# Patient Record
Sex: FEMALE | Race: WHITE | NOT HISPANIC OR LATINO | Employment: UNEMPLOYED | URBAN - NONMETROPOLITAN AREA
[De-identification: names, ages, dates, MRNs, and addresses within clinical notes are randomized per-mention and may not be internally consistent; named-entity substitution may affect disease eponyms.]

---

## 2017-01-06 ENCOUNTER — HOSPITAL ENCOUNTER (OUTPATIENT)
Dept: GENERAL RADIOLOGY | Facility: HOSPITAL | Age: 47
Discharge: HOME | End: 2017-01-06
Attending: INTERNAL MEDICINE

## 2017-04-06 ENCOUNTER — OFFICE VISIT (OUTPATIENT)
Dept: CARDIOLOGY | Facility: CLINIC | Age: 47
End: 2017-04-06

## 2017-04-06 VITALS
HEIGHT: 70 IN | HEART RATE: 77 BPM | WEIGHT: 131 LBS | BODY MASS INDEX: 18.75 KG/M2 | DIASTOLIC BLOOD PRESSURE: 68 MMHG | RESPIRATION RATE: 16 BRPM | SYSTOLIC BLOOD PRESSURE: 101 MMHG

## 2017-04-06 DIAGNOSIS — R53.82 CHRONIC FATIGUE: ICD-10-CM

## 2017-04-06 DIAGNOSIS — A41.9 SEVERE SEPSIS WITH ACUTE ORGAN DYSFUNCTION (HCC): ICD-10-CM

## 2017-04-06 DIAGNOSIS — R07.2 PRECORDIAL PAIN: ICD-10-CM

## 2017-04-06 DIAGNOSIS — R65.20 SEVERE SEPSIS WITH ACUTE ORGAN DYSFUNCTION (HCC): ICD-10-CM

## 2017-04-06 DIAGNOSIS — Z72.0 TOBACCO ABUSE: ICD-10-CM

## 2017-04-06 DIAGNOSIS — R06.02 SOB (SHORTNESS OF BREATH): Primary | ICD-10-CM

## 2017-04-06 PROBLEM — R06.09 DYSPNEA ON EXERTION: Status: ACTIVE | Noted: 2017-04-06

## 2017-04-06 PROCEDURE — 99204 OFFICE O/P NEW MOD 45 MIN: CPT | Performed by: INTERNAL MEDICINE

## 2017-04-06 PROCEDURE — 93000 ELECTROCARDIOGRAM COMPLETE: CPT | Performed by: INTERNAL MEDICINE

## 2017-04-06 RX ORDER — GABAPENTIN 800 MG/1
300 TABLET ORAL 2 TIMES DAILY
Status: ON HOLD | COMMUNITY
End: 2017-06-20 | Stop reason: DRUGHIGH

## 2017-04-06 NOTE — PROGRESS NOTES
Sigifredo Jacques MD  Ernesto Stone  2017    Patient Active Problem List   Diagnosis   • Recent severe sepsis with acute organ dysfunction   • Dyspnea on exertion   • Tobacco abuse, smoke one pack a day since she was 15 years old.    • Precordial pain   • Chronic fatigue       Dear Sigifredo Jacques MD:    Subjective     Ernesto Stone is a 47 y.o. female with the problems as listed above, presents today for a cardiac work up.     History of Present Illness: Ms. Stone is a 47-year-old  female with history of known coronary artery disease,  recent history of sepsis due to pneumonia for which she was admitted with [facility] and apparently left AGAINST MEDICAL ADVICE.  She now presents with complaints of weakness and shortness of breath.  She has intermittent chest pains or 3 times a week which are mostly sharp pains.  She has dyspnea with mild to moderate exertion with no PND or orthopnea.     Cardiac risk factors:smoking    Allergies   Allergen Reactions   • Benadryl [Diphenhydramine]    :      Current Outpatient Prescriptions:   •  albuterol (PROVENTIL HFA;VENTOLIN HFA) 108 (90 BASE) MCG/ACT inhaler, Inhale 2 puffs every 4 (four) hours as needed for wheezing., Disp: , Rfl:   •  albuterol (PROVENTIL) (2.5 MG/3ML) 0.083% nebulizer solution, Take 2.5 mg by nebulization Daily., Disp: , Rfl:   •  gabapentin (NEURONTIN) 800 MG tablet, Take 800 mg by mouth 2 (Two) Times a Day., Disp: , Rfl:   •  levothyroxine (SYNTHROID, LEVOTHROID) 100 MCG tablet, Take 100 mcg by mouth daily., Disp: , Rfl:   •  aspirin 81 MG tablet, Take 1 tablet by mouth Daily., Disp: 30 tablet, Rfl: 3    Past Medical History:   Diagnosis Date   • Alcoholic    • Asthma    • Disease of thyroid gland    • Ulcer of abdomen wall      Past Surgical History:   Procedure Laterality Date   •  SECTION     • FRACTURE SURGERY      x2   • KNEE CARTILAGE SURGERY Left      Family History   Problem Relation Age of Onset  "  • Cancer Mother    • Arthritis Mother    • Bradycardia Mother    • Other Mother      Tachycardia      Social History   Substance Use Topics   • Smoking status: Current Every Day Smoker     Packs/day: 1.00     Types: Cigarettes   • Smokeless tobacco: None   • Alcohol use Yes       Review of Systems   Constitution: Positive for decreased appetite, malaise/fatigue and weight loss.   HENT: Positive for congestion, headaches and sore throat. Negative for nosebleeds and tinnitus.    Eyes: Positive for double vision (on & off) and visual disturbance.   Cardiovascular: Positive for chest pain (with pressure) and dyspnea on exertion.   Respiratory: Positive for cough (Occ. pt states its from smoking ), shortness of breath and wheezing.    Endocrine: Positive for heat intolerance. Negative for cold intolerance.   Hematologic/Lymphatic: Does not bruise/bleed easily.   Skin: Negative for rash.   Musculoskeletal: Positive for arthritis (RA per pt), joint pain and muscle weakness.   Gastrointestinal: Positive for abdominal pain, change in bowel habit, constipation, diarrhea, nausea and vomiting. Negative for heartburn.   Genitourinary: Positive for bladder incontinence and hesitancy.   Neurological: Positive for excessive daytime sleepiness, dizziness, light-headedness, numbness (bilat hands) and tremors.   Psychiatric/Behavioral: Positive for depression, memory loss and substance abuse (states she is a recovering alcoholic). The patient has insomnia and is nervous/anxious.        Objective   Blood pressure 101/68, pulse 77, resp. rate 16, height 70\" (177.8 cm), weight 131 lb (59.4 kg).  Body mass index is 18.8 kg/(m^2).        Physical Exam   Constitutional: She is oriented to person, place, and time. She appears well-developed and well-nourished.   HENT:   Mouth/Throat: Oropharynx is clear and moist.   Eyes: EOM are normal. Pupils are equal, round, and reactive to light.   Neck: Neck supple. No JVD present. No tracheal " deviation present. No thyromegaly present.   Cardiovascular: Normal rate, regular rhythm, S1 normal and S2 normal.  Exam reveals no gallop and no friction rub.    No murmur heard.  Pulmonary/Chest: Effort normal and breath sounds normal.   Abdominal: Soft. Bowel sounds are normal. She exhibits no mass. There is no tenderness.   Musculoskeletal: Normal range of motion. She exhibits no edema.   Lymphadenopathy:     She has no cervical adenopathy.   Neurological: She is alert and oriented to person, place, and time.   Skin: Skin is warm and dry. No rash noted.       Lab Results   Component Value Date     09/29/2016    K 4.0 09/29/2016     09/29/2016    CO2 24.2 (L) 09/29/2016    BUN 22 (H) 09/29/2016    CREATININE 0.80 09/29/2016    GLUCOSE 197 (H) 09/29/2016    CALCIUM 8.4 09/29/2016    AST 28 09/29/2016    ALT 28 09/29/2016    ALKPHOS 41 (L) 09/29/2016    LABIL2 1.1 (L) 09/29/2016     Lab Results   Component Value Date    CKTOTAL 67 09/29/2016     Lab Results   Component Value Date    WBC 10.37 09/29/2016    HGB 11.9 (L) 09/29/2016    HCT 35.9 (L) 09/29/2016     09/29/2016     Lab Results   Component Value Date    INR 1.17 (H) 09/28/2016     Lab Results   Component Value Date    MG 1.6 (L) 09/28/2016     Lab Results   Component Value Date    TSH 0.466 (L) 09/27/2016      No results found for: BNP      ECG 12 Lead  Date/Time: 4/6/2017 1:57 PM  Performed by: JED OAKLEY  Authorized by: JED OAKLEY               Assessment/Plan :  Ernesto was seen today for establish care and chest pain.  Diagnoses and all orders for this visit:    1.  Precordial pain  2.  SOB (shortness of breath)  3.  Tobacco abuse, smoke one pack a day since she was 15 years old.   4.  Chronic fatigue  5.  Recent severe sepsis due to pneumonia.  6.  Medical noncompliance.       Recommendations:    1. Advised pt to stop smoking.   2. Evaluate further with Lexiscan stress test, Echo doppler study, and holter monitor.    3. Advised pt to keep herself hydrated with water and fluids such as Gatorade.  4. Add ASA 81 mg QD.   5. Follow up in 3 weeks.      Return in about 3 weeks (around 4/27/2017) for Recheck, or sooner if needed.    As always, I appreciate very much the opportunity to participate in the cardiovascular care of your patients.      With Best Regards,    Don Clifford MD, FACC

## 2017-04-28 ENCOUNTER — HOSPITAL ENCOUNTER (OUTPATIENT)
Dept: NUCLEAR MEDICINE | Facility: HOSPITAL | Age: 47
Discharge: HOME | End: 2017-04-28
Attending: INTERNAL MEDICINE

## 2017-04-28 ENCOUNTER — HOSPITAL ENCOUNTER (OUTPATIENT)
Dept: RESPIRATORY THERAPY | Facility: HOSPITAL | Age: 47
Discharge: HOME | End: 2017-04-28
Attending: INTERNAL MEDICINE

## 2017-04-28 ENCOUNTER — HOSPITAL ENCOUNTER (OUTPATIENT)
Dept: CARDIOLOGY | Facility: HOSPITAL | Age: 47
Discharge: HOME | End: 2017-04-28
Attending: INTERNAL MEDICINE

## 2017-04-28 DIAGNOSIS — R07.2 PRECORDIAL PAIN: ICD-10-CM

## 2017-04-28 DIAGNOSIS — R53.82 CHRONIC FATIGUE: ICD-10-CM

## 2017-04-28 PROCEDURE — A9500 TC99M SESTAMIBI: HCPCS | Performed by: INTERNAL MEDICINE

## 2017-04-28 PROCEDURE — 78451 HT MUSCLE IMAGE SPECT SING: CPT

## 2017-04-28 PROCEDURE — 78452 HT MUSCLE IMAGE SPECT MULT: CPT | Performed by: INTERNAL MEDICINE

## 2017-04-28 PROCEDURE — 93306 TTE W/DOPPLER COMPLETE: CPT | Performed by: INTERNAL MEDICINE

## 2017-04-28 PROCEDURE — 93306 TTE W/DOPPLER COMPLETE: CPT

## 2017-04-28 PROCEDURE — 93017 CV STRESS TEST TRACING ONLY: CPT

## 2017-04-28 PROCEDURE — 0 TECHNETIUM SESTAMIBI: Performed by: INTERNAL MEDICINE

## 2017-04-28 PROCEDURE — 93018 CV STRESS TEST I&R ONLY: CPT | Performed by: INTERNAL MEDICINE

## 2017-04-28 PROCEDURE — 25010000002 REGADENOSON 0.4 MG/5ML SOLUTION: Performed by: INTERNAL MEDICINE

## 2017-04-28 RX ADMIN — Medication 1 DOSE: at 08:05

## 2017-04-28 RX ADMIN — Medication 1 DOSE: at 10:52

## 2017-04-28 RX ADMIN — REGADENOSON 0.4 MG: 0.08 INJECTION, SOLUTION INTRAVENOUS at 10:55

## 2017-05-01 ENCOUNTER — OFFICE VISIT (OUTPATIENT)
Dept: CARDIOLOGY | Facility: CLINIC | Age: 47
End: 2017-05-01

## 2017-05-01 VITALS
HEART RATE: 57 BPM | BODY MASS INDEX: 18.64 KG/M2 | DIASTOLIC BLOOD PRESSURE: 68 MMHG | HEIGHT: 70 IN | SYSTOLIC BLOOD PRESSURE: 120 MMHG | WEIGHT: 130.2 LBS

## 2017-05-01 DIAGNOSIS — R53.82 CHRONIC FATIGUE: Primary | ICD-10-CM

## 2017-05-01 PROCEDURE — 99213 OFFICE O/P EST LOW 20 MIN: CPT | Performed by: INTERNAL MEDICINE

## 2017-05-02 ENCOUNTER — LAB (OUTPATIENT)
Dept: LAB | Facility: HOSPITAL | Age: 47
End: 2017-05-02
Attending: INTERNAL MEDICINE

## 2017-05-02 ENCOUNTER — HOSPITAL ENCOUNTER (OUTPATIENT)
Dept: RESPIRATORY THERAPY | Facility: HOSPITAL | Age: 47
Discharge: HOME | End: 2017-05-02
Attending: INTERNAL MEDICINE | Admitting: INTERNAL MEDICINE

## 2017-05-02 ENCOUNTER — TRANSCRIBE ORDERS (OUTPATIENT)
Dept: ADMINISTRATIVE | Facility: HOSPITAL | Age: 47
End: 2017-05-02

## 2017-05-02 DIAGNOSIS — R53.82 CHRONIC FATIGUE: ICD-10-CM

## 2017-05-02 DIAGNOSIS — R53.82 CHRONIC FATIGUE: Primary | ICD-10-CM

## 2017-05-02 LAB
ANION GAP SERPL CALCULATED.3IONS-SCNC: 2.4 MMOL/L (ref 3.6–11.2)
BH CV NUCLEAR PRIOR STUDY: 3
BH CV STRESS BP STAGE 1: NORMAL
BH CV STRESS BP STAGE 2: NORMAL
BH CV STRESS COMMENTS STAGE 1: NORMAL
BH CV STRESS COMMENTS STAGE 2: NORMAL
BH CV STRESS DOSE REGADENOSON STAGE 1: 0.4
BH CV STRESS DURATION MIN STAGE 1: 0
BH CV STRESS DURATION MIN STAGE 2: 4
BH CV STRESS DURATION SEC STAGE 1: 15
BH CV STRESS DURATION SEC STAGE 2: 0
BH CV STRESS HR STAGE 1: 48
BH CV STRESS HR STAGE 2: 53
BH CV STRESS PROTOCOL 1: NORMAL
BH CV STRESS RECOVERY BP: NORMAL MMHG
BH CV STRESS RECOVERY HR: 49 BPM
BH CV STRESS STAGE 1: 1
BH CV STRESS STAGE 2: 2
BUN BLD-MCNC: 8 MG/DL (ref 7–21)
BUN/CREAT SERPL: 8.9 (ref 7–25)
CALCIUM SPEC-SCNC: 9.5 MG/DL (ref 7.7–10)
CHLORIDE SERPL-SCNC: 109 MMOL/L (ref 99–112)
CO2 SERPL-SCNC: 32.6 MMOL/L (ref 24.3–31.9)
CREAT BLD-MCNC: 0.9 MG/DL (ref 0.43–1.29)
GFR SERPL CREATININE-BSD FRML MDRD: 67 ML/MIN/1.73
GLUCOSE BLD-MCNC: 100 MG/DL (ref 70–110)
LV EF NUC BP: 68 %
MAGNESIUM SERPL-MCNC: 2.1 MG/DL (ref 1.7–2.6)
MAXIMAL PREDICTED HEART RATE: 174 BPM
OSMOLALITY SERPL CALC.SUM OF ELEC: 285.3 MOSM/KG (ref 273–305)
PERCENT MAX PREDICTED HR: 44.25 %
POTASSIUM BLD-SCNC: 4.4 MMOL/L (ref 3.5–5.3)
SODIUM BLD-SCNC: 144 MMOL/L (ref 135–153)
STRESS BASELINE BP: NORMAL MMHG
STRESS BASELINE HR: 39 BPM
STRESS PERCENT HR: 52 %
STRESS POST PEAK BP: NORMAL MMHG
STRESS POST PEAK HR: 77 BPM
STRESS TARGET HR: 148 BPM
TSH SERPL DL<=0.05 MIU/L-ACNC: 2.11 MIU/ML (ref 0.55–4.78)

## 2017-05-02 PROCEDURE — 83735 ASSAY OF MAGNESIUM: CPT | Performed by: INTERNAL MEDICINE

## 2017-05-02 PROCEDURE — 36415 COLL VENOUS BLD VENIPUNCTURE: CPT

## 2017-05-02 PROCEDURE — 84443 ASSAY THYROID STIM HORMONE: CPT | Performed by: INTERNAL MEDICINE

## 2017-05-02 PROCEDURE — 93225 XTRNL ECG REC<48 HRS REC: CPT

## 2017-05-02 PROCEDURE — 80048 BASIC METABOLIC PNL TOTAL CA: CPT | Performed by: INTERNAL MEDICINE

## 2017-05-02 PROCEDURE — 93226 XTRNL ECG REC<48 HR SCAN A/R: CPT

## 2017-05-04 LAB
BH CV ECHO MEAS - % IVS THICK: 75 %
BH CV ECHO MEAS - % LVPW THICK: 29.7 %
BH CV ECHO MEAS - ACS: 2.2 CM
BH CV ECHO MEAS - AO ROOT AREA (BSA CORRECTED): 1.8
BH CV ECHO MEAS - AO ROOT AREA: 7.4 CM^2
BH CV ECHO MEAS - AO ROOT DIAM: 3.1 CM
BH CV ECHO MEAS - BSA(HAYCOCK): 1.7 M^2
BH CV ECHO MEAS - BSA: 1.7 M^2
BH CV ECHO MEAS - BZI_BMI: 18.8 KILOGRAMS/M^2
BH CV ECHO MEAS - BZI_METRIC_HEIGHT: 177.8 CM
BH CV ECHO MEAS - BZI_METRIC_WEIGHT: 59.4 KG
BH CV ECHO MEAS - CONTRAST EF 4CH: 64.9 ML/M^2
BH CV ECHO MEAS - EDV(CUBED): 119.7 ML
BH CV ECHO MEAS - EDV(MOD-SP4): 97 ML
BH CV ECHO MEAS - EDV(TEICH): 114.4 ML
BH CV ECHO MEAS - EF(CUBED): 71.1 %
BH CV ECHO MEAS - EF(MOD-SP4): 64.9 %
BH CV ECHO MEAS - EF(TEICH): 62.6 %
BH CV ECHO MEAS - ESV(CUBED): 34.6 ML
BH CV ECHO MEAS - ESV(MOD-SP4): 34 ML
BH CV ECHO MEAS - ESV(TEICH): 42.8 ML
BH CV ECHO MEAS - FS: 33.9 %
BH CV ECHO MEAS - IVS/LVPW: 0.83
BH CV ECHO MEAS - IVSD: 0.8 CM
BH CV ECHO MEAS - IVSS: 1.4 CM
BH CV ECHO MEAS - LA DIMENSION: 3.1 CM
BH CV ECHO MEAS - LA/AO: 1
BH CV ECHO MEAS - LV DIASTOLIC VOL/BSA (35-75): 55.6 ML/M^2
BH CV ECHO MEAS - LV MASS(C)D: 149.6 GRAMS
BH CV ECHO MEAS - LV MASS(C)DI: 85.8 GRAMS/M^2
BH CV ECHO MEAS - LV MASS(C)S: 142.6 GRAMS
BH CV ECHO MEAS - LV MASS(C)SI: 81.8 GRAMS/M^2
BH CV ECHO MEAS - LV SYSTOLIC VOL/BSA (12-30): 19.5 ML/M^2
BH CV ECHO MEAS - LVIDD: 4.9 CM
BH CV ECHO MEAS - LVIDS: 3.3 CM
BH CV ECHO MEAS - LVLD AP4: 8.8 CM
BH CV ECHO MEAS - LVLS AP4: 7.5 CM
BH CV ECHO MEAS - LVOT AREA (M): 2.3 CM^2
BH CV ECHO MEAS - LVOT AREA: 2.4 CM^2
BH CV ECHO MEAS - LVOT DIAM: 1.7 CM
BH CV ECHO MEAS - LVPWD: 0.96 CM
BH CV ECHO MEAS - LVPWS: 1.2 CM
BH CV ECHO MEAS - MV A MAX VEL: 37.5 CM/SEC
BH CV ECHO MEAS - MV E MAX VEL: 103.2 CM/SEC
BH CV ECHO MEAS - MV E/A: 2.8
BH CV ECHO MEAS - PA ACC SLOPE: 659.4 CM/SEC^2
BH CV ECHO MEAS - PA ACC TIME: 0.13 SEC
BH CV ECHO MEAS - PA PR(ACCEL): 18.8 MMHG
BH CV ECHO MEAS - RAP SYSTOLE: 10 MMHG
BH CV ECHO MEAS - RVDD: 2.6 CM
BH CV ECHO MEAS - RVSP: 36.5 MMHG
BH CV ECHO MEAS - SI(CUBED): 48.8 ML/M^2
BH CV ECHO MEAS - SI(MOD-SP4): 36.1 ML/M^2
BH CV ECHO MEAS - SI(TEICH): 41 ML/M^2
BH CV ECHO MEAS - SV(CUBED): 85.1 ML
BH CV ECHO MEAS - SV(MOD-SP4): 63 ML
BH CV ECHO MEAS - SV(TEICH): 71.5 ML
BH CV ECHO MEAS - TR MAX VEL: 257.4 CM/SEC

## 2017-05-10 PROCEDURE — 93227 XTRNL ECG REC<48 HR R&I: CPT | Performed by: INTERNAL MEDICINE

## 2017-05-22 ENCOUNTER — OFFICE VISIT (OUTPATIENT)
Dept: CARDIOLOGY | Facility: CLINIC | Age: 47
End: 2017-05-22

## 2017-05-22 VITALS
RESPIRATION RATE: 16 BRPM | BODY MASS INDEX: 18.66 KG/M2 | HEIGHT: 69 IN | DIASTOLIC BLOOD PRESSURE: 66 MMHG | WEIGHT: 126 LBS | HEART RATE: 76 BPM | SYSTOLIC BLOOD PRESSURE: 100 MMHG

## 2017-05-22 DIAGNOSIS — R07.2 PRECORDIAL PAIN: ICD-10-CM

## 2017-05-22 DIAGNOSIS — R53.83 OTHER FATIGUE: Primary | ICD-10-CM

## 2017-05-22 DIAGNOSIS — Z72.0 TOBACCO ABUSE: ICD-10-CM

## 2017-05-22 DIAGNOSIS — Z82.49 FAMILY HISTORY OF PREMATURE CORONARY ARTERY DISEASE: ICD-10-CM

## 2017-05-22 DIAGNOSIS — I95.9 HYPOTENSION, UNSPECIFIED HYPOTENSION TYPE: ICD-10-CM

## 2017-05-22 PROCEDURE — 99214 OFFICE O/P EST MOD 30 MIN: CPT | Performed by: PHYSICIAN ASSISTANT

## 2017-05-23 ENCOUNTER — LAB (OUTPATIENT)
Dept: LAB | Facility: HOSPITAL | Age: 47
End: 2017-05-23
Attending: INTERNAL MEDICINE

## 2017-05-23 DIAGNOSIS — R53.83 OTHER FATIGUE: ICD-10-CM

## 2017-05-23 DIAGNOSIS — R07.2 PRECORDIAL PAIN: ICD-10-CM

## 2017-05-23 DIAGNOSIS — I95.9 HYPOTENSION, UNSPECIFIED HYPOTENSION TYPE: ICD-10-CM

## 2017-05-23 LAB
ALBUMIN SERPL-MCNC: 4 G/DL (ref 3.5–5)
ALP SERPL-CCNC: 87 U/L (ref 35–104)
ALT SERPL W P-5'-P-CCNC: 30 U/L (ref 10–36)
AST SERPL-CCNC: 29 U/L (ref 10–30)
BILIRUB CONJ SERPL-MCNC: 0.1 MG/DL (ref 0–0.2)
BILIRUB INDIRECT SERPL-MCNC: 0.2 MG/DL
BILIRUB SERPL-MCNC: 0.3 MG/DL (ref 0.2–1.8)
CHOLEST SERPL-MCNC: 176 MG/DL (ref 0–200)
CORTIS AM PEAK SERPL-MCNC: 11.3 MCG/DL (ref 4.3–22.4)
HDLC SERPL-MCNC: 75 MG/DL (ref 60–100)
LDLC SERPL CALC-MCNC: 87 MG/DL (ref 0–100)
LDLC/HDLC SERPL: 1.16 {RATIO}
PROT SERPL-MCNC: 7.6 G/DL (ref 6–8)
TRIGL SERPL-MCNC: 71 MG/DL (ref 0–150)
VLDLC SERPL-MCNC: 14.2 MG/DL

## 2017-05-23 PROCEDURE — 82533 TOTAL CORTISOL: CPT | Performed by: PHYSICIAN ASSISTANT

## 2017-05-23 PROCEDURE — 36415 COLL VENOUS BLD VENIPUNCTURE: CPT

## 2017-05-23 PROCEDURE — 80076 HEPATIC FUNCTION PANEL: CPT | Performed by: PHYSICIAN ASSISTANT

## 2017-05-23 PROCEDURE — 80061 LIPID PANEL: CPT | Performed by: PHYSICIAN ASSISTANT

## 2017-06-01 DIAGNOSIS — R42 DIZZINESS: Primary | ICD-10-CM

## 2017-06-01 DIAGNOSIS — R94.39 ABNORMAL STRESS TEST: ICD-10-CM

## 2017-06-01 DIAGNOSIS — R07.2 PRECORDIAL PAIN: Primary | ICD-10-CM

## 2017-06-01 DIAGNOSIS — R00.1 BRADYCARDIA: ICD-10-CM

## 2017-06-09 ENCOUNTER — HOSPITAL ENCOUNTER (OUTPATIENT)
Dept: CT IMAGING | Facility: HOSPITAL | Age: 47
Discharge: HOME | End: 2017-06-09

## 2017-06-09 ENCOUNTER — DOCUMENTATION (OUTPATIENT)
Dept: CARDIOLOGY | Facility: CLINIC | Age: 47
End: 2017-06-09

## 2017-06-09 VITALS
OXYGEN SATURATION: 96 % | DIASTOLIC BLOOD PRESSURE: 71 MMHG | SYSTOLIC BLOOD PRESSURE: 117 MMHG | HEIGHT: 70 IN | BODY MASS INDEX: 18.04 KG/M2 | WEIGHT: 126 LBS | HEART RATE: 43 BPM

## 2017-06-09 DIAGNOSIS — R07.2 PRECORDIAL PAIN: ICD-10-CM

## 2017-06-09 RX ORDER — METOPROLOL TARTRATE 100 MG/1
100 TABLET ORAL ONCE AS NEEDED
Status: DISCONTINUED | OUTPATIENT
Start: 2017-06-09 | End: 2017-06-10 | Stop reason: HOSPADM

## 2017-06-09 RX ORDER — METOPROLOL TARTRATE 50 MG/1
50 TABLET, FILM COATED ORAL ONCE AS NEEDED
Status: DISCONTINUED | OUTPATIENT
Start: 2017-06-09 | End: 2017-06-10 | Stop reason: HOSPADM

## 2017-06-09 RX ORDER — NITROGLYCERIN 0.4 MG/1
0.4 TABLET SUBLINGUAL
Status: DISCONTINUED | OUTPATIENT
Start: 2017-06-09 | End: 2017-06-10 | Stop reason: HOSPADM

## 2017-06-09 RX ORDER — SODIUM CHLORIDE 0.9 % (FLUSH) 0.9 %
1-10 SYRINGE (ML) INJECTION AS NEEDED
Status: DISCONTINUED | OUTPATIENT
Start: 2017-06-09 | End: 2017-06-10 | Stop reason: HOSPADM

## 2017-06-09 RX ORDER — LIDOCAINE HYDROCHLORIDE 10 MG/ML
5 INJECTION, SOLUTION INFILTRATION; PERINEURAL AS NEEDED
Status: DISCONTINUED | OUTPATIENT
Start: 2017-06-09 | End: 2017-06-10 | Stop reason: HOSPADM

## 2017-06-09 NOTE — NURSING NOTE
Procedure was cancelled per radiologist due to heart rate too low and pt was stuck multiple times with no success. Pt did obtain and IV with Christina from infusion. IV site did not make it through patency testing with injector. Pt is to return to ordering office.

## 2017-06-12 NOTE — PROGRESS NOTES
Spoke with Ernesto and advised her what the hospital had told me about her CT that was going to be done this Friday morning. She stated that she had just spoke with someone from the hospital and stated that they told her that we hadn't put the order in correctly. I advised her that I had just got off the phone not long before she called and got it took care of and they was suppose to be calling her to ask a few more questions and was going to set it up for Friday. She expressed understanding.

## 2017-06-13 ENCOUNTER — OFFICE VISIT (OUTPATIENT)
Dept: CARDIOLOGY | Facility: CLINIC | Age: 47
End: 2017-06-13

## 2017-06-13 VITALS
BODY MASS INDEX: 18.64 KG/M2 | HEART RATE: 54 BPM | RESPIRATION RATE: 16 BRPM | SYSTOLIC BLOOD PRESSURE: 108 MMHG | WEIGHT: 130.2 LBS | HEIGHT: 70 IN | DIASTOLIC BLOOD PRESSURE: 62 MMHG

## 2017-06-13 DIAGNOSIS — R00.1 BRADYCARDIA: Primary | ICD-10-CM

## 2017-06-13 PROBLEM — B19.20 HCV (HEPATITIS C VIRUS): Status: ACTIVE | Noted: 2017-06-13

## 2017-06-13 PROCEDURE — 99212 OFFICE O/P EST SF 10 MIN: CPT | Performed by: INTERNAL MEDICINE

## 2017-06-13 NOTE — PROGRESS NOTES
"The patient was seen in clinic today and she appeared to be very agitated.  She stated \"she was told she could die and needed to see a cardiologist right now\".  I reviewed her record which shows she had a normal echocardiogram done as well as a normal stress test done.  She also had an event monitor placed which showed mostly sinus rhythm from the 60s to the 80s with occasional episodes of sinus bradycardia into the 50s.  He was noted to have no significant arrhythmias.  The patient was adamant that she needed to have a cardiac catheterization done as well and has a pacemaker placed.  She kept insisting she had gone to have a CT angiogram done and was told at that time that she had a heart rate into the 30s.  I could find no record of this.  She did have a previous Holter which showed bradycardia into the 40s and average heart rate into 70s and a maximum heart rate into the 130s. I tried to explain to her that she had a stress test done which showed adequate chronotropic response and at this point there was no indication for either a cardiac catheterization or a pacemaker placement.  At that point the patient became very agitated and decided to walk out of the clinic stating she was going to [place].  She stated she wanted all her records given and refuses to allow me to examine her.  She kept complaining that \"we did not understand how sick she was \".  At this point she is to call the clinic if she decides further follow-up.  "

## 2017-06-13 NOTE — PROGRESS NOTES
"Sigifredo Jacques MD  Ernesto MENDEZ French  1970 06/13/2017    Patient Active Problem List   Diagnosis   • Recent severe sepsis with acute organ dysfunction   • Dyspnea on exertion   • Tobacco abuse, smoke one pack a day since she was 15 years old.    • Precordial pain   • Chronic fatigue       Dear Sigifredo Jacques MD:    Subjective     Ernesto Stone is a 47 y.o. female with the above medical problems who {Specialty - why patient here?:[id number]}       Current Outpatient Prescriptions:   •  albuterol (PROVENTIL) (2.5 MG/3ML) 0.083% nebulizer solution, Take 2.5 mg by nebulization As Needed., Disp: , Rfl:   •  aspirin 81 MG tablet, Take 1 tablet by mouth Daily., Disp: 30 tablet, Rfl: 3  •  gabapentin (NEURONTIN) 800 MG tablet, Take 800 mg by mouth 2 (Two) Times a Day., Disp: , Rfl:   •  levothyroxine (SYNTHROID, LEVOTHROID) 100 MCG tablet, Take 100 mcg by mouth daily., Disp: , Rfl:   •  albuterol (PROVENTIL HFA;VENTOLIN HFA) 108 (90 BASE) MCG/ACT inhaler, Inhale 2 puffs every 4 (four) hours as needed for wheezing., Disp: , Rfl:     {Common H&P Review Areas:10099}    Review of Systems   Constitution: Negative for chills and fever.   HENT: Negative for headaches, nosebleeds and sore throat.    Cardiovascular: Positive for chest pain, dyspnea on exertion and palpitations.   Respiratory: Negative for cough, hemoptysis and wheezing.    Gastrointestinal: Negative for abdominal pain, hematemesis, hematochezia, melena, nausea and vomiting.   Genitourinary: Negative for dysuria and hematuria.   Neurological: Positive for dizziness.       Objective   Blood pressure 108/62, pulse 54, resp. rate 16, height 70\" (177.8 cm), weight 130 lb 3.2 oz (59.1 kg).    Physical Exam    Procedures    Assessment/Plan     No diagnosis found.         No Follow-up on file.    I appreciate the opportunity to participate in this patient's cardiovascular care.    Best Regards    Jarocho Graves      "

## 2017-06-15 ENCOUNTER — OFFICE VISIT (OUTPATIENT)
Dept: CARDIOLOGY | Facility: CLINIC | Age: 47
End: 2017-06-15

## 2017-06-15 VITALS
SYSTOLIC BLOOD PRESSURE: 119 MMHG | WEIGHT: 131.6 LBS | BODY MASS INDEX: 18.84 KG/M2 | HEIGHT: 70 IN | HEART RATE: 60 BPM | DIASTOLIC BLOOD PRESSURE: 75 MMHG

## 2017-06-15 DIAGNOSIS — R07.2 PRECORDIAL PAIN: Primary | ICD-10-CM

## 2017-06-15 DIAGNOSIS — R53.82 CHRONIC FATIGUE: ICD-10-CM

## 2017-06-15 DIAGNOSIS — Z72.0 TOBACCO ABUSE: ICD-10-CM

## 2017-06-15 DIAGNOSIS — R06.09 DYSPNEA ON EXERTION: ICD-10-CM

## 2017-06-15 PROCEDURE — 99214 OFFICE O/P EST MOD 30 MIN: CPT | Performed by: INTERNAL MEDICINE

## 2017-06-15 RX ORDER — ISOSORBIDE MONONITRATE 30 MG/1
30 TABLET, EXTENDED RELEASE ORAL DAILY
Qty: 30 TABLET | Refills: 2 | Status: ON HOLD | OUTPATIENT
Start: 2017-06-15 | End: 2017-06-20

## 2017-06-15 RX ORDER — DIAZEPAM 5 MG/1
5 TABLET ORAL ONCE
Status: CANCELLED | OUTPATIENT
Start: 2017-06-20

## 2017-06-15 NOTE — PROGRESS NOTES
Sigifredo Jacques MD  Ernesto Stone  1970  06/15/2017    Patient Active Problem List   Diagnosis   • Recent severe sepsis with acute organ dysfunction   • Dyspnea on exertion   • Tobacco abuse, smoke one pack a day since she was 15 years old.    • Precordial pain   • Chronic fatigue   • Bradycardia   • HCV (hepatitis C virus)   • Precordial pain, suspicious for CCS class III angina.       Dear Sigifredo Jacques MD:    Subjective     Ernesto Stone is a 47 y.o. female with the problems as listed above, presents for follow-up of chest pains and shortness of breath.      History of Present Illness:Ms. Stone is a 47-year-old  female with complaints of frequent chest pains and feeling tired all the time.  She also gets short of breath with mild exertion.  She has history of smoking since she was 15 years old.  One of her aunts had heart problems with bypass surgery in her 40s.Patient is very concerned that she may have significant coronary artery disease and wants to be evaluated further to rule out any significant coronary artery disease.      Allergies   Allergen Reactions   • Benadryl [Diphenhydramine]    :      Current Outpatient Prescriptions:   •  albuterol (PROVENTIL HFA;VENTOLIN HFA) 108 (90 BASE) MCG/ACT inhaler, Inhale 2 puffs every 4 (four) hours as needed for wheezing., Disp: , Rfl:   •  albuterol (PROVENTIL) (2.5 MG/3ML) 0.083% nebulizer solution, Take 2.5 mg by nebulization As Needed., Disp: , Rfl:   •  aspirin 81 MG tablet, Take 1 tablet by mouth Daily., Disp: 30 tablet, Rfl: 3  •  gabapentin (NEURONTIN) 800 MG tablet, Take 800 mg by mouth 2 (Two) Times a Day. Taking 1 tab nightly, Disp: , Rfl:   •  levothyroxine (SYNTHROID, LEVOTHROID) 100 MCG tablet, Take 100 mcg by mouth daily., Disp: , Rfl:   •  isosorbide mononitrate (IMDUR) 30 MG 24 hr tablet, Take 1 tablet by mouth Daily., Disp: 30 tablet, Rfl: 2      The following portions of the patient's history were reviewed and  "updated as appropriate: allergies, current medications, past family history, past medical history, past social history, past surgical history and problem list.    Social History   Substance Use Topics   • Smoking status: Current Every Day Smoker     Packs/day: 1.00     Types: Cigarettes   • Smokeless tobacco: Never Used   • Alcohol use Yes       Review of Systems   Constitution: Positive for weakness.   Cardiovascular: Positive for palpitations. Negative for syncope. Chest pain: pressure w/SOA.   Respiratory: Positive for shortness of breath.    Neurological: Positive for dizziness, light-headedness and numbness (both arms at night time).       Objective   Vitals:    06/15/17 1309   BP: 119/75   BP Location: Left arm   Pulse: 60   Weight: 131 lb 9.6 oz (59.7 kg)   Height: 70\" (177.8 cm)     Body mass index is 18.88 kg/(m^2).        Physical Exam   Constitutional: She is oriented to person, place, and time. She appears well-developed and well-nourished.   HENT:   Head: Normocephalic.   Eyes: Conjunctivae and EOM are normal.   Neck: Normal range of motion. Neck supple. No JVD present. No tracheal deviation present. No thyromegaly present.   Cardiovascular: Normal rate and regular rhythm.  Exam reveals no gallop and no friction rub.    No murmur heard.  Pulmonary/Chest: Breath sounds normal. No respiratory distress. She has no wheezes. She has no rales.   Abdominal: Soft. Bowel sounds are normal. She exhibits no mass. There is no tenderness.   Musculoskeletal: She exhibits no edema.   Neurological: She is alert and oriented to person, place, and time. No cranial nerve deficit.   Skin: Skin is warm and dry.   Psychiatric: She has a normal mood and affect.       Lab Results   Component Value Date     05/02/2017    K 4.4 05/02/2017     05/02/2017    CO2 32.6 (H) 05/02/2017    BUN 8 05/02/2017    CREATININE 0.90 05/02/2017    GLUCOSE 100 05/02/2017    CALCIUM 9.5 05/02/2017    AST 29 05/23/2017    ALT 30 " 05/23/2017    ALKPHOS 87 05/23/2017    LABIL2 1.1 (L) 09/29/2016     Lab Results   Component Value Date    CKTOTAL 67 09/29/2016     Lab Results   Component Value Date    WBC 10.37 09/29/2016    HGB 11.9 (L) 09/29/2016    HCT 35.9 (L) 09/29/2016     09/29/2016     Lab Results   Component Value Date    INR 1.17 (H) 09/28/2016     Lab Results   Component Value Date    MG 2.1 05/02/2017     Lab Results   Component Value Date    TSH 2.109 05/02/2017    TRIG 71 05/23/2017    HDL 75 05/23/2017      Procedures    Assessment/Plan    Diagnosis Plan   1. Precordial pain, suspicious for CCS class III angina.     2. Chronic fatigue     3. Tobacco abuse, smoke one pack a day since she was 15 years old.      4. Dyspnea on exertion(NYHA class III)            Recommendations:    1. Patient was initially set up for CT coronary angiography but this could not be done as patient apparently was bradycardic, the record of which I could not see on the Epic.  2. Since she has recurrent class III anginal symptoms and risk factors for coronary artery disease, I have discussed with her about the option of further cardiac evaluation with cardiac catheterization.  I have discussed the procedure, potential risks which include but are not limited to, stroke, heart attack, damage to the circulation, kidney damage, dye allergy and death etc.  Patient expressed understanding of the same and is wanting to proceed.  We'll try to schedule this for next Tuesday.  3. I have reviewed the event monitor recordings that are available to me and they have revealed sinus rhythm with heart rate in the 50s and 60s and 70s with occasional heart rate in the 40s in the early morning hours.  There was no evidence of any significant AV block or long pauses.  I have discussed these findings with the patient.  I told her that there is no absolute indication for a permanent pacemaker at this time.  4. Will continue with low-dose aspirin for now.  5. Imdur 30 mg  daily.  6. We'll order a VQ scan to rule out any evidence of pulmonary embolism and Pulmonary function tests to rule out any chronic lung disease causing her dyspnea.       Return in about 3 months (around 9/15/2017).    As always, I appreciate very much the opportunity to participate in the cardiovascular care of your patients.      With Best Regards,    Don Clifford MD, FACC

## 2017-06-19 ENCOUNTER — LAB (OUTPATIENT)
Dept: LAB | Facility: HOSPITAL | Age: 47
End: 2017-06-19
Attending: INTERNAL MEDICINE

## 2017-06-19 ENCOUNTER — HOSPITAL ENCOUNTER (OUTPATIENT)
Dept: RESPIRATORY THERAPY | Facility: HOSPITAL | Age: 47
Discharge: HOME | End: 2017-06-19
Attending: INTERNAL MEDICINE | Admitting: INTERNAL MEDICINE

## 2017-06-19 DIAGNOSIS — R07.2 PRECORDIAL PAIN: ICD-10-CM

## 2017-06-19 LAB
ALBUMIN SERPL-MCNC: 3.7 G/DL (ref 3.5–5)
ALBUMIN/GLOB SERPL: 1.1 G/DL (ref 1.5–2.5)
ALP SERPL-CCNC: 82 U/L (ref 35–104)
ALT SERPL W P-5'-P-CCNC: 34 U/L (ref 10–36)
ANION GAP SERPL CALCULATED.3IONS-SCNC: 0.2 MMOL/L (ref 3.6–11.2)
AST SERPL-CCNC: 31 U/L (ref 10–30)
BASOPHILS # BLD AUTO: 0.03 10*3/MM3 (ref 0–0.3)
BASOPHILS NFR BLD AUTO: 0.5 % (ref 0–2)
BILIRUB SERPL-MCNC: 0.5 MG/DL (ref 0.2–1.8)
BUN BLD-MCNC: 8 MG/DL (ref 7–21)
BUN/CREAT SERPL: 10.5 (ref 7–25)
CALCIUM SPEC-SCNC: 9 MG/DL (ref 7.7–10)
CHLORIDE SERPL-SCNC: 107 MMOL/L (ref 99–112)
CO2 SERPL-SCNC: 32.8 MMOL/L (ref 24.3–31.9)
CREAT BLD-MCNC: 0.76 MG/DL (ref 0.43–1.29)
DEPRECATED RDW RBC AUTO: 48.3 FL (ref 37–54)
EOSINOPHIL # BLD AUTO: 0.29 10*3/MM3 (ref 0–0.7)
EOSINOPHIL NFR BLD AUTO: 4.6 % (ref 0–5)
ERYTHROCYTE [DISTWIDTH] IN BLOOD BY AUTOMATED COUNT: 14.6 % (ref 11.5–14.5)
GFR SERPL CREATININE-BSD FRML MDRD: 82 ML/MIN/1.73
GLOBULIN UR ELPH-MCNC: 3.3 GM/DL
GLUCOSE BLD-MCNC: 111 MG/DL (ref 70–110)
HCT VFR BLD AUTO: 39.2 % (ref 37–47)
HGB BLD-MCNC: 12.5 G/DL (ref 12–16)
IMM GRANULOCYTES # BLD: 0.01 10*3/MM3 (ref 0–0.03)
IMM GRANULOCYTES NFR BLD: 0.2 % (ref 0–0.5)
LYMPHOCYTES # BLD AUTO: 1.51 10*3/MM3 (ref 1–3)
LYMPHOCYTES NFR BLD AUTO: 24.1 % (ref 21–51)
MCH RBC QN AUTO: 30.3 PG (ref 27–33)
MCHC RBC AUTO-ENTMCNC: 31.9 G/DL (ref 33–37)
MCV RBC AUTO: 94.9 FL (ref 80–94)
MONOCYTES # BLD AUTO: 0.57 10*3/MM3 (ref 0.1–0.9)
MONOCYTES NFR BLD AUTO: 9.1 % (ref 0–10)
NEUTROPHILS # BLD AUTO: 3.85 10*3/MM3 (ref 1.4–6.5)
NEUTROPHILS NFR BLD AUTO: 61.5 % (ref 30–70)
OSMOLALITY SERPL CALC.SUM OF ELEC: 278.4 MOSM/KG (ref 273–305)
PLATELET # BLD AUTO: 208 10*3/MM3 (ref 130–400)
PMV BLD AUTO: 9.7 FL (ref 6–10)
POTASSIUM BLD-SCNC: 4 MMOL/L (ref 3.5–5.3)
PROT SERPL-MCNC: 7 G/DL (ref 6–8)
RBC # BLD AUTO: 4.13 10*6/MM3 (ref 4.2–5.4)
SODIUM BLD-SCNC: 140 MMOL/L (ref 135–153)
WBC NRBC COR # BLD: 6.26 10*3/MM3 (ref 4.5–12.5)

## 2017-06-19 PROCEDURE — 80053 COMPREHEN METABOLIC PANEL: CPT | Performed by: INTERNAL MEDICINE

## 2017-06-19 PROCEDURE — 93005 ELECTROCARDIOGRAM TRACING: CPT | Performed by: INTERNAL MEDICINE

## 2017-06-19 PROCEDURE — 85025 COMPLETE CBC W/AUTO DIFF WBC: CPT | Performed by: INTERNAL MEDICINE

## 2017-06-19 PROCEDURE — 36415 COLL VENOUS BLD VENIPUNCTURE: CPT

## 2017-06-19 PROCEDURE — 93010 ELECTROCARDIOGRAM REPORT: CPT | Performed by: INTERNAL MEDICINE

## 2017-06-20 ENCOUNTER — HOSPITAL ENCOUNTER (OUTPATIENT)
Facility: HOSPITAL | Age: 47
Setting detail: HOSPITAL OUTPATIENT SURGERY
Discharge: HOME | End: 2017-06-20
Attending: INTERNAL MEDICINE | Admitting: INTERNAL MEDICINE

## 2017-06-20 VITALS
HEIGHT: 69 IN | TEMPERATURE: 98.6 F | WEIGHT: 131 LBS | RESPIRATION RATE: 16 BRPM | HEART RATE: 50 BPM | SYSTOLIC BLOOD PRESSURE: 110 MMHG | DIASTOLIC BLOOD PRESSURE: 77 MMHG | BODY MASS INDEX: 19.4 KG/M2 | OXYGEN SATURATION: 94 %

## 2017-06-20 DIAGNOSIS — R07.2 PRECORDIAL PAIN: ICD-10-CM

## 2017-06-20 LAB — B-HCG UR QL: NEGATIVE

## 2017-06-20 PROCEDURE — C1894 INTRO/SHEATH, NON-LASER: HCPCS | Performed by: INTERNAL MEDICINE

## 2017-06-20 PROCEDURE — 0 IOPAMIDOL PER 1 ML: Performed by: INTERNAL MEDICINE

## 2017-06-20 PROCEDURE — 93458 L HRT ARTERY/VENTRICLE ANGIO: CPT | Performed by: INTERNAL MEDICINE

## 2017-06-20 PROCEDURE — 25010000002 HEPARIN (PORCINE) PER 1000 UNITS: Performed by: INTERNAL MEDICINE

## 2017-06-20 PROCEDURE — C1769 GUIDE WIRE: HCPCS | Performed by: INTERNAL MEDICINE

## 2017-06-20 PROCEDURE — 81025 URINE PREGNANCY TEST: CPT | Performed by: INTERNAL MEDICINE

## 2017-06-20 RX ORDER — LIDOCAINE HYDROCHLORIDE 20 MG/ML
INJECTION, SOLUTION INFILTRATION; PERINEURAL AS NEEDED
Status: DISCONTINUED | OUTPATIENT
Start: 2017-06-20 | End: 2017-06-20 | Stop reason: HOSPADM

## 2017-06-20 RX ORDER — ACETAMINOPHEN 325 MG/1
500 TABLET ORAL ONCE
Status: COMPLETED | OUTPATIENT
Start: 2017-06-20 | End: 2017-06-20

## 2017-06-20 RX ORDER — SODIUM CHLORIDE 9 MG/ML
INJECTION, SOLUTION INTRAVENOUS CONTINUOUS PRN
Status: DISCONTINUED | OUTPATIENT
Start: 2017-06-20 | End: 2017-06-20 | Stop reason: HOSPADM

## 2017-06-20 RX ORDER — DIAZEPAM 5 MG/1
5 TABLET ORAL ONCE
Status: DISCONTINUED | OUTPATIENT
Start: 2017-06-20 | End: 2017-06-20 | Stop reason: HOSPADM

## 2017-06-20 RX ORDER — HEPARIN SODIUM 1000 [USP'U]/ML
INJECTION, SOLUTION INTRAVENOUS; SUBCUTANEOUS AS NEEDED
Status: DISCONTINUED | OUTPATIENT
Start: 2017-06-20 | End: 2017-06-20 | Stop reason: HOSPADM

## 2017-06-20 RX ORDER — CODEINE PHOSPHATE AND GUAIFENESIN 10; 100 MG/5ML; MG/5ML
SOLUTION ORAL AS NEEDED
Status: DISCONTINUED | OUTPATIENT
Start: 2017-06-20 | End: 2017-06-20 | Stop reason: HOSPADM

## 2017-06-20 RX ORDER — GABAPENTIN 300 MG/1
600 CAPSULE ORAL 3 TIMES DAILY
COMMUNITY
End: 2021-07-30

## 2017-06-20 RX ADMIN — ACETAMINOPHEN 487.5 MG: 325 TABLET ORAL at 13:55

## 2017-06-20 NOTE — DISCHARGE INSTR - ACTIVITY
No lifting, bending or stairs for 3 days. No bath, hot tub, or pool x 3 days. Today just rest and relax. Keep an eye on site for bleeding, if bleeding should occur, hold pressure and call 911 or drive to nearest hospital ER. Change dressing tomorrow to a band aid.

## 2017-06-21 ENCOUNTER — HOSPITAL ENCOUNTER (OUTPATIENT)
Dept: NUCLEAR MEDICINE | Facility: HOSPITAL | Age: 47
Discharge: HOME | End: 2017-06-21
Attending: INTERNAL MEDICINE

## 2017-06-21 DIAGNOSIS — R06.09 DYSPNEA ON EXERTION: ICD-10-CM

## 2017-06-21 PROCEDURE — 0 TECHNETIUM TC 99M PENTETATE KIT: Performed by: INTERNAL MEDICINE

## 2017-06-21 PROCEDURE — 0 TECHNETIUM ALBUMIN AGGREGATED: Performed by: INTERNAL MEDICINE

## 2017-06-21 PROCEDURE — A9540 TC99M MAA: HCPCS | Performed by: INTERNAL MEDICINE

## 2017-06-21 PROCEDURE — 78582 LUNG VENTILAT&PERFUS IMAGING: CPT

## 2017-06-21 PROCEDURE — A9539 TC99M PENTETATE: HCPCS | Performed by: INTERNAL MEDICINE

## 2017-06-21 PROCEDURE — 78582 LUNG VENTILAT&PERFUS IMAGING: CPT | Performed by: RADIOLOGY

## 2017-06-21 RX ADMIN — Medication 1 DOSE: at 08:39

## 2017-06-21 RX ADMIN — KIT FOR THE PREPARATION OF TECHNETIUM TC 99M PENTETATE 1 DOSE: 20 INJECTION, POWDER, LYOPHILIZED, FOR SOLUTION INTRAVENOUS; RESPIRATORY (INHALATION) at 08:29

## 2017-06-22 ENCOUNTER — TELEPHONE (OUTPATIENT)
Dept: CARDIOLOGY | Facility: CLINIC | Age: 47
End: 2017-06-22

## 2017-06-22 NOTE — TELEPHONE ENCOUNTER
Patient called stated that she is having left side pain and stomach swelling. I advised her to go to the ER.

## 2017-06-27 ENCOUNTER — APPOINTMENT (OUTPATIENT)
Dept: NUCLEAR MEDICINE | Facility: HOSPITAL | Age: 47
End: 2017-06-27
Attending: INTERNAL MEDICINE

## 2017-07-10 DIAGNOSIS — R55 SYNCOPE, UNSPECIFIED SYNCOPE TYPE: Primary | ICD-10-CM

## 2017-09-08 ENCOUNTER — CONSULT (OUTPATIENT)
Dept: CARDIOLOGY | Facility: CLINIC | Age: 47
End: 2017-09-08

## 2017-09-08 VITALS
HEART RATE: 75 BPM | DIASTOLIC BLOOD PRESSURE: 84 MMHG | SYSTOLIC BLOOD PRESSURE: 134 MMHG | BODY MASS INDEX: 19.85 KG/M2 | HEIGHT: 69 IN | WEIGHT: 134 LBS

## 2017-09-08 DIAGNOSIS — R06.83 SNORING: ICD-10-CM

## 2017-09-08 DIAGNOSIS — R00.1 BRADYCARDIA: Primary | ICD-10-CM

## 2017-09-08 DIAGNOSIS — I49.3 PVC'S (PREMATURE VENTRICULAR CONTRACTIONS): ICD-10-CM

## 2017-09-08 PROCEDURE — 99244 OFF/OP CNSLTJ NEW/EST MOD 40: CPT | Performed by: INTERNAL MEDICINE

## 2017-09-08 PROCEDURE — 93000 ELECTROCARDIOGRAM COMPLETE: CPT | Performed by: INTERNAL MEDICINE

## 2017-09-08 RX ORDER — FLECAINIDE ACETATE 50 MG/1
50 TABLET ORAL 2 TIMES DAILY
Qty: 180 TABLET | Refills: 3 | Status: SHIPPED | OUTPATIENT
Start: 2017-09-08 | End: 2020-01-15

## 2017-09-08 NOTE — PROGRESS NOTES
Ernesto Stone  1970  [phone]      09/08/2017    [facility] CARDIOLOGY    Sigifredo Jacques MD  [address]  [facility] [address]    REFERRING DOCTOR : Dr. Clifford         Patient ID: Ernesto Stone is a 47 y.o. female.    Chief Complaint:   Chief Complaint   Patient presents with   • Slow Heart Rate       Allergies   Allergen Reactions   • Antihistamines, Diphenhydramine-Type    • Benadryl [Diphenhydramine]        Current Outpatient Prescriptions:   •  albuterol (PROVENTIL HFA;VENTOLIN HFA) 108 (90 BASE) MCG/ACT inhaler, Inhale 2 puffs every 4 (four) hours as needed for wheezing., Disp: , Rfl:   •  albuterol (PROVENTIL) (2.5 MG/3ML) 0.083% nebulizer solution, Take 2.5 mg by nebulization As Needed., Disp: , Rfl:   •  aspirin 81 MG tablet, Take 1 tablet by mouth Daily., Disp: 30 tablet, Rfl: 3  •  gabapentin (NEURONTIN) 300 MG capsule, Take 300 mg by mouth 3 (Three) Times a Day., Disp: , Rfl:   •  levothyroxine (SYNTHROID, LEVOTHROID) 100 MCG tablet, Take 100 mcg by mouth daily., Disp: , Rfl:     History of Present Illness  Patient presents today for consultation referred by Dr. Clifford regarding bradycardia. She tells me today that she is constantly weak and soa. She said she was told that she had a heart rate in the 30s at times and that she would need a pacemaker. She was hospitalized last year for sepsis and said she has not been able to recover from this. Wore a 30 day event monitor that showed HRs occasionally in the 40s mainly while sleeping. She had a stress test that showed appropriate chronotropic response. Has also had LHC that showed no significant CAD. Normal LVEF. She also reports feeling occasional skipped beats that are worse when she lays on her left side. She continues to smoke. No issues with CP, pnd, edema, orthopnea, fevers, chills.  states she had double pneumonia and sepsis in 2016 is never felt right since then.  She also states  she never gets a good night sleep even though she goes to bed early.  Her  states she snores and wakes up gasping for air at times as well.  Overall her primary doctors check labs with no major abnormalities found and possibly even mentions chronic fatigue syndrome as a possibility.  He states she can feel skipped beats and one in the skipped beats are worse this when she actually feels bad as well.    The following portions of the patient's history were reviewed and updated as appropriate: allergies, current medications, past family history, past medical history, past social history, past surgical history and problem list.    Past Medical History:   Diagnosis Date   • Alcoholic    • Asthma    • Bradycardia    • CHF (congestive heart failure)    • Disease of thyroid gland    • Dyspnea    • Hx of sepsis 10/2016   • Hypertension    • Ulcer of abdomen wall          Past Surgical History:   Procedure Laterality Date   • CARDIAC CATHETERIZATION N/A 2017    Procedure: Left Heart Cath;  Surgeon: Don Clifford MD;  Location: [facility] INVASIVE LOCATION;  Service:    •  SECTION     • FRACTURE SURGERY Left     knee    • FRACTURE SURGERY Left    • KNEE CARTILAGE SURGERY Left        Social History     Social History   • Marital status: Legally      Spouse name: N/A   • Number of children: N/A   • Years of education: N/A     Occupational History   • Not on file.     Social History Main Topics   • Smoking status: Current Every Day Smoker     Packs/day: 1.00     Years: 30.00     Types: Cigarettes   • Smokeless tobacco: Never Used   • Alcohol use Yes   • Drug use: No   • Sexual activity: No     Other Topics Concern   • Not on file     Social History Narrative       Family History   Problem Relation Age of Onset   • Cancer Mother    • Arthritis Mother    • Bradycardia Mother    • Other Mother      Tachycardia          REVIEW OF SYSTEMS:   CONSTITUTIONAL: + weakness, + fatigue No weight loss, fever,  "chills   HEENT: Eyes: No visual loss, blurred vision, double vision or yellow sclerae. Ears, Nose, Throat: No hearing loss, sneezing, congestion, runny nose or sore throat.   SKIN: No rash or itching.     RESPIRATORY: + SOA, No hemoptysis, cough or sputum.   GASTROINTESTINAL: No anorexia, nausea, vomiting or diarrhea. No abdominal pain, bright red blood per rectum or melena.  GENITOURINARY: No burning on urination, hematuria or increased frequency.  NEUROLOGICAL: No headache, dizziness, syncope, paralysis, ataxia, numbness or tingling in the extremities. No change in bowel or bladder control.   MUSCULOSKELETAL: No muscle, back pain, joint pain or stiffness.   HEMATOLOGIC: No anemia, bleeding or bruising.   LYMPHATICS: No enlarged nodes. No history of splenectomy.   PSYCHIATRIC: No history of depression, anxiety, hallucinations.   ENDOCRINOLOGIC: No reports of sweating, cold or heat intolerance. No polyuria or polydipsia.   Ext: No edema or bruising      The patient's old records including ambulatory rhythm recordings (ECGs, Holter/event monitor) were reviewed and discussed.           Objective:       Vitals:    09/08/17 1134   BP: 134/84   BP Location: Left arm   Patient Position: Sitting   Pulse: 75   Weight: 134 lb (60.8 kg)   Height: 69\" (175.3 cm)       Constitutional: oriented to person, place, and time.  well-developed and well-nourished. No distress.   HENT: Normocephalic.   Eyes: Conjunctivae are normal. No scleral icterus.   Neck: Normal carotid pulses, no hepatojugular reflux and no JVD present. Carotid bruit is not present.   Cardiovascular: Normal rate, regular rhythm, S1 normal, S2 normal, normal heart sounds and intact distal pulses. Exam reveals no gallop, no distant heart sounds and no friction rub.    No murmur heard.  Pulses:       Radial pulses are 2+ on the right side, and 2+ on the left side.       Dorsalis pedis pulses are 2+ on the right side, and 2+ on the left side.   Pulmonary/Chest: Effort " normal and breath sounds normal. No respiratory distress. She has no decreased breath sounds.  no wheezes,  Rhonchi or rales.  Abdominal: Soft. Bowel sounds are normal. She exhibits no distension and no mass. There is no tenderness.   Musculoskeletal:  exhibits no edema, tenderness or deformity.   Neurological: is alert and oriented to person, place, and time.   Skin: Skin is warm and dry. No rash noted. No diaphoretic. No cyanosis or erythema. No pallor. Nails show no clubbing.   Psychiatric: Normal mood and affect.Speech is normal and behavior is normal.    Lab Review:     Holter monitor showed average heart rate of 70 bpm.  Low heart rates are between when she is sleeping.  Other than PACs and PVCs no other major issues.        ECG 12 Lead  Date/Time: 9/8/2017 12:24 PM  Performed by: KIM ASHBY  Authorized by: KIM ASHBY   Rhythm: sinus rhythm  Ectopy: PVCs  Comments: HR 70 bpm                Diagnosis:   1. Bradycardia  2. PVC's (premature ventricular contractions)  3. Snoring  4. Chronic Fatigue      Assessment & Plan:     1. Bradycardia w/ reports of HR in 30s. In that the patient heart rates are well mostly while she is sleeping.  Average heart rate of 70 bpm on the monitor.  Today in the office heart rate was 70 bpm as well.  Wore 30 day event that showed majority of rates in 60s-70s with occasionally drop to 40s. Patient has had stress test that showed appropriate response, normal echo, and LHC. She is constantly fatigued, weak, and SOA and I do not feel bradycardia is cause of symptoms and would therefore not benefit from PPM placement and it is not warranted at this time.     2. PVCs w/ complaints of skipped beats and even states that when she has these skipped beats and when there were she feels worse.  Therefore I think trying to suppress these extra heartbeats may be beneficial.  I think this is only one issue that is causing some of her fatigue.  Would start on flecainide 50 g twice a day  and get an EKG on Monday.    3.  Snoring and gasping for air in the middle the night per her .  Even though the patient's body habitus is not the most typical for sleep apnea at think she would benefit from a sleep study to make sure this is why she is not actually sleeping well.  Sleep study.    4.  Chronic fatigue.  Primary physician looking for other causes.  I am concerned that this may be a component chronic fatigue syndrome if no other issues are found as noted above.  I do not think that the PVCs are the only cause of her issues therefore using flecainide to try to suppress them is important but only one component of her symptoms therefore looking for other reasons are important.    5.  Follow-up in one month     Scribed for Gian Pierre DO by Kaelyn Calix PA-C. 9/8/2017  12:10 PM'      SUZANNE Hernandez  09/08/17  12:08 PM     I, Gian Pierre DO, personally performed the services described in this documentation as scribed by the above named individual in my presence, and it is both accurate and complete.  9/8/2017  12:23 PM    Gian Pierre DO  12:23 PM  09/08/17

## 2017-09-25 ENCOUNTER — TELEPHONE (OUTPATIENT)
Dept: CARDIOLOGY | Facility: CLINIC | Age: 47
End: 2017-09-25

## 2019-02-04 NOTE — PROGRESS NOTES
[facility] at [facility]  Consultation History and Physical  Ernesto Stone  1970  [unfilled]  [unfilled]  VISIT DATE:  02/07/19    PCP:   Sigifredo Jacques MD  [address]  [facility] [address]      CC:  Eval palpitations    Problem List:    Prior Cardiac testing:    Stress MPS 4/2017  · Myocardial perfusion imaging indicates a normal myocardial perfusion study with no evidence of ischemia.  · TID 0.97  · Left ventricular ejection fraction is normal (Calculated EF = 68%).  · Impressions are consistent with a low risk study.  · There is no prior study available for comparison.    ECHO 4/2017  Normal left ventricular cavity size and wall thickness noted. All left ventricular wall segments contract normally.  · The aortic valve is structurally normal. No aortic valve regurgitation is present. No aortic valve stenosis is present.  · The mitral valve is abnormal in structure. There are myxomatous changes of the mitral valve apparatus present. Mild mitral valve regurgitation is present. No significant mitral valve stenosis is present.  · The tricuspid valve is normal. No tricuspid valve stenosis is present. No tricuspid valve regurgitation is present. Estimated right ventricular systolic pressure from tricuspid regurgitation is mildly elevated (35-45 mmHg).  There is no evidence of pericardial effusion.    Cardiac Cath 6/2017-normal coronary arteries    Holter 5/2017  Relatively benign study with occasional PACs and PVCs    Most recent lipids total cholesterol 227, triglycerides 104, HDL 65,   Most recent thyroid labs show thyroid function within normal limits    History of Present Illness:  Ernesto Stone  Is a 49 y.o. female with pertinent cardiac history detailed above.  She is referred for evaluation of heart palpitations.  She has a history of hypothyroidism.  She's had some prior cardiac testing which is detailed above.  Most recent outside EKG was from  January 11, 2019 and showed sinus bradycardia with nonspecific ST changes in the lateral leads EKG similar today and appears unchanged 2017.  She is complaining of skipped beats and occasional palpitations worse at night.  She is seen  for this previously and was placed on flecainide 50 mg twice a day for PBC prevention however she was taken off of this about a month ago by her PCP.  She states actively that her palpitation symptoms seemed better over the past week without any other specific do change or intervention.  Denies chest pain or shortness of breath.  She does endorse poor sleep and snoring during her sleep and a prior plan sleep study had not been obtained.  Otherwise no complaints  Patient Active Problem List    Diagnosis Date Noted   • PVC's (premature ventricular contractions) 09/08/2017   • Precordial pain, suspicious for CCS class III angina. 06/15/2017   • Bradycardia 06/13/2017   • HCV (hepatitis C virus) 06/13/2017   • Dyspnea on exertion 04/06/2017   • Tobacco abuse, smoke one pack a day since she was 15 years old.  04/06/2017   • Precordial pain 04/06/2017   • Chronic fatigue 04/06/2017   • Recent severe sepsis with acute organ dysfunction 09/28/2016       Allergies   Allergen Reactions   • Antihistamines, Diphenhydramine-Type    • Benadryl [Diphenhydramine]        Social History     Socioeconomic History   • Marital status: Legally      Spouse name: Not on file   • Number of children: Not on file   • Years of education: Not on file   • Highest education level: Not on file   Social Needs   • Financial resource strain: Not on file   • Food insecurity - worry: Not on file   • Food insecurity - inability: Not on file   • Transportation needs - medical: Not on file   • Transportation needs - non-medical: Not on file   Occupational History   • Not on file   Tobacco Use   • Smoking status: Current Every Day Smoker     Packs/day: 1.00     Years: 30.00     Pack years: 30.00     Types:  "Cigarettes   • Smokeless tobacco: Never Used   Substance and Sexual Activity   • Alcohol use: Yes   • Drug use: No   • Sexual activity: No   Other Topics Concern   • Not on file   Social History Narrative   • Not on file       Family History   Problem Relation Age of Onset   • Cancer Mother    • Arthritis Mother    • Bradycardia Mother    • Other Mother         Tachycardia    • Arrhythmia Mother    • Cirrhosis Father        Current Medications:    Current Outpatient Medications:   •  albuterol (PROVENTIL HFA;VENTOLIN HFA) 108 (90 BASE) MCG/ACT inhaler, Inhale 2 puffs every 4 (four) hours as needed for wheezing., Disp: , Rfl:   •  aspirin 81 MG tablet, Take 1 tablet by mouth Daily., Disp: 30 tablet, Rfl: 3  •  ibuprofen (ADVIL,MOTRIN) 800 MG tablet, Take 800 mg by mouth Every 8 (Eight) Hours As Needed for Mild Pain ., Disp: , Rfl:   •  levothyroxine (SYNTHROID, LEVOTHROID) 100 MCG tablet, Take 100 mcg by mouth daily., Disp: , Rfl:   •  promethazine (PHENERGAN) 25 MG tablet, Take 25 mg by mouth Every 6 (Six) Hours As Needed for Nausea or Vomiting., Disp: , Rfl:   •  albuterol (PROVENTIL) (2.5 MG/3ML) 0.083% nebulizer solution, Take 2.5 mg by nebulization As Needed., Disp: , Rfl:   •  flecainide (TAMBOCOR) 50 MG tablet, Take 1 tablet by mouth 2 (Two) Times a Day., Disp: 180 tablet, Rfl: 3  •  gabapentin (NEURONTIN) 300 MG capsule, Take 300 mg by mouth 3 (Three) Times a Day., Disp: , Rfl:      Review of Systems   Constitution: Positive for malaise/fatigue.   Cardiovascular: Positive for irregular heartbeat and palpitations. Negative for chest pain, dyspnea on exertion and syncope.   All other systems reviewed and are negative.      Vitals:    02/07/19 1334 02/07/19 1335   SpO2:  97%   Weight: 74.9 kg (165 lb 3.2 oz)    Height: 176.5 cm (69.5\")        Physical Exam   Constitutional: She is oriented to person, place, and time. She appears well-developed and well-nourished.   HENT:   Head: Normocephalic and atraumatic. "   Eyes: EOM are normal.   Neck: Neck supple.   Cardiovascular: Normal rate, regular rhythm, normal heart sounds and intact distal pulses. Exam reveals no gallop and no friction rub.   No murmur heard.  Pulmonary/Chest: Effort normal and breath sounds normal.   Abdominal: Soft. Bowel sounds are normal.   Musculoskeletal: Normal range of motion. She exhibits no edema.   Neurological: She is alert and oriented to person, place, and time.   Skin: Skin is warm and dry.   Psychiatric: She has a normal mood and affect.       Diagnostic Data:    ECG 12 Lead  Date/Time: 2/7/2019 2:02 PM  Performed by: Javier Abebe MD  Authorized by: Javier Abebe MD   Rhythm: sinus rhythm  Rate: normal  BPM: 62  T depression: V3 and V4  QRS axis: normal  Clinical impression: abnormal ECG  Clinical impression comment: EKG does not appear significantly changed since 2017            Lab Results   Component Value Date    TRIG 71 05/23/2017    HDL 75 05/23/2017     Lab Results   Component Value Date    GLUCOSE 111 (H) 06/19/2017    BUN 8 06/19/2017    CREATININE 0.76 06/19/2017     06/19/2017    K 4.0 06/19/2017     06/19/2017    CO2 32.8 (H) 06/19/2017     No results found for: HGBA1C  Lab Results   Component Value Date    WBC 6.26 06/19/2017    HGB 12.5 06/19/2017    HCT 39.2 06/19/2017     06/19/2017       Assessment:   Diagnosis Plan   1. PENNY (obstructive sleep apnea)  Ambulatory Referral to Sleep Medicine   2. Palpitations  ECG 12 Lead    Holter Monitor - 72 Hour Up To 21 Days       Plan:      -ASCVD risk low 0.9%  -No recurrent chest pain and had prior normal coronary angiogram.  -T wave inversions in the precordial leads of been present since 2017 at least  -We'll refer to sleep medicine to obtain the previously planned sleep study  -Plan to remain off of flecainide for now and repeat 2 week Holter monitor to see if she still having PVCs or any other arrhythmias  -Has had prior echo showing she has a  structurally normal heart and normal EF  -Follow-up 1 month          Javier Abebe MD

## 2019-02-06 ENCOUNTER — TELEPHONE (OUTPATIENT)
Dept: CARDIOLOGY | Facility: CLINIC | Age: 49
End: 2019-02-06

## 2019-02-06 NOTE — TELEPHONE ENCOUNTER
Called patient to remind them of their appointment on 02/07/2019 @ 1300. Told patient to arrive 30 minutes prior to appointment and bring detailed med list or medication bottles with them.    Left voicemail

## 2019-02-07 ENCOUNTER — CONSULT (OUTPATIENT)
Dept: CARDIOLOGY | Facility: CLINIC | Age: 49
End: 2019-02-07

## 2019-02-07 VITALS — WEIGHT: 165.2 LBS | HEIGHT: 70 IN | OXYGEN SATURATION: 97 % | BODY MASS INDEX: 23.65 KG/M2

## 2019-02-07 DIAGNOSIS — R00.2 PALPITATIONS: ICD-10-CM

## 2019-02-07 DIAGNOSIS — G47.33 OSA (OBSTRUCTIVE SLEEP APNEA): Primary | ICD-10-CM

## 2019-02-07 PROCEDURE — 93000 ELECTROCARDIOGRAM COMPLETE: CPT | Performed by: INTERNAL MEDICINE

## 2019-02-07 PROCEDURE — 99244 OFF/OP CNSLTJ NEW/EST MOD 40: CPT | Performed by: INTERNAL MEDICINE

## 2019-02-07 RX ORDER — PROMETHAZINE HYDROCHLORIDE 25 MG/1
25 TABLET ORAL EVERY 6 HOURS PRN
COMMUNITY
End: 2020-08-14

## 2019-02-07 RX ORDER — IBUPROFEN 800 MG/1
800 TABLET ORAL EVERY 8 HOURS PRN
COMMUNITY
End: 2020-08-14

## 2019-03-06 ENCOUNTER — TELEPHONE (OUTPATIENT)
Dept: CARDIOLOGY | Facility: CLINIC | Age: 49
End: 2019-03-06

## 2019-03-06 NOTE — TELEPHONE ENCOUNTER
Called patient to let her know her monitor resutls per NSK:    Mrs. Stone had 2 short episodes of SVT similar to what she is been diagnosed within the past.  Overall the Holter monitor looked good    Patient verbalized understanding.

## 2019-03-06 NOTE — TELEPHONE ENCOUNTER
----- Message from Javier Abebe MD sent at 2/28/2019  1:09 PM EST -----  Mrs. Stone had 2 short episodes of SVT similar to what she is been diagnosed within the past.  Overall the Holter monitor looked good

## 2019-03-11 ENCOUNTER — TELEPHONE (OUTPATIENT)
Dept: CARDIOLOGY | Facility: CLINIC | Age: 49
End: 2019-03-11

## 2019-03-11 NOTE — TELEPHONE ENCOUNTER
Called patient to remind them of their appointment on 03/12/2019 @ 0930. Told patient to arrive 30 minutes prior to appointment and bring detailed med list or medication bottles with them.    Patient confirmed

## 2019-04-23 ENCOUNTER — TRANSCRIBE ORDERS (OUTPATIENT)
Dept: ADMINISTRATIVE | Facility: HOSPITAL | Age: 49
End: 2019-04-23

## 2019-04-23 DIAGNOSIS — R10.11 ABDOMINAL PAIN, RIGHT UPPER QUADRANT: Primary | ICD-10-CM

## 2019-05-01 ENCOUNTER — HOSPITAL ENCOUNTER (OUTPATIENT)
Dept: ULTRASOUND IMAGING | Facility: HOSPITAL | Age: 49
Discharge: HOME | End: 2019-05-01
Admitting: SURGERY

## 2019-05-01 DIAGNOSIS — R10.11 ABDOMINAL PAIN, RIGHT UPPER QUADRANT: ICD-10-CM

## 2019-05-01 PROCEDURE — 76705 ECHO EXAM OF ABDOMEN: CPT

## 2019-05-01 PROCEDURE — 76705 ECHO EXAM OF ABDOMEN: CPT | Performed by: RADIOLOGY

## 2019-05-06 ENCOUNTER — TRANSCRIBE ORDERS (OUTPATIENT)
Dept: ADMINISTRATIVE | Facility: HOSPITAL | Age: 49
End: 2019-05-06

## 2019-05-06 DIAGNOSIS — R10.11 ABDOMINAL PAIN, RIGHT UPPER QUADRANT: Primary | ICD-10-CM

## 2020-01-15 ENCOUNTER — OFFICE VISIT (OUTPATIENT)
Dept: NEUROSURGERY | Facility: CLINIC | Age: 50
End: 2020-01-15

## 2020-01-15 VITALS — WEIGHT: 146 LBS | TEMPERATURE: 97.6 F | BODY MASS INDEX: 20.9 KG/M2 | HEIGHT: 70 IN

## 2020-01-15 DIAGNOSIS — M54.9 MECHANICAL BACK PAIN: Primary | ICD-10-CM

## 2020-01-15 DIAGNOSIS — M51.36 DEGENERATIVE DISC DISEASE, LUMBAR: ICD-10-CM

## 2020-01-15 DIAGNOSIS — M47.816 FACET ARTHRITIS OF LUMBAR REGION: ICD-10-CM

## 2020-01-15 PROCEDURE — 99243 OFF/OP CNSLTJ NEW/EST LOW 30: CPT | Performed by: NEUROLOGICAL SURGERY

## 2020-01-15 RX ORDER — OXYCODONE HYDROCHLORIDE 15 MG/1
15 TABLET, FILM COATED, EXTENDED RELEASE ORAL EVERY 6 HOURS
Status: ON HOLD | COMMUNITY
End: 2021-08-02

## 2020-01-15 NOTE — PROGRESS NOTES
Patient: Ernesto Stone  : 1970    Primary Care Provider: Sigifredo Jacques MD    Requesting Provider: As above        History    Chief Complaint: Chronic low back pain.    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  This is worse at night when she is lying down.  She has a pressure-like feeling in her low back and tailbone region.  She has had pain running down her legs, left greater than right.  She has had some injections and the pain in her legs is not as prominent.  The pain extends into the left thigh and crosses over at the knee into the shin.  She has no bowel or bladder dysfunction.  She is worse with any sustained sitting, walking, stair climbing.  She continues to smoke heavily.  In the past she has done therapy and been treated with a TENS unit.    Review of Systems   Constitutional: Negative for activity change, appetite change, chills, diaphoresis, fatigue, fever and unexpected weight change.   HENT: Positive for dental problem, ear pain and facial swelling. Negative for congestion, drooling, ear discharge, hearing loss, mouth sores, nosebleeds, postnasal drip, rhinorrhea, sinus pressure, sneezing, sore throat, tinnitus, trouble swallowing and voice change.    Eyes: Negative for photophobia, pain, discharge, redness, itching and visual disturbance.   Respiratory: Negative for apnea, cough, choking, chest tightness, shortness of breath, wheezing and stridor.    Cardiovascular: Negative for chest pain, palpitations and leg swelling.   Gastrointestinal: Negative for abdominal distention, abdominal pain, anal bleeding, blood in stool, constipation, diarrhea, nausea, rectal pain and vomiting.   Endocrine: Negative for cold intolerance, heat intolerance, polydipsia, polyphagia and polyuria.   Genitourinary: Negative for decreased urine volume, difficulty urinating, dysuria, enuresis, flank  "pain, frequency, genital sores, hematuria and urgency.   Musculoskeletal: Negative for arthralgias, back pain, gait problem, joint swelling, myalgias, neck pain and neck stiffness.   Skin: Negative for color change, pallor, rash and wound.   Allergic/Immunologic: Negative for environmental allergies, food allergies and immunocompromised state.   Neurological: Negative for dizziness, tremors, seizures, syncope, facial asymmetry, speech difficulty, weakness, light-headedness, numbness and headaches.   Hematological: Negative for adenopathy. Does not bruise/bleed easily.   Psychiatric/Behavioral: Negative for agitation, behavioral problems, confusion, decreased concentration, dysphoric mood, hallucinations, self-injury, sleep disturbance and suicidal ideas. The patient is not nervous/anxious and is not hyperactive.        The patient's past medical history, past surgical history, family history, and social history have been reviewed at length in the electronic medical record.    Physical Exam:   Temp 97.6 °F (36.4 °C) (Temporal)   Ht 176.5 cm (69.5\")   Wt 66.2 kg (146 lb)   BMI 21.25 kg/m²   CONSTITUTIONAL: Patient is well-nourished, pleasant and appears stated age.  CV: Heart regular rate and rhythm without murmur, rub, or gallop.  PULMONARY: Lungs are clear to ascultation.  MUSCULOSKELETAL:  Straight leg raising is negative.  Malcolm's Sign is negative.  ROM in back normal.  Tenderness in the back to palpation is not observed.  NEUROLOGICAL:  Orientation, memory, attention span, language function, and cognition have been examined and are intact.  Strength is intact in the lower extremities to direct testing.  Muscle tone is normal throughout.  Station and gait are normal.  Sensation is intact to light touch testing throughout.  Deep tendon reflexes are 1+ and symmetrical.  Coordination is intact.      Medical Decision Making    Data Review:   MRI of the lumbar spine dated 2/1/2019 demonstrates diffuse degenerative " disc disease.  There is some chronic Modic endplate changes at L5-S1.  There is prominent central disc bulging at L4-5 that in conjunction with facet and ligamentous hypertrophy fashion is generous spinal stenosis.    Diagnosis:   1.  Mechanical low back pain.  2.  Lumbar stenosis without neurogenic claudication.  3.  Tobacco abuse.    Treatment Options:   Even though the patient has significant stenosis she really does not describe claudication symptoms.  I would probably consider facet blocks and if necessary rhizotomies.  If that is not helpful then a trial of a spinal cord stimulator would probably be a reasonable option.  I discussed the merits of smoking cessation as it relates to her back difficulties.       Diagnosis Plan   1. Mechanical back pain     2. Degenerative disc disease, lumbar     3. Facet arthritis of lumbar region         Scribed for John Bullock MD by Lisa Barker CMA on 01/15/2020 at 11:01 AM      I, Dr. Bullock, personally performed the services described in the documentation, as scribed in my presence, and it is both accurate and complete.

## 2020-06-19 ENCOUNTER — TELEPHONE (OUTPATIENT)
Dept: NEUROSURGERY | Facility: CLINIC | Age: 50
End: 2020-06-19

## 2020-06-19 NOTE — TELEPHONE ENCOUNTER
Provider: DR. SHEETS  Caller: AVERY CRUZ  Phone Number: [phone]  Reason for Call: HER PAIN MANAGEMENT DR WANTS HER TO SCHEDULE APPT.  When was the patient last seen: 1/15/20  Treatment Options:   Even though the patient has significant stenosis she really does not describe claudication symptoms.  I would probably consider facet blocks and if necessary rhizotomies.  If that is not helpful then a trial of a spinal cord stimulator would probably be a reasonable option.  I discussed the merits of smoking cessation as it relates to her back difficulties.    PT DID NOT DO THE FACET BLOCKS OR RHIZOTOMIES.    Where is it located: LOW BACK/ LEGS  Characteristics of symptom/severity: NUMBNESS/RADIATING PAIN  Timing- Is it constant or intermittent: CONSISTENT  What makes it worse: STANDING/WALKING/STEPS/LIFTING  What makes it better: LYING DOWN HELPS LEGS BUT MAKES BACK PAIN WORSE  What therapies/medications have you tried: PHYSICAL THERAPY AND THEY ARE LIMITED ON WHAT THEY CAN DO/OXYCODONE HELPS SOME/GABAPENTIN SEEMS TO HELP SOME

## 2020-08-14 ENCOUNTER — OFFICE VISIT (OUTPATIENT)
Dept: NEUROSURGERY | Facility: CLINIC | Age: 50
End: 2020-08-14

## 2020-08-14 VITALS
SYSTOLIC BLOOD PRESSURE: 106 MMHG | BODY MASS INDEX: 21.98 KG/M2 | TEMPERATURE: 98.2 F | WEIGHT: 148.4 LBS | HEART RATE: 48 BPM | HEIGHT: 69 IN | OXYGEN SATURATION: 96 % | DIASTOLIC BLOOD PRESSURE: 70 MMHG | RESPIRATION RATE: 16 BRPM

## 2020-08-14 DIAGNOSIS — M51.36 DEGENERATIVE DISC DISEASE, LUMBAR: ICD-10-CM

## 2020-08-14 DIAGNOSIS — M54.9 MECHANICAL BACK PAIN: Primary | ICD-10-CM

## 2020-08-14 DIAGNOSIS — M54.16 LUMBAR RADICULOPATHY: ICD-10-CM

## 2020-08-14 PROCEDURE — 99214 OFFICE O/P EST MOD 30 MIN: CPT | Performed by: PHYSICIAN ASSISTANT

## 2020-08-14 NOTE — PROGRESS NOTES
Patient: Ernesto Stone  : 1970  GENDER: female    Primary Care Provider: Sigifredo Jacques MD    Requesting Provider: As above      History    Chief Complaint: low back and left leg pain with walking/standing intolerances     History of Present Illness: Ms. Stone is a 50-year-old disabled female who presented to our service with low back and left lower extremity pain without true neurogenic claudication symptoms.  She was referred to us for consideration of facet blocks/rhizotomies as necessary.  Since last seen in the office, Dr. Fournier administered 3 epidural injections without lasting benefit.  She continues describes low back pain extending posteriorly into her glute, down her posterior thigh/calf-terminating in her heel.  Symptoms are constant and are aggravated by walking >30 minutes or going up/down stairs.  Symptoms do improve slightly in her leg with laying flat, however this does not help her back or hip discomfort.  More recently, her left leg feels tired with sustained activity, however she denies overt weakness.  She has episodic urinary urgency without alton incontinence.  She denies bowel dysfunction.  She continues her oxycodone 15 mg every 6 hours.  She has no other complaints at this time.    Review of Systems   Constitutional: Positive for activity change and fatigue.   HENT: Negative.    Eyes: Negative.    Respiratory: Negative.    Cardiovascular: Negative.    Gastrointestinal: Negative.    Endocrine: Positive for cold intolerance and heat intolerance.   Genitourinary: Negative.    Musculoskeletal: Positive for arthralgias, back pain, gait problem, joint swelling, myalgias, neck pain and neck stiffness.   Skin: Negative.    Allergic/Immunologic: Negative.    Neurological: Positive for dizziness, weakness and numbness.   Hematological: Positive for adenopathy.   Psychiatric/Behavioral: Negative.        The patient's review of systems, past medical history, past surgical  "history, family history, and social history have been reviewed at length in the electronic medical record.    Physical Exam:   /70   Pulse (!) 48   Temp 98.2 °F (36.8 °C) (Temporal)   Resp 16   Ht 176.5 cm (69.49\")   Wt 67.3 kg (148 lb 6.4 oz)   SpO2 96%   BMI 21.61 kg/m²   CONSTITUTIONAL:   - Patient is well-nourished  - Pleasant and appears stated age.  CV:   - Regular rate and rhythm on palpable radial pulse   - No murmur appreciated   PULMONARY:   - Speaking in full sentences  - No use of accessory muscles   - Breathing is non-labored   - No wheezing   MUSCULOSKELETAL:  - Low back tenderness to palpation is not observed.   - ROM in back is normal.  - Straight leg raise is negative   - Malcolm's sign is negative   NEUROLOGICAL:  - A&Ox3  - Attention span, language function, and cognition are intact.  - Strength is intact in the upper and lower extremities to direct testing.  - Muscle tone is normal throughout.  - Station and gait are normal.  - Sensation is intact to light touch testing throughout.  - Deep tendon reflexes are 2+ and symmetrical.    - Galvan's Sign is negative bilaterally.  - No clonus is elicited.  - Coordination is intact.    Body mass index is 21.61 kg/m².   Patient's Body mass index is 21.61 kg/m². BMI is within normal parameters. No follow-up required..         Medical Decision Making    Data Review:   1. MRI Lumbar Spine (2/01/2019): Demonstrated diffuse degenerative disc disease with chronic Modic endplate changes at L5-S1.  Additionally there is a central disc bulge at L4-5 with associated ligamentous hypertrophy without overt stenosis.    Diagnosis/Treatment Options:  1. Mechanical back pain  2. Degenerative disc disease, lumbar  3. Lumbar radiculopathy    - MRI Lumbar Spine Without Contrast       Follow up:  Ms. Stone is seen today in follow-up with progressive low back and left lower extremity pain with new walking/standing intolerances.  I referred her for a MRI of the " lumbar spine without gadolinium for additional review.  Patient will follow-up in the office thereafter for reassessment.  Treatment recommendations to follow pending findings.  Discussion at the beginning of the year included a spinal cord stimulator trial.    Hayley Robison PA-C   8/14/2020   10:45

## 2020-08-28 ENCOUNTER — OFFICE VISIT (OUTPATIENT)
Dept: NEUROSURGERY | Facility: CLINIC | Age: 50
End: 2020-08-28

## 2020-08-28 VITALS — WEIGHT: 142.6 LBS | BODY MASS INDEX: 21.12 KG/M2 | TEMPERATURE: 98.9 F | HEIGHT: 69 IN

## 2020-08-28 DIAGNOSIS — M51.36 DEGENERATIVE DISC DISEASE, LUMBAR: ICD-10-CM

## 2020-08-28 DIAGNOSIS — M54.16 LUMBAR RADICULOPATHY: ICD-10-CM

## 2020-08-28 DIAGNOSIS — M48.062 SPINAL STENOSIS, LUMBAR REGION, WITH NEUROGENIC CLAUDICATION: Primary | ICD-10-CM

## 2020-08-28 DIAGNOSIS — M47.816 FACET ARTHRITIS OF LUMBAR REGION: ICD-10-CM

## 2020-08-28 PROCEDURE — 99214 OFFICE O/P EST MOD 30 MIN: CPT | Performed by: PHYSICIAN ASSISTANT

## 2020-08-28 RX ORDER — FAMOTIDINE 10 MG
20 TABLET ORAL
Status: CANCELLED | OUTPATIENT
Start: 2020-08-28

## 2020-08-28 RX ORDER — SODIUM CHLORIDE 9 MG/ML
100 INJECTION, SOLUTION INTRAVENOUS CONTINUOUS
Status: CANCELLED | OUTPATIENT
Start: 2020-08-28

## 2020-08-28 NOTE — PROGRESS NOTES
"Patient: Ernesto Stone  : 1970  GENDER: female    Primary Care Provider: Sigifredo Jacques MD    Requesting Provider: As above      History    Chief Complaint: Low back and left leg pain with walking/standing intolerances    History of Present Illness: Ms. Stone is a 50-year-old disabled female who previously presented to our service with low back and left lower extremity pain without true neurogenic claudication symptoms.  She was referred to pain management for consideration of facet blocks/rhizotomies as necessary.  She underwent 3 epidural injections without lasting benefit with Dr. Fournier.  Since last seen in the office her low back complaints have progressed and now she complains mainly of left leg pain.  Her symptoms begin in her left glutes, continuing posteriorly down her thigh, wrapping anteriorly down her shin- terminating in the ankle.  Symptoms are constant and are aggravated by walking >30 minutes, or when transitioning up/down stairs.  Symptoms in her leg do improve with laying flat, however this does not help her back or hip discomfort.  She note a \"tiredness\" to her legs with sustained activity-however she denies overt weakness.  She admits to episodic urinary urgency without alton incontinence.  She denies bowel dysfunction.  She continues her oxycodone 15 mg every 6 hours.  She has no other complaints at this time.    Review of Systems   Constitutional: Positive for activity change and fatigue. Negative for appetite change, chills, diaphoresis, fever and unexpected weight change.   HENT: Negative.  Negative for congestion, dental problem, drooling, ear discharge, ear pain, facial swelling, hearing loss, mouth sores, nosebleeds, postnasal drip, rhinorrhea, sinus pressure, sneezing, sore throat, tinnitus, trouble swallowing and voice change.    Eyes: Negative.  Negative for photophobia, pain, discharge, redness, itching and visual disturbance.   Respiratory: Negative.  Negative " "for apnea, cough, choking, chest tightness, shortness of breath, wheezing and stridor.    Cardiovascular: Negative.  Negative for chest pain, palpitations and leg swelling.   Gastrointestinal: Negative.  Negative for abdominal distention, abdominal pain, anal bleeding, blood in stool, constipation, diarrhea, nausea, rectal pain and vomiting.   Endocrine: Positive for cold intolerance and heat intolerance. Negative for polydipsia, polyphagia and polyuria.   Genitourinary: Negative.  Negative for decreased urine volume, difficulty urinating, dysuria, enuresis, flank pain, frequency, genital sores, hematuria and urgency.   Musculoskeletal: Positive for arthralgias, back pain, gait problem, joint swelling, myalgias, neck pain and neck stiffness.   Skin: Negative.  Negative for color change, pallor, rash and wound.   Allergic/Immunologic: Negative.  Negative for environmental allergies, food allergies and immunocompromised state.   Neurological: Positive for dizziness, weakness and numbness. Negative for tremors, seizures, syncope, facial asymmetry, speech difficulty, light-headedness and headaches.   Hematological: Positive for adenopathy. Does not bruise/bleed easily.   Psychiatric/Behavioral: Negative.  Negative for agitation, behavioral problems, confusion, decreased concentration, dysphoric mood, hallucinations, self-injury, sleep disturbance and suicidal ideas. The patient is not nervous/anxious and is not hyperactive.        The patient's review of systems, past medical history, past surgical history, family history, and social history have been reviewed at length in the electronic medical record.    Physical Exam:   Temp 98.9 °F (37.2 °C) (Infrared)   Ht 176.3 cm (69.4\")   Wt 64.7 kg (142 lb 9.6 oz)   BMI 20.82 kg/m²   CONSTITUTIONAL:   - Patient is well-nourished  - Pleasant and appears stated age.  CV:   - Regular rate and rhythm on palpable radial pulse   - No murmur appreciated   PULMONARY:   - Speaking in " full sentences  - No use of accessory muscles   - Breathing is non-labored   - No wheezing   MUSCULOSKELETAL:  - Low back tenderness to palpation is not observed.   - ROM in back is normal.  - Straight leg raise is negative   - Malcolm's sign is negative   NEUROLOGICAL:  - A&Ox3  - Attention span, language function, and cognition are intact.  - Strength is intact in the upper and lower extremities to direct testing.  - Muscle tone is normal throughout.  - Station and gait are normal.  - Sensation is intact to light touch testing throughout.  - Deep tendon reflexes are 2+ and symmetrical.    - Galvan's Sign is negative bilaterally.  - No clonus is elicited.  - Coordination is intact.    Body mass index is 20.82 kg/m².   Patient's Body mass index is 20.82 kg/m². BMI is within normal parameters. No follow-up required..         Medical Decision Making    Data Review:   1. MRI Lumbar Spine (8/20/2020): Demonstrates diffuse degenerative disc disease with a prominent central disc protrusion at L4-5 with generous spinal stenosis.    Diagnosis/Treatment Options:  1. Spinal stenosis, lumbar region, with neurogenic claudication  2. Degenerative disc disease, lumbar  3. Lumbar radiculopathy  4. Facet arthritis of lumbar region    - Case Request; Standing  - famotidine (PEPCID) tablet 20 mg  - CBC and Differential; Future  - Basic metabolic panel; Future  - MRSA Screen Culture (Outpatient) - Swab, Nares; Future  - sodium chloride 0.9 % infusion  - ceFAZolin (ANCEF) 2 g in sodium chloride 0.9 % 100 mL IVPB  - Case Request         Follow up:  Ms. Stone is seen today in follow-up with a new MRI of the lumbar spine that demonstrates generous spinal stenosis at L4-5 that correlates with clinical symptoms.  After discussing with Dr. Bullock we have recommended proceeding with an L4-5 laminectomy with possible discectomy.  I explained the procedure in detail including the risks, complications, and limitations.  Patient has verbalized  understanding and has agreed to proceed with surgery.  Although surgery may greatly improve her left leg complaints -it may not eradicate all.      Additionally, Ms. Stone currently smokes 1/2 ppd -which is an improvement from 1-1.5 ppd.  Again, I counseled on the importance of smoking cessation for optimal surgical outcome.  Patient will work on quitting prior to surgery.    Hayley Robison PA-C   8/28/2020   15:01

## 2020-08-28 NOTE — H&P
"Patient: Ernesto Stone  : 1970  GENDER: female    Primary Care Provider: Sigifredo Jacques MD    Requesting Provider: As above      History    Chief Complaint: Low back and left leg pain with walking/standing intolerances    History of Present Illness: Ms. Stone is a 50-year-old disabled female who previously presented to our service with low back and left lower extremity pain without true neurogenic claudication symptoms.  She was referred to pain management for consideration of facet blocks/rhizotomies as necessary.  She underwent 3 epidural injections without lasting benefit with Dr. Fournier.  Since last seen in the office her low back complaints have progressed and now she complains mainly of left leg pain.  Her symptoms begin in her left glutes, continuing posteriorly down her thigh, wrapping anteriorly down her shin- terminating in the ankle.  Symptoms are constant and are aggravated by walking >30 minutes, or when transitioning up/down stairs.  Symptoms in her leg do improve with laying flat, however this does not help her back or hip discomfort.  She note a \"tiredness\" to her legs with sustained activity-however she denies overt weakness.  She admits to episodic urinary urgency without alton incontinence.  She denies bowel dysfunction.  She continues her oxycodone 15 mg every 6 hours.  She has no other complaints at this time.    Review of Systems   Constitutional: Positive for activity change and fatigue. Negative for appetite change, chills, diaphoresis, fever and unexpected weight change.   HENT: Negative.  Negative for congestion, dental problem, drooling, ear discharge, ear pain, facial swelling, hearing loss, mouth sores, nosebleeds, postnasal drip, rhinorrhea, sinus pressure, sneezing, sore throat, tinnitus, trouble swallowing and voice change.    Eyes: Negative.  Negative for photophobia, pain, discharge, redness, itching and visual disturbance.   Respiratory: Negative.  Negative " for apnea, cough, choking, chest tightness, shortness of breath, wheezing and stridor.    Cardiovascular: Negative.  Negative for chest pain, palpitations and leg swelling.   Gastrointestinal: Negative.  Negative for abdominal distention, abdominal pain, anal bleeding, blood in stool, constipation, diarrhea, nausea, rectal pain and vomiting.   Endocrine: Positive for cold intolerance and heat intolerance. Negative for polydipsia, polyphagia and polyuria.   Genitourinary: Negative.  Negative for decreased urine volume, difficulty urinating, dysuria, enuresis, flank pain, frequency, genital sores, hematuria and urgency.   Musculoskeletal: Positive for arthralgias, back pain, gait problem, joint swelling, myalgias, neck pain and neck stiffness.   Skin: Negative.  Negative for color change, pallor, rash and wound.   Allergic/Immunologic: Negative.  Negative for environmental allergies, food allergies and immunocompromised state.   Neurological: Positive for dizziness, weakness and numbness. Negative for tremors, seizures, syncope, facial asymmetry, speech difficulty, light-headedness and headaches.   Hematological: Positive for adenopathy. Does not bruise/bleed easily.   Psychiatric/Behavioral: Negative.  Negative for agitation, behavioral problems, confusion, decreased concentration, dysphoric mood, hallucinations, self-injury, sleep disturbance and suicidal ideas. The patient is not nervous/anxious and is not hyperactive.      Past Medical History:   Diagnosis Date   • Alcoholic (CMS/HCC)    • Asthma    • Bradycardia    • Disease of thyroid gland    • Dyspnea    • Hx of sepsis 10/2016   • Ulcer of abdomen wall (CMS/HCC)      Past Surgical History:   Procedure Laterality Date   • CARDIAC CATHETERIZATION N/A 2017    Procedure: Left Heart Cath;  Surgeon: Don Clifford MD;  Location: [facility] INVASIVE LOCATION;  Service:    •  SECTION     • CHOLECYSTECTOMY     • FRACTURE SURGERY Left     knee    •  "FRACTURE SURGERY Left    • KNEE CARTILAGE SURGERY Left    • WRIST SURGERY         Current Outpatient Medications:   •  albuterol (PROVENTIL HFA;VENTOLIN HFA) 108 (90 BASE) MCG/ACT inhaler, Inhale 2 puffs every 4 (four) hours as needed for wheezing., Disp: , Rfl:   •  albuterol (PROVENTIL) (2.5 MG/3ML) 0.083% nebulizer solution, Take 2.5 mg by nebulization As Needed., Disp: , Rfl:   •  gabapentin (NEURONTIN) 300 MG capsule, Take 600 mg by mouth 3 (Three) Times a Day., Disp: , Rfl:   •  levothyroxine (SYNTHROID, LEVOTHROID) 100 MCG tablet, Take 100 mcg by mouth daily., Disp: , Rfl:   •  oxyCODONE ER (oxyCONTIN) 15 MG tablet extended-release 12 hour, Take 15 mg by mouth Every 12 (Twelve) Hours., Disp: , Rfl:     Allergies   Allergen Reactions   • Antihistamines, Diphenhydramine-Type    • Benadryl [Diphenhydramine]        The patient's review of systems, past medical history, past surgical history, family history, and social history have been reviewed at length in the electronic medical record.    Physical Exam:   Temp 98.9 °F (37.2 °C) (Infrared)   Ht 176.3 cm (69.4\")   Wt 64.7 kg (142 lb 9.6 oz)   BMI 20.82 kg/m²   CONSTITUTIONAL:   - Patient is well-nourished  - Pleasant and appears stated age.  CV:   - Regular rate and rhythm on palpable radial pulse   - No murmur appreciated   PULMONARY:   - Speaking in full sentences  - No use of accessory muscles   - Breathing is non-labored   - No wheezing   MUSCULOSKELETAL:  - Low back tenderness to palpation is not observed.   - ROM in back is normal.  - Straight leg raise is negative   - Malcolm's sign is negative   NEUROLOGICAL:  - A&Ox3  - Attention span, language function, and cognition are intact.  - Strength is intact in the upper and lower extremities to direct testing.  - Muscle tone is normal throughout.  - Station and gait are normal.  - Sensation is intact to light touch testing throughout.  - Deep tendon reflexes are 2+ and symmetrical.    - Galvan's Sign is " negative bilaterally.  - No clonus is elicited.  - Coordination is intact.    Body mass index is 20.82 kg/m².   Patient's Body mass index is 20.82 kg/m². BMI is within normal parameters. No follow-up required..         Medical Decision Making    Data Review:   1. MRI Lumbar Spine (8/20/2020): Demonstrates diffuse degenerative disc disease with a prominent central disc protrusion at L4-5 with generous spinal stenosis.    Diagnosis/Treatment Options:  1. Spinal stenosis, lumbar region, with neurogenic claudication  2. Degenerative disc disease, lumbar  3. Lumbar radiculopathy  4. Facet arthritis of lumbar region    - Case Request; Standing  - famotidine (PEPCID) tablet 20 mg  - CBC and Differential; Future  - Basic metabolic panel; Future  - MRSA Screen Culture (Outpatient) - Swab, Nares; Future  - sodium chloride 0.9 % infusion  - ceFAZolin (ANCEF) 2 g in sodium chloride 0.9 % 100 mL IVPB  - Case Request         Follow up:  Ms. Stone is seen today in follow-up with a new MRI of the lumbar spine that demonstrates generous spinal stenosis at L4-5 that correlates with clinical symptoms.  After discussing with Dr. Bullock we have recommended proceeding with an L4-5 laminectomy with possible discectomy.  I explained the procedure in detail including the risks, complications, and limitations.  Patient has verbalized understanding and has agreed to proceed with surgery.  Although surgery may greatly improve her left leg complaints -it may not eradicate all.      Additionally, Ms. Stone currently smokes 1/2 ppd -which is an improvement from 1-1.5 ppd.  Again, I counseled on the importance of smoking cessation for optimal surgical outcome.  Patient will work on quitting prior to surgery.    Hayley Robison PA-C   8/28/2020   15:01

## 2020-09-15 ENCOUNTER — APPOINTMENT (OUTPATIENT)
Dept: PREADMISSION TESTING | Facility: HOSPITAL | Age: 50
End: 2020-09-15

## 2020-11-11 ENCOUNTER — TELEPHONE (OUTPATIENT)
Dept: NEUROSURGERY | Facility: CLINIC | Age: 50
End: 2020-11-11

## 2020-11-11 NOTE — TELEPHONE ENCOUNTER
PT IS CALLING ABOUT NEEDING TO SCHEDULE SURGERY.  SHE HOWEVER DID FALL OFF A LADDER AND DR TREVINO SAID THAT SHE HAD A HAIRLINE FRACTURE TO THE PELVIS AREA. SHE DID NOT KNOW IF YOU WOULD WANT ADDITIONAL IMAGING BEFORE HER SURGERY DUE TO HER FALLING OFF THE LADDER.  PT NUMBER [phone]  PLEASE ADVISE  THANK YOU

## 2020-11-11 NOTE — TELEPHONE ENCOUNTER
Called and spoke with pt.   - she fell off ladder 3+ weeks ago and remains asymptomatic     Therefore, there is no need to re-image prior to scheduling surgery.     Rizwana can we please schedule her for lumbar laminectomy @L4-5 with possible discectomy     Thanks!

## 2020-11-13 ENCOUNTER — PREP FOR SURGERY (OUTPATIENT)
Dept: OTHER | Facility: HOSPITAL | Age: 50
End: 2020-11-13

## 2020-11-13 DIAGNOSIS — M48.062 SPINAL STENOSIS, LUMBAR REGION, WITH NEUROGENIC CLAUDICATION: Primary | ICD-10-CM

## 2020-11-23 ENCOUNTER — TRANSCRIBE ORDERS (OUTPATIENT)
Dept: ADMINISTRATIVE | Facility: HOSPITAL | Age: 50
End: 2020-11-23

## 2020-11-23 DIAGNOSIS — Z01.818 PRE-OPERATIVE CLEARANCE: Primary | ICD-10-CM

## 2020-11-27 ENCOUNTER — LAB (OUTPATIENT)
Dept: LAB | Facility: HOSPITAL | Age: 50
End: 2020-11-27

## 2020-11-27 DIAGNOSIS — Z01.818 PRE-OPERATIVE CLEARANCE: ICD-10-CM

## 2020-11-30 ENCOUNTER — TELEPHONE (OUTPATIENT)
Dept: NEUROSURGERY | Facility: CLINIC | Age: 50
End: 2020-11-30

## 2020-11-30 NOTE — TELEPHONE ENCOUNTER
Left a voicemail for patient to call me back with the covid results once she receives them.   (I didn't talk to the patient last week about her being exposed to covid.)

## 2020-11-30 NOTE — TELEPHONE ENCOUNTER
Pt is in quarantine and had to cancel her surgery. She was at a family dinner.     Pt says she called the office last week to let Rizwana know.  Pt is going to be tested today for covid.  She will call back and let Rizwana know the results

## 2021-03-31 ENCOUNTER — OFFICE VISIT (OUTPATIENT)
Dept: NEUROSURGERY | Facility: CLINIC | Age: 51
End: 2021-03-31

## 2021-03-31 VITALS
BODY MASS INDEX: 22.33 KG/M2 | HEIGHT: 69 IN | WEIGHT: 150.8 LBS | TEMPERATURE: 97.1 F | SYSTOLIC BLOOD PRESSURE: 110 MMHG | DIASTOLIC BLOOD PRESSURE: 58 MMHG

## 2021-03-31 DIAGNOSIS — Z71.6 ENCOUNTER FOR SMOKING CESSATION COUNSELING: ICD-10-CM

## 2021-03-31 DIAGNOSIS — M51.36 DEGENERATIVE DISC DISEASE, LUMBAR: ICD-10-CM

## 2021-03-31 DIAGNOSIS — M54.16 LUMBAR RADICULOPATHY: ICD-10-CM

## 2021-03-31 DIAGNOSIS — M47.816 FACET ARTHRITIS OF LUMBAR REGION: ICD-10-CM

## 2021-03-31 DIAGNOSIS — M48.062 SPINAL STENOSIS, LUMBAR REGION, WITH NEUROGENIC CLAUDICATION: Primary | ICD-10-CM

## 2021-03-31 DIAGNOSIS — M54.9 MECHANICAL BACK PAIN: ICD-10-CM

## 2021-03-31 PROCEDURE — 99213 OFFICE O/P EST LOW 20 MIN: CPT | Performed by: PHYSICIAN ASSISTANT

## 2021-03-31 NOTE — PROGRESS NOTES
"Patient: Ernesto Stone  : 1970  Chart #: [id number]    Date of Service: 2021    CHIEF COMPLAINT: Low back and left leg pain with walking and standing intolerance    History of Present Illness Ms. Stone is a very pleasant 50-year-old disabled woman who previously presented to our service with low back and left lower extremity pain worse with standing and walking.  Pain radiates from the low back down the left leg into the shin and lateral ankle.  Symptoms are worse when walking greater than 30 minutes or when transitioning up and down stairs.  Symptoms in her leg improve when lying down-low back and hip pain persist in any position.  She complains of a \"tiredness\" in her legs with sustained activity-however she denies overt weakness.  She admits to episodic urinary urgency without alton incontinence.  She denies bowel dysfunction.  She is treated with oxycodone 15 mg every 6 hours.    Patient was last seen on 2020.  At that time a MRI of the lumbar spine demonstrated diffuse degenerative disc disease with prominent central disc protrusion at L4-5 with generous spinal stenosis.  Dr. Bullock recommended L4-5 laminectomy with possible discectomy.  Surgery was delayed after patient fell from a ladder and fractured her pelvis.  Then she was in quarantine due to a Covid exposure. Today she continues to complain of back and left leg pain- same as before. Pain is constant.  She wishes to move forward with surgery.       Past Medical History:   Diagnosis Date   • Alcoholic (CMS/HCC)    • Asthma    • Bradycardia    • Disease of thyroid gland    • Dyspnea    • Hx of sepsis 10/2016   • Ulcer of abdomen wall (CMS/[facility])          Current Outpatient Medications:   •  albuterol (PROVENTIL HFA;VENTOLIN HFA) 108 (90 BASE) MCG/ACT inhaler, Inhale 2 puffs every 4 (four) hours as needed for wheezing., Disp: , Rfl:   •  albuterol (PROVENTIL) (2.5 MG/3ML) 0.083% nebulizer solution, Take 2.5 mg by nebulization As " Needed., Disp: , Rfl:   •  gabapentin (NEURONTIN) 300 MG capsule, Take 600 mg by mouth 3 (Three) Times a Day., Disp: , Rfl:   •  levothyroxine (SYNTHROID, LEVOTHROID) 100 MCG tablet, Take 100 mcg by mouth daily., Disp: , Rfl:   •  oxyCODONE ER (oxyCONTIN) 15 MG tablet extended-release 12 hour, Take 15 mg by mouth Every 12 (Twelve) Hours., Disp: , Rfl:     Past Surgical History:   Procedure Laterality Date   • CARDIAC CATHETERIZATION N/A 2017    Procedure: Left Heart Cath;  Surgeon: Don Clifford MD;  Location: [facility] CATH INVASIVE LOCATION;  Service:    •  SECTION     • CHOLECYSTECTOMY     • FRACTURE SURGERY Left     knee    • FRACTURE SURGERY Left    • KNEE CARTILAGE SURGERY Left    • WRIST SURGERY         Social History     Socioeconomic History   • Marital status: Legally      Spouse name: Not on file   • Number of children: Not on file   • Years of education: Not on file   • Highest education level: Not on file   Tobacco Use   • Smoking status: Current Every Day Smoker     Packs/day: 0.50     Years: 30.00     Pack years: 15.00     Types: Cigarettes   • Smokeless tobacco: Never Used   Vaping Use   • Vaping Use: Every day   Substance and Sexual Activity   • Alcohol use: Not Currently   • Drug use: No   • Sexual activity: Defer         Review of Systems   Constitutional: Negative for activity change, appetite change, chills and fever.   HENT: Negative for facial swelling, hearing loss, tinnitus, trouble swallowing and voice change.    Eyes: Negative for pain and visual disturbance.   Respiratory: Negative for apnea and shortness of breath.    Cardiovascular: Negative for leg swelling.   Gastrointestinal: Negative for constipation, nausea and vomiting.   Genitourinary: Negative for difficulty urinating and dysuria.   Musculoskeletal: Positive for back pain and gait problem. Negative for joint swelling, neck pain and neck stiffness.   Skin: Negative for color change.   Neurological: Positive  "for numbness. Negative for dizziness, facial asymmetry, speech difficulty and weakness.   Psychiatric/Behavioral: Negative for behavioral problems, confusion, dysphoric mood and sleep disturbance. The patient is not nervous/anxious.        Objective   Vital Signs: Blood pressure 110/58, temperature 97.1 °F (36.2 °C), height 175.3 cm (69\"), weight 68.4 kg (150 lb 12.8 oz).  Physical Exam  Vitals and nursing note reviewed.   Constitutional:       General: She is not in acute distress.     Appearance: She is well-developed.   HENT:      Head: Normocephalic and atraumatic.   Eyes:      Pupils: Pupils are equal, round, and reactive to light.   Cardiovascular:      Heart sounds: Normal heart sounds.   Pulmonary:      Breath sounds: Normal breath sounds.   Psychiatric:         Behavior: Behavior normal.         Thought Content: Thought content normal.     Musculoskeletal:     Strength is intact in upper and lower extremities to direct testing.     Gait it antalgic      Straight leg raise is positive at 15 degrees on the left   Neurologic:     Muscle tone is normal throughout.     Coordination is intact.     Deep tendon reflexes: 2+ and symmetrical.     Sensation is intact to light touch throughout.     Patient is oriented to person, place, and time.         Independent review of radiographic imaging: patient did not bring her old MRI for review.     Assessment/Plan   Diagnosis: Disc herniation L4-5 with spinal stenosis    Medical Decision Making: Patient is seen in follow up today to discuss surgery.  We last saw her 7 months ago at which time decompressive laminectomy and possible discectomy at L4-5 was recommended based on clinical and radiographic picture.  Patient was unable to follow through with surgery due to another injury and setbacks related to Covid.  She presents today with ongoing complaints, unchanged from last visit.  I am going to go ahead and check a new MRI of the lumbar spine for surgical planning.  She " will follow-up in our clinic for review and further recommendations will be made at that time.    Diagnoses and all orders for this visit:    1. Spinal stenosis, lumbar region, with neurogenic claudication (Primary)    2. Degenerative disc disease, lumbar  -     MRI Lumbar Spine Without Contrast; Future    3. Lumbar radiculopathy  -     MRI Lumbar Spine Without Contrast; Future    4. Facet arthritis of lumbar region    5. Mechanical back pain    6. Encounter for smoking cessation counseling                        Patient's Body mass index is 22.27 kg/m². BMI is within normal parameters. No follow-up required..     I discussed >3m the need to STOP smoking, there is a direct correlation between smoking and wound healing and degenerative disc disease. Patient will take this under advisement and discuss with their primary provider.        Luna Cates PA-C  Patient Care Team:  Sigifredo Jacques MD as PCP - General (Family Medicine)

## 2021-04-19 ENCOUNTER — APPOINTMENT (OUTPATIENT)
Dept: MRI IMAGING | Facility: HOSPITAL | Age: 51
End: 2021-04-19

## 2021-05-27 ENCOUNTER — APPOINTMENT (OUTPATIENT)
Dept: MRI IMAGING | Facility: HOSPITAL | Age: 51
End: 2021-05-27

## 2021-06-09 ENCOUNTER — APPOINTMENT (OUTPATIENT)
Dept: MRI IMAGING | Facility: HOSPITAL | Age: 51
End: 2021-06-09

## 2021-06-28 ENCOUNTER — HOSPITAL ENCOUNTER (OUTPATIENT)
Dept: MRI IMAGING | Facility: HOSPITAL | Age: 51
Discharge: HOME | End: 2021-06-28
Admitting: PHYSICIAN ASSISTANT

## 2021-06-28 DIAGNOSIS — M54.16 LUMBAR RADICULOPATHY: ICD-10-CM

## 2021-06-28 DIAGNOSIS — M51.36 DEGENERATIVE DISC DISEASE, LUMBAR: ICD-10-CM

## 2021-06-28 PROCEDURE — 72148 MRI LUMBAR SPINE W/O DYE: CPT | Performed by: RADIOLOGY

## 2021-06-28 PROCEDURE — 72148 MRI LUMBAR SPINE W/O DYE: CPT

## 2021-07-02 ENCOUNTER — OFFICE VISIT (OUTPATIENT)
Dept: NEUROSURGERY | Facility: CLINIC | Age: 51
End: 2021-07-02

## 2021-07-02 VITALS
HEIGHT: 69 IN | WEIGHT: 143 LBS | DIASTOLIC BLOOD PRESSURE: 55 MMHG | SYSTOLIC BLOOD PRESSURE: 104 MMHG | TEMPERATURE: 97.8 F | BODY MASS INDEX: 21.18 KG/M2

## 2021-07-02 DIAGNOSIS — Z71.6 ENCOUNTER FOR SMOKING CESSATION COUNSELING: ICD-10-CM

## 2021-07-02 DIAGNOSIS — M51.36 DEGENERATIVE DISC DISEASE, LUMBAR: ICD-10-CM

## 2021-07-02 DIAGNOSIS — M48.062 SPINAL STENOSIS, LUMBAR REGION, WITH NEUROGENIC CLAUDICATION: Primary | ICD-10-CM

## 2021-07-02 DIAGNOSIS — M54.9 MECHANICAL BACK PAIN: ICD-10-CM

## 2021-07-02 DIAGNOSIS — M47.816 FACET ARTHRITIS OF LUMBAR REGION: ICD-10-CM

## 2021-07-02 DIAGNOSIS — M54.16 LUMBAR RADICULOPATHY: ICD-10-CM

## 2021-07-02 PROCEDURE — 99214 OFFICE O/P EST MOD 30 MIN: CPT | Performed by: PHYSICIAN ASSISTANT

## 2021-07-02 RX ORDER — NICOTINE 21 MG/24HR
1 PATCH, TRANSDERMAL 24 HOURS TRANSDERMAL EVERY 24 HOURS
Qty: 28 PATCH | Refills: 0 | Status: SHIPPED | OUTPATIENT
Start: 2021-07-02 | End: 2021-11-09

## 2021-07-02 RX ORDER — SODIUM CHLORIDE 9 MG/ML
100 INJECTION, SOLUTION INTRAVENOUS CONTINUOUS
Status: CANCELLED | OUTPATIENT
Start: 2021-07-02

## 2021-07-02 RX ORDER — FAMOTIDINE 10 MG
20 TABLET ORAL
Status: CANCELLED | OUTPATIENT
Start: 2021-07-02

## 2021-07-02 NOTE — H&P
"Patient: Ernesto Stone  : 1970  Chart #: [id number]    Date of Service: 2021    CHIEF COMPLAINT: Low back and left leg pain with walking and standing intolerance    History of Present Illness Ms. Stnoe is a pleasant 51-year-old disabled woman who previously presented to our service with low back and left lower extremity pain worse with standing and walking.  Pain radiates from the low back down the left leg into the shin and lateral ankle.  Symptoms are worse when walking greater than 30 minutes or when transitioning up and down stairs.  Symptoms in her leg improve when lying down-low back and hip pain persist in any position.  She complains of a \"tiredness\" in her legs with sustained activity-however she denies overt weakness.  She admits to episodic urinary urgency without alton incontinence.  She denies bowel dysfunction.  She is treated with oxycodone 15 mg every 6 hours.    Patient was last seen on 2020.  At that time a MRI of the lumbar spine demonstrated diffuse degenerative disc disease with prominent central disc protrusion at L4-5 with generous spinal stenosis.  Dr. Bullock recommended L4-5 laminectomy with possible discectomy.  Surgery was delayed after patient fell from a ladder and fractured her pelvis.  Then she was in quarantine due to a Covid exposure. Today she continues to complain of back and left leg pain- same as before. Pain is constant.  She wishes to move forward with surgery.       Past Medical History:   Diagnosis Date   • Alcoholic (CMS/HCC)    • Asthma    • Bradycardia    • Disease of thyroid gland    • Dyspnea    • Hx of sepsis 10/2016   • Ulcer of abdomen wall (CMS/[facility])          Current Outpatient Medications:   •  albuterol (PROVENTIL HFA;VENTOLIN HFA) 108 (90 BASE) MCG/ACT inhaler, Inhale 2 puffs every 4 (four) hours as needed for wheezing., Disp: , Rfl:   •  albuterol (PROVENTIL) (2.5 MG/3ML) 0.083% nebulizer solution, Take 2.5 mg by nebulization As Needed., " Disp: , Rfl:   •  gabapentin (NEURONTIN) 300 MG capsule, Take 600 mg by mouth 3 (Three) Times a Day., Disp: , Rfl:   •  levothyroxine (SYNTHROID, LEVOTHROID) 100 MCG tablet, Take 100 mcg by mouth daily., Disp: , Rfl:   •  oxyCODONE ER (oxyCONTIN) 15 MG tablet extended-release 12 hour, Take 15 mg by mouth Every 12 (Twelve) Hours., Disp: , Rfl:     Past Surgical History:   Procedure Laterality Date   • CARDIAC CATHETERIZATION N/A 2017    Procedure: Left Heart Cath;  Surgeon: Don Clifford MD;  Location: [facility] CATH INVASIVE LOCATION;  Service:    •  SECTION     • CHOLECYSTECTOMY     • FRACTURE SURGERY Left     knee    • FRACTURE SURGERY Left    • KNEE CARTILAGE SURGERY Left    • WRIST SURGERY         Social History     Socioeconomic History   • Marital status: Legally      Spouse name: Not on file   • Number of children: Not on file   • Years of education: Not on file   • Highest education level: Not on file   Tobacco Use   • Smoking status: Current Every Day Smoker     Packs/day: 0.50     Years: 30.00     Pack years: 15.00     Types: Cigarettes   • Smokeless tobacco: Never Used   Vaping Use   • Vaping Use: Every day   Substance and Sexual Activity   • Alcohol use: Not Currently   • Drug use: No   • Sexual activity: Defer         Review of Systems   Constitutional: Negative for activity change, appetite change, chills and fever.   HENT: Negative for facial swelling, hearing loss, tinnitus, trouble swallowing and voice change.    Eyes: Negative for pain and visual disturbance.   Respiratory: Negative for apnea and shortness of breath.    Cardiovascular: Negative for leg swelling.   Gastrointestinal: Negative for constipation, nausea and vomiting.   Genitourinary: Negative for difficulty urinating and dysuria.   Musculoskeletal: Positive for back pain and gait problem. Negative for joint swelling, neck pain and neck stiffness.   Skin: Negative for color change.   Neurological: Positive for  "numbness. Negative for dizziness, facial asymmetry, speech difficulty and weakness.   Psychiatric/Behavioral: Negative for behavioral problems, confusion, dysphoric mood and sleep disturbance. The patient is not nervous/anxious.    All other systems reviewed and are negative.      Objective   Vital Signs: Blood pressure 104/55, temperature 97.8 °F (36.6 °C), height 175.3 cm (69\"), weight 64.9 kg (143 lb).  Physical Exam  Vitals and nursing note reviewed.   Constitutional:       General: She is not in acute distress.     Appearance: She is well-developed.   HENT:      Head: Normocephalic and atraumatic.   Eyes:      Pupils: Pupils are equal, round, and reactive to light.   Cardiovascular:      Heart sounds: Normal heart sounds.   Pulmonary:      Breath sounds: Normal breath sounds.   Psychiatric:         Behavior: Behavior normal.         Thought Content: Thought content normal.     Musculoskeletal:     Strength is intact in upper and lower extremities to direct testing.     Gait it antalgic      Straight leg raise is positive at 15 degrees on the left   Neurologic:     Muscle tone is normal throughout.     Coordination is intact.     Deep tendon reflexes: 2+ and symmetrical.     Sensation is intact to light touch throughout.     Patient is oriented to person, place, and time.         Independent review of radiographic imaging: MRI of the lumbar spine demonstrates multilevel degenerative disc disease.  At L4-5 there is significant broad-based disc protrusion with generous spinal stenosis.      Assessment/Plan   Diagnosis:  1.  Spinal stenosis with neurogenic claudication, L4-5  2.  Lumbar radiculopathy  3.  Chronic mechanical back pain  4.  Tobacco abuse      Medical Decision Making: We previously tried to schedule surgery-patient had to cancel due to other medical issues.  She presents today with ongoing back and left leg complaints with neurogenic claudication.  She continues to smoke heavily. I have discussed the " merits of smoking cessation as well as the risks associated with continuing as it relates to her degenerative process and undergoing surgery.  Once again we have recommended decompressive laminectomy L4-5 with possible discectomy.  I think that this will overall help with her leg symptoms and get her walking better however there is no guarantee that this will eradicate all of her symptoms.  She is very understanding of that.  I have discussed the general nature of the procedure as well as risks, complications, and limitations as well as alternatives to the procedure.  Patient would like to proceed.        Diagnoses and all orders for this visit:    1. Spinal stenosis, lumbar region, with neurogenic claudication (Primary)    2. Facet arthritis of lumbar region    3. Degenerative disc disease, lumbar    4. Mechanical back pain    5. Lumbar radiculopathy    6. Encounter for smoking cessation counseling                        Patient's Body mass index is 21.12 kg/m². BMI is within normal parameters. No follow-up required..     I discussed >3m the need to STOP smoking, there is a direct correlation between smoking and wound healing and degenerative disc disease. Patient will take this under advisement and discuss with their primary provider.        Luna Cates PA-C  Patient Care Team:  Sigifredo Jacques MD as PCP - General (Family Medicine)

## 2021-07-02 NOTE — PROGRESS NOTES
"Patient: Ernesto Stone  : 1970  Chart #: [id number]    Date of Service: 2021    CHIEF COMPLAINT: Low back and left leg pain with walking and standing intolerance    History of Present Illness Ms. Stone is a pleasant 51-year-old disabled woman who previously presented to our service with low back and left lower extremity pain worse with standing and walking.  Pain radiates from the low back down the left leg into the shin and lateral ankle.  Symptoms are worse when walking greater than 30 minutes or when transitioning up and down stairs.  Symptoms in her leg improve when lying down-low back and hip pain persist in any position.  She complains of a \"tiredness\" in her legs with sustained activity-however she denies overt weakness.  She admits to episodic urinary urgency without alton incontinence.  She denies bowel dysfunction.  She is treated with oxycodone 15 mg every 6 hours.    Patient was last seen on 2020.  At that time a MRI of the lumbar spine demonstrated diffuse degenerative disc disease with prominent central disc protrusion at L4-5 with generous spinal stenosis.  Dr. Bullock recommended L4-5 laminectomy with possible discectomy.  Surgery was delayed after patient fell from a ladder and fractured her pelvis.  Then she was in quarantine due to a Covid exposure. Today she continues to complain of back and left leg pain- same as before. Pain is constant.  She wishes to move forward with surgery.       Past Medical History:   Diagnosis Date   • Alcoholic (CMS/HCC)    • Asthma    • Bradycardia    • Disease of thyroid gland    • Dyspnea    • Hx of sepsis 10/2016   • Ulcer of abdomen wall (CMS/[facility])          Current Outpatient Medications:   •  albuterol (PROVENTIL HFA;VENTOLIN HFA) 108 (90 BASE) MCG/ACT inhaler, Inhale 2 puffs every 4 (four) hours as needed for wheezing., Disp: , Rfl:   •  albuterol (PROVENTIL) (2.5 MG/3ML) 0.083% nebulizer solution, Take 2.5 mg by nebulization As Needed., " Disp: , Rfl:   •  gabapentin (NEURONTIN) 300 MG capsule, Take 600 mg by mouth 3 (Three) Times a Day., Disp: , Rfl:   •  levothyroxine (SYNTHROID, LEVOTHROID) 100 MCG tablet, Take 100 mcg by mouth daily., Disp: , Rfl:   •  oxyCODONE ER (oxyCONTIN) 15 MG tablet extended-release 12 hour, Take 15 mg by mouth Every 12 (Twelve) Hours., Disp: , Rfl:     Past Surgical History:   Procedure Laterality Date   • CARDIAC CATHETERIZATION N/A 2017    Procedure: Left Heart Cath;  Surgeon: Don Clifford MD;  Location: [facility] CATH INVASIVE LOCATION;  Service:    •  SECTION     • CHOLECYSTECTOMY     • FRACTURE SURGERY Left     knee    • FRACTURE SURGERY Left    • KNEE CARTILAGE SURGERY Left    • WRIST SURGERY         Social History     Socioeconomic History   • Marital status: Legally      Spouse name: Not on file   • Number of children: Not on file   • Years of education: Not on file   • Highest education level: Not on file   Tobacco Use   • Smoking status: Current Every Day Smoker     Packs/day: 0.50     Years: 30.00     Pack years: 15.00     Types: Cigarettes   • Smokeless tobacco: Never Used   Vaping Use   • Vaping Use: Every day   Substance and Sexual Activity   • Alcohol use: Not Currently   • Drug use: No   • Sexual activity: Defer         Review of Systems   Constitutional: Negative for activity change, appetite change, chills and fever.   HENT: Negative for facial swelling, hearing loss, tinnitus, trouble swallowing and voice change.    Eyes: Negative for pain and visual disturbance.   Respiratory: Negative for apnea and shortness of breath.    Cardiovascular: Negative for leg swelling.   Gastrointestinal: Negative for constipation, nausea and vomiting.   Genitourinary: Negative for difficulty urinating and dysuria.   Musculoskeletal: Positive for back pain and gait problem. Negative for joint swelling, neck pain and neck stiffness.   Skin: Negative for color change.   Neurological: Positive for  "numbness. Negative for dizziness, facial asymmetry, speech difficulty and weakness.   Psychiatric/Behavioral: Negative for behavioral problems, confusion, dysphoric mood and sleep disturbance. The patient is not nervous/anxious.    All other systems reviewed and are negative.      Objective   Vital Signs: Blood pressure 104/55, temperature 97.8 °F (36.6 °C), height 175.3 cm (69\"), weight 64.9 kg (143 lb).  Physical Exam  Vitals and nursing note reviewed.   Constitutional:       General: She is not in acute distress.     Appearance: She is well-developed.   HENT:      Head: Normocephalic and atraumatic.   Eyes:      Pupils: Pupils are equal, round, and reactive to light.   Cardiovascular:      Heart sounds: Normal heart sounds.   Pulmonary:      Breath sounds: Normal breath sounds.   Psychiatric:         Behavior: Behavior normal.         Thought Content: Thought content normal.     Musculoskeletal:     Strength is intact in upper and lower extremities to direct testing.     Gait it antalgic      Straight leg raise is positive at 15 degrees on the left   Neurologic:     Muscle tone is normal throughout.     Coordination is intact.     Deep tendon reflexes: 2+ and symmetrical.     Sensation is intact to light touch throughout.     Patient is oriented to person, place, and time.         Independent review of radiographic imaging: MRI of the lumbar spine demonstrates multilevel degenerative disc disease.  At L4-5 there is significant broad-based disc protrusion with generous spinal stenosis.      Assessment/Plan   Diagnosis:  1.  Spinal stenosis with neurogenic claudication, L4-5  2.  Lumbar radiculopathy  3.  Chronic mechanical back pain  4.  Tobacco abuse      Medical Decision Making: We previously tried to schedule surgery-patient had to cancel due to other medical issues.  She presents today with ongoing back and left leg complaints with neurogenic claudication.  She continues to smoke heavily. I have discussed the " merits of smoking cessation as well as the risks associated with continuing as it relates to her degenerative process and undergoing surgery.  Once again we have recommended decompressive laminectomy L4-5 with possible discectomy.  I think that this will overall help with her leg symptoms and get her walking better however there is no guarantee that this will eradicate all of her symptoms.  She is very understanding of that.  I have discussed the general nature of the procedure as well as risks, complications, and limitations as well as alternatives to the procedure.  Patient would like to proceed.        Diagnoses and all orders for this visit:    1. Spinal stenosis, lumbar region, with neurogenic claudication (Primary)    2. Facet arthritis of lumbar region    3. Degenerative disc disease, lumbar    4. Mechanical back pain    5. Lumbar radiculopathy    6. Encounter for smoking cessation counseling                        Patient's Body mass index is 21.12 kg/m². BMI is within normal parameters. No follow-up required..     I discussed >3m the need to STOP smoking, there is a direct correlation between smoking and wound healing and degenerative disc disease. Patient will take this under advisement and discuss with their primary provider.        Luna Cates PA-C  Patient Care Team:  Sigifredo Jacques MD as PCP - General (Family Medicine)

## 2021-07-07 RX ORDER — CHLORHEXIDINE GLUCONATE 4 G/100ML
SOLUTION TOPICAL DAILY
Qty: 120 ML | Refills: 0 | Status: ON HOLD | OUTPATIENT
Start: 2021-07-07 | End: 2021-08-02

## 2021-07-14 ENCOUNTER — HOSPITAL ENCOUNTER (OUTPATIENT)
Dept: GENERAL RADIOLOGY | Facility: HOSPITAL | Age: 51
Discharge: HOME | End: 2021-07-14
Admitting: FAMILY MEDICINE

## 2021-07-14 ENCOUNTER — TRANSCRIBE ORDERS (OUTPATIENT)
Dept: ADMINISTRATIVE | Facility: HOSPITAL | Age: 51
End: 2021-07-14

## 2021-07-14 DIAGNOSIS — M79.644 PAIN IN FINGER OF RIGHT HAND: ICD-10-CM

## 2021-07-14 DIAGNOSIS — M79.642 LEFT HAND PAIN: ICD-10-CM

## 2021-07-14 DIAGNOSIS — M25.532 LEFT WRIST PAIN: ICD-10-CM

## 2021-07-14 DIAGNOSIS — M79.641 RIGHT HAND PAIN: Primary | ICD-10-CM

## 2021-07-14 DIAGNOSIS — M79.641 RIGHT HAND PAIN: ICD-10-CM

## 2021-07-14 PROCEDURE — 73110 X-RAY EXAM OF WRIST: CPT | Performed by: RADIOLOGY

## 2021-07-14 PROCEDURE — 73110 X-RAY EXAM OF WRIST: CPT

## 2021-07-14 PROCEDURE — 73130 X-RAY EXAM OF HAND: CPT

## 2021-07-14 PROCEDURE — 73130 X-RAY EXAM OF HAND: CPT | Performed by: RADIOLOGY

## 2021-07-30 ENCOUNTER — PRE-ADMISSION TESTING (OUTPATIENT)
Dept: PREADMISSION TESTING | Facility: HOSPITAL | Age: 51
End: 2021-07-30

## 2021-07-30 VITALS — HEIGHT: 69 IN | WEIGHT: 147.49 LBS | BODY MASS INDEX: 21.84 KG/M2

## 2021-07-30 DIAGNOSIS — M48.062 SPINAL STENOSIS, LUMBAR REGION, WITH NEUROGENIC CLAUDICATION: ICD-10-CM

## 2021-07-30 LAB
ALBUMIN SERPL-MCNC: 4.4 G/DL (ref 3.5–5.2)
ALBUMIN/GLOB SERPL: 1.8 G/DL
ALP SERPL-CCNC: 109 U/L (ref 39–117)
ALT SERPL W P-5'-P-CCNC: 38 U/L (ref 1–33)
ANION GAP SERPL CALCULATED.3IONS-SCNC: 6 MMOL/L (ref 5–15)
AST SERPL-CCNC: 33 U/L (ref 1–32)
BASOPHILS # BLD AUTO: 0.06 10*3/MM3 (ref 0–0.2)
BASOPHILS NFR BLD AUTO: 1 % (ref 0–1.5)
BILIRUB SERPL-MCNC: 0.3 MG/DL (ref 0–1.2)
BUN SERPL-MCNC: 15 MG/DL (ref 6–20)
BUN/CREAT SERPL: 15.5 (ref 7–25)
CALCIUM SPEC-SCNC: 9.8 MG/DL (ref 8.6–10.5)
CHLORIDE SERPL-SCNC: 103 MMOL/L (ref 98–107)
CO2 SERPL-SCNC: 31 MMOL/L (ref 22–29)
CREAT SERPL-MCNC: 0.97 MG/DL (ref 0.57–1)
DEPRECATED RDW RBC AUTO: 47.2 FL (ref 37–54)
EOSINOPHIL # BLD AUTO: 0.41 10*3/MM3 (ref 0–0.4)
EOSINOPHIL NFR BLD AUTO: 6.8 % (ref 0.3–6.2)
ERYTHROCYTE [DISTWIDTH] IN BLOOD BY AUTOMATED COUNT: 13.3 % (ref 12.3–15.4)
GFR SERPL CREATININE-BSD FRML MDRD: 61 ML/MIN/1.73
GLOBULIN UR ELPH-MCNC: 2.5 GM/DL
GLUCOSE SERPL-MCNC: 92 MG/DL (ref 65–99)
HCT VFR BLD AUTO: 44.1 % (ref 34–46.6)
HGB BLD-MCNC: 14.3 G/DL (ref 12–15.9)
IMM GRANULOCYTES # BLD AUTO: 0.01 10*3/MM3 (ref 0–0.05)
IMM GRANULOCYTES NFR BLD AUTO: 0.2 % (ref 0–0.5)
LYMPHOCYTES # BLD AUTO: 2.48 10*3/MM3 (ref 0.7–3.1)
LYMPHOCYTES NFR BLD AUTO: 41.3 % (ref 19.6–45.3)
MCH RBC QN AUTO: 31 PG (ref 26.6–33)
MCHC RBC AUTO-ENTMCNC: 32.4 G/DL (ref 31.5–35.7)
MCV RBC AUTO: 95.7 FL (ref 79–97)
MONOCYTES # BLD AUTO: 0.44 10*3/MM3 (ref 0.1–0.9)
MONOCYTES NFR BLD AUTO: 7.3 % (ref 5–12)
MRSA DNA SPEC QL NAA+PROBE: NEGATIVE
NEUTROPHILS NFR BLD AUTO: 2.6 10*3/MM3 (ref 1.7–7)
NEUTROPHILS NFR BLD AUTO: 43.4 % (ref 42.7–76)
NRBC BLD AUTO-RTO: 0 /100 WBC (ref 0–0.2)
PLATELET # BLD AUTO: 253 10*3/MM3 (ref 140–450)
PMV BLD AUTO: 9.9 FL (ref 6–12)
POTASSIUM SERPL-SCNC: 4.8 MMOL/L (ref 3.5–5.2)
PROT SERPL-MCNC: 6.9 G/DL (ref 6–8.5)
QT INTERVAL: 462 MS
QTC INTERVAL: 390 MS
RBC # BLD AUTO: 4.61 10*6/MM3 (ref 3.77–5.28)
SARS-COV-2 RNA PNL SPEC NAA+PROBE: NOT DETECTED
SODIUM SERPL-SCNC: 140 MMOL/L (ref 136–145)
WBC # BLD AUTO: 6 10*3/MM3 (ref 3.4–10.8)

## 2021-07-30 PROCEDURE — C9803 HOPD COVID-19 SPEC COLLECT: HCPCS

## 2021-07-30 PROCEDURE — 93010 ELECTROCARDIOGRAM REPORT: CPT | Performed by: INTERNAL MEDICINE

## 2021-07-30 PROCEDURE — 93005 ELECTROCARDIOGRAM TRACING: CPT

## 2021-07-30 PROCEDURE — 80053 COMPREHEN METABOLIC PANEL: CPT

## 2021-07-30 PROCEDURE — 85025 COMPLETE CBC W/AUTO DIFF WBC: CPT

## 2021-07-30 PROCEDURE — 36415 COLL VENOUS BLD VENIPUNCTURE: CPT

## 2021-07-30 PROCEDURE — U0004 COV-19 TEST NON-CDC HGH THRU: HCPCS

## 2021-07-30 PROCEDURE — 87641 MR-STAPH DNA AMP PROBE: CPT

## 2021-07-30 RX ORDER — GABAPENTIN 800 MG/1
800 TABLET ORAL 3 TIMES DAILY
COMMUNITY
End: 2021-11-09

## 2021-08-01 ENCOUNTER — ANESTHESIA EVENT (OUTPATIENT)
Dept: PERIOP | Facility: HOSPITAL | Age: 51
End: 2021-08-01

## 2021-08-01 RX ORDER — FAMOTIDINE 20 MG/1
20 TABLET, FILM COATED ORAL ONCE
Status: CANCELLED | OUTPATIENT
Start: 2021-08-01 | End: 2021-08-01

## 2021-08-01 RX ORDER — FAMOTIDINE 10 MG/ML
20 INJECTION, SOLUTION INTRAVENOUS ONCE
Status: CANCELLED | OUTPATIENT
Start: 2021-08-01 | End: 2021-08-01

## 2021-08-02 ENCOUNTER — HOSPITAL ENCOUNTER (OUTPATIENT)
Facility: HOSPITAL | Age: 51
Discharge: HOME | End: 2021-08-03
Attending: NEUROLOGICAL SURGERY | Admitting: NEUROLOGICAL SURGERY

## 2021-08-02 ENCOUNTER — ANESTHESIA (OUTPATIENT)
Dept: PERIOP | Facility: HOSPITAL | Age: 51
End: 2021-08-02

## 2021-08-02 ENCOUNTER — APPOINTMENT (OUTPATIENT)
Dept: GENERAL RADIOLOGY | Facility: HOSPITAL | Age: 51
End: 2021-08-02

## 2021-08-02 DIAGNOSIS — M48.062 SPINAL STENOSIS, LUMBAR REGION, WITH NEUROGENIC CLAUDICATION: ICD-10-CM

## 2021-08-02 LAB
B-HCG UR QL: NEGATIVE
INTERNAL NEGATIVE CONTROL: NORMAL
INTERNAL POSITIVE CONTROL: NORMAL
Lab: NORMAL

## 2021-08-02 PROCEDURE — 25010000002 MORPHINE PER 10 MG: Performed by: NEUROLOGICAL SURGERY

## 2021-08-02 PROCEDURE — 25010000002 FENTANYL CITRATE (PF) 50 MCG/ML SOLUTION: Performed by: NURSE ANESTHETIST, CERTIFIED REGISTERED

## 2021-08-02 PROCEDURE — 25010000002 CEFAZOLIN PER 500 MG: Performed by: NEUROLOGICAL SURGERY

## 2021-08-02 PROCEDURE — 25010000002 ONDANSETRON PER 1 MG: Performed by: NURSE ANESTHETIST, CERTIFIED REGISTERED

## 2021-08-02 PROCEDURE — 94640 AIRWAY INHALATION TREATMENT: CPT

## 2021-08-02 PROCEDURE — C1889 IMPLANT/INSERT DEVICE, NOC: HCPCS | Performed by: NEUROLOGICAL SURGERY

## 2021-08-02 PROCEDURE — 25010000002 PROPOFOL 10 MG/ML EMULSION: Performed by: NURSE ANESTHETIST, CERTIFIED REGISTERED

## 2021-08-02 PROCEDURE — 25010000002 DEXAMETHASONE PER 1 MG: Performed by: NURSE ANESTHETIST, CERTIFIED REGISTERED

## 2021-08-02 PROCEDURE — 81025 URINE PREGNANCY TEST: CPT | Performed by: ANESTHESIOLOGY

## 2021-08-02 PROCEDURE — 25010000003 CEFAZOLIN IN DEXTROSE 2-4 GM/100ML-% SOLUTION: Performed by: PHYSICIAN ASSISTANT

## 2021-08-02 PROCEDURE — 25010000002 NEOSTIGMINE 10 MG/10ML SOLUTION: Performed by: NURSE ANESTHETIST, CERTIFIED REGISTERED

## 2021-08-02 PROCEDURE — 63047 LAM FACETEC & FORAMOT LUMBAR: CPT | Performed by: PHYSICIAN ASSISTANT

## 2021-08-02 PROCEDURE — 63047 LAM FACETEC & FORAMOT LUMBAR: CPT | Performed by: NEUROLOGICAL SURGERY

## 2021-08-02 PROCEDURE — 25010000002 HYDROMORPHONE PER 4 MG: Performed by: NURSE ANESTHETIST, CERTIFIED REGISTERED

## 2021-08-02 PROCEDURE — 76000 FLUOROSCOPY <1 HR PHYS/QHP: CPT

## 2021-08-02 DEVICE — FLOSEAL HEMOSTATIC MATRIX, 10ML
Type: IMPLANTABLE DEVICE | Site: SPINE LUMBAR | Status: FUNCTIONAL
Brand: FLOSEAL HEMOSTATIC MATRIX

## 2021-08-02 DEVICE — HEMOST ABS SURGIFOAM SZ100 8X12 10MM: Type: IMPLANTABLE DEVICE | Site: SPINE LUMBAR | Status: FUNCTIONAL

## 2021-08-02 RX ORDER — BISACODYL 10 MG
10 SUPPOSITORY, RECTAL RECTAL DAILY PRN
Status: DISCONTINUED | OUTPATIENT
Start: 2021-08-02 | End: 2021-08-03 | Stop reason: HOSPADM

## 2021-08-02 RX ORDER — ONDANSETRON 2 MG/ML
4 INJECTION INTRAMUSCULAR; INTRAVENOUS ONCE AS NEEDED
Status: DISCONTINUED | OUTPATIENT
Start: 2021-08-02 | End: 2021-08-02 | Stop reason: HOSPADM

## 2021-08-02 RX ORDER — MAGNESIUM HYDROXIDE 1200 MG/15ML
LIQUID ORAL AS NEEDED
Status: DISCONTINUED | OUTPATIENT
Start: 2021-08-02 | End: 2021-08-02 | Stop reason: HOSPADM

## 2021-08-02 RX ORDER — ALBUTEROL SULFATE 2.5 MG/3ML
2.5 SOLUTION RESPIRATORY (INHALATION) EVERY 4 HOURS PRN
Status: DISCONTINUED | OUTPATIENT
Start: 2021-08-02 | End: 2021-08-03 | Stop reason: HOSPADM

## 2021-08-02 RX ORDER — HYDROMORPHONE HYDROCHLORIDE 1 MG/ML
0.5 INJECTION, SOLUTION INTRAMUSCULAR; INTRAVENOUS; SUBCUTANEOUS
Status: DISCONTINUED | OUTPATIENT
Start: 2021-08-02 | End: 2021-08-02 | Stop reason: HOSPADM

## 2021-08-02 RX ORDER — MORPHINE SULFATE 2 MG/ML
2 INJECTION, SOLUTION INTRAMUSCULAR; INTRAVENOUS EVERY 4 HOURS PRN
Status: DISCONTINUED | OUTPATIENT
Start: 2021-08-02 | End: 2021-08-03 | Stop reason: HOSPADM

## 2021-08-02 RX ORDER — CEFAZOLIN SODIUM 2 G/100ML
2 INJECTION, SOLUTION INTRAVENOUS EVERY 8 HOURS
Status: COMPLETED | OUTPATIENT
Start: 2021-08-02 | End: 2021-08-03

## 2021-08-02 RX ORDER — ONDANSETRON 2 MG/ML
INJECTION INTRAMUSCULAR; INTRAVENOUS AS NEEDED
Status: DISCONTINUED | OUTPATIENT
Start: 2021-08-02 | End: 2021-08-02 | Stop reason: SURG

## 2021-08-02 RX ORDER — SODIUM CHLORIDE 0.9 % (FLUSH) 0.9 %
3 SYRINGE (ML) INJECTION EVERY 12 HOURS SCHEDULED
Status: DISCONTINUED | OUTPATIENT
Start: 2021-08-02 | End: 2021-08-03 | Stop reason: HOSPADM

## 2021-08-02 RX ORDER — PROMETHAZINE HYDROCHLORIDE 12.5 MG/1
12.5 SUPPOSITORY RECTAL EVERY 6 HOURS PRN
Status: DISCONTINUED | OUTPATIENT
Start: 2021-08-02 | End: 2021-08-03 | Stop reason: HOSPADM

## 2021-08-02 RX ORDER — FENTANYL CITRATE 50 UG/ML
50 INJECTION, SOLUTION INTRAMUSCULAR; INTRAVENOUS
Status: DISCONTINUED | OUTPATIENT
Start: 2021-08-02 | End: 2021-08-02 | Stop reason: HOSPADM

## 2021-08-02 RX ORDER — IBUPROFEN 600 MG/1
600 TABLET ORAL EVERY 8 HOURS
Status: DISCONTINUED | OUTPATIENT
Start: 2021-08-02 | End: 2021-08-03 | Stop reason: HOSPADM

## 2021-08-02 RX ORDER — SODIUM CHLORIDE, SODIUM LACTATE, POTASSIUM CHLORIDE, CALCIUM CHLORIDE 600; 310; 30; 20 MG/100ML; MG/100ML; MG/100ML; MG/100ML
100 INJECTION, SOLUTION INTRAVENOUS CONTINUOUS
Status: DISCONTINUED | OUTPATIENT
Start: 2021-08-02 | End: 2021-08-03

## 2021-08-02 RX ORDER — GABAPENTIN 400 MG/1
800 CAPSULE ORAL EVERY 8 HOURS SCHEDULED
Status: DISCONTINUED | OUTPATIENT
Start: 2021-08-02 | End: 2021-08-03 | Stop reason: HOSPADM

## 2021-08-02 RX ORDER — MIDAZOLAM HYDROCHLORIDE 1 MG/ML
1 INJECTION INTRAMUSCULAR; INTRAVENOUS
Status: DISCONTINUED | OUTPATIENT
Start: 2021-08-02 | End: 2021-08-02 | Stop reason: HOSPADM

## 2021-08-02 RX ORDER — FAMOTIDINE 20 MG/1
20 TABLET, FILM COATED ORAL
Status: COMPLETED | OUTPATIENT
Start: 2021-08-02 | End: 2021-08-02

## 2021-08-02 RX ORDER — FENTANYL CITRATE 50 UG/ML
INJECTION, SOLUTION INTRAMUSCULAR; INTRAVENOUS AS NEEDED
Status: DISCONTINUED | OUTPATIENT
Start: 2021-08-02 | End: 2021-08-02 | Stop reason: SURG

## 2021-08-02 RX ORDER — ACETAMINOPHEN 325 MG/1
650 TABLET ORAL EVERY 4 HOURS PRN
Status: DISCONTINUED | OUTPATIENT
Start: 2021-08-02 | End: 2021-08-03 | Stop reason: HOSPADM

## 2021-08-02 RX ORDER — EPHEDRINE SULFATE 50 MG/ML
5 INJECTION, SOLUTION INTRAVENOUS ONCE AS NEEDED
Status: DISCONTINUED | OUTPATIENT
Start: 2021-08-02 | End: 2021-08-02 | Stop reason: HOSPADM

## 2021-08-02 RX ORDER — SODIUM CHLORIDE, SODIUM LACTATE, POTASSIUM CHLORIDE, CALCIUM CHLORIDE 600; 310; 30; 20 MG/100ML; MG/100ML; MG/100ML; MG/100ML
9 INJECTION, SOLUTION INTRAVENOUS CONTINUOUS
Status: DISCONTINUED | OUTPATIENT
Start: 2021-08-02 | End: 2021-08-03 | Stop reason: HOSPADM

## 2021-08-02 RX ORDER — SODIUM CHLORIDE 0.9 % (FLUSH) 0.9 %
10 SYRINGE (ML) INJECTION AS NEEDED
Status: DISCONTINUED | OUTPATIENT
Start: 2021-08-02 | End: 2021-08-02 | Stop reason: HOSPADM

## 2021-08-02 RX ORDER — EPHEDRINE SULFATE 50 MG/ML
INJECTION, SOLUTION INTRAVENOUS AS NEEDED
Status: DISCONTINUED | OUTPATIENT
Start: 2021-08-02 | End: 2021-08-02 | Stop reason: SURG

## 2021-08-02 RX ORDER — ALBUTEROL SULFATE 2.5 MG/3ML
2.5 SOLUTION RESPIRATORY (INHALATION) AS NEEDED
Status: DISCONTINUED | OUTPATIENT
Start: 2021-08-02 | End: 2021-08-02 | Stop reason: SDUPTHER

## 2021-08-02 RX ORDER — MEPERIDINE HYDROCHLORIDE 25 MG/ML
12.5 INJECTION INTRAMUSCULAR; INTRAVENOUS; SUBCUTANEOUS
Status: DISCONTINUED | OUTPATIENT
Start: 2021-08-02 | End: 2021-08-02 | Stop reason: HOSPADM

## 2021-08-02 RX ORDER — SODIUM CHLORIDE 9 MG/ML
100 INJECTION, SOLUTION INTRAVENOUS CONTINUOUS
Status: DISCONTINUED | OUTPATIENT
Start: 2021-08-02 | End: 2021-08-03

## 2021-08-02 RX ORDER — ONDANSETRON 4 MG/1
4 TABLET, FILM COATED ORAL EVERY 6 HOURS PRN
Status: DISCONTINUED | OUTPATIENT
Start: 2021-08-02 | End: 2021-08-03 | Stop reason: HOSPADM

## 2021-08-02 RX ORDER — SODIUM CHLORIDE 0.9 % (FLUSH) 0.9 %
10 SYRINGE (ML) INJECTION EVERY 12 HOURS SCHEDULED
Status: DISCONTINUED | OUTPATIENT
Start: 2021-08-02 | End: 2021-08-02 | Stop reason: HOSPADM

## 2021-08-02 RX ORDER — PROMETHAZINE HYDROCHLORIDE 12.5 MG/1
12.5 TABLET ORAL EVERY 6 HOURS PRN
Status: DISCONTINUED | OUTPATIENT
Start: 2021-08-02 | End: 2021-08-03 | Stop reason: HOSPADM

## 2021-08-02 RX ORDER — LIDOCAINE HYDROCHLORIDE 10 MG/ML
INJECTION, SOLUTION EPIDURAL; INFILTRATION; INTRACAUDAL; PERINEURAL AS NEEDED
Status: DISCONTINUED | OUTPATIENT
Start: 2021-08-02 | End: 2021-08-02 | Stop reason: SURG

## 2021-08-02 RX ORDER — MORPHINE SULFATE 4 MG/ML
5 INJECTION, SOLUTION INTRAMUSCULAR; INTRAVENOUS EVERY 4 HOURS PRN
Status: DISCONTINUED | OUTPATIENT
Start: 2021-08-02 | End: 2021-08-03 | Stop reason: HOSPADM

## 2021-08-02 RX ORDER — NALOXONE HCL 0.4 MG/ML
0.4 VIAL (ML) INJECTION
Status: DISCONTINUED | OUTPATIENT
Start: 2021-08-02 | End: 2021-08-03 | Stop reason: HOSPADM

## 2021-08-02 RX ORDER — GLYCOPYRROLATE 0.2 MG/ML
INJECTION INTRAMUSCULAR; INTRAVENOUS AS NEEDED
Status: DISCONTINUED | OUTPATIENT
Start: 2021-08-02 | End: 2021-08-02 | Stop reason: SURG

## 2021-08-02 RX ORDER — SODIUM CHLORIDE, SODIUM LACTATE, POTASSIUM CHLORIDE, CALCIUM CHLORIDE 600; 310; 30; 20 MG/100ML; MG/100ML; MG/100ML; MG/100ML
50 INJECTION, SOLUTION INTRAVENOUS CONTINUOUS
Status: DISCONTINUED | OUTPATIENT
Start: 2021-08-02 | End: 2021-08-03

## 2021-08-02 RX ORDER — ROCURONIUM BROMIDE 10 MG/ML
INJECTION, SOLUTION INTRAVENOUS AS NEEDED
Status: DISCONTINUED | OUTPATIENT
Start: 2021-08-02 | End: 2021-08-02 | Stop reason: SURG

## 2021-08-02 RX ORDER — NICOTINE 21 MG/24HR
1 PATCH, TRANSDERMAL 24 HOURS TRANSDERMAL EVERY 24 HOURS
Status: DISCONTINUED | OUTPATIENT
Start: 2021-08-03 | End: 2021-08-03 | Stop reason: HOSPADM

## 2021-08-02 RX ORDER — PROPOFOL 10 MG/ML
VIAL (ML) INTRAVENOUS AS NEEDED
Status: DISCONTINUED | OUTPATIENT
Start: 2021-08-02 | End: 2021-08-02 | Stop reason: SURG

## 2021-08-02 RX ORDER — AMOXICILLIN 250 MG
2 CAPSULE ORAL NIGHTLY PRN
Status: DISCONTINUED | OUTPATIENT
Start: 2021-08-02 | End: 2021-08-03 | Stop reason: HOSPADM

## 2021-08-02 RX ORDER — ONDANSETRON 2 MG/ML
4 INJECTION INTRAMUSCULAR; INTRAVENOUS EVERY 6 HOURS PRN
Status: DISCONTINUED | OUTPATIENT
Start: 2021-08-02 | End: 2021-08-03 | Stop reason: HOSPADM

## 2021-08-02 RX ORDER — DEXAMETHASONE SODIUM PHOSPHATE 4 MG/ML
INJECTION, SOLUTION INTRA-ARTICULAR; INTRALESIONAL; INTRAMUSCULAR; INTRAVENOUS; SOFT TISSUE AS NEEDED
Status: DISCONTINUED | OUTPATIENT
Start: 2021-08-02 | End: 2021-08-02 | Stop reason: SURG

## 2021-08-02 RX ORDER — OXYCODONE HYDROCHLORIDE 15 MG/1
15 TABLET ORAL EVERY 6 HOURS
COMMUNITY
End: 2021-11-09

## 2021-08-02 RX ORDER — LIDOCAINE HYDROCHLORIDE 10 MG/ML
0.5 INJECTION, SOLUTION EPIDURAL; INFILTRATION; INTRACAUDAL; PERINEURAL ONCE AS NEEDED
Status: COMPLETED | OUTPATIENT
Start: 2021-08-02 | End: 2021-08-02

## 2021-08-02 RX ORDER — SODIUM CHLORIDE 0.9 % (FLUSH) 0.9 %
10 SYRINGE (ML) INJECTION AS NEEDED
Status: DISCONTINUED | OUTPATIENT
Start: 2021-08-02 | End: 2021-08-03 | Stop reason: HOSPADM

## 2021-08-02 RX ORDER — OXYCODONE AND ACETAMINOPHEN 7.5; 325 MG/1; MG/1
1 TABLET ORAL EVERY 4 HOURS PRN
Status: DISCONTINUED | OUTPATIENT
Start: 2021-08-02 | End: 2021-08-03 | Stop reason: HOSPADM

## 2021-08-02 RX ORDER — OXYCODONE HYDROCHLORIDE 15 MG/1
15 TABLET ORAL EVERY 6 HOURS
Status: DISCONTINUED | OUTPATIENT
Start: 2021-08-02 | End: 2021-08-03 | Stop reason: HOSPADM

## 2021-08-02 RX ORDER — NALOXONE HCL 0.4 MG/ML
0.4 VIAL (ML) INJECTION AS NEEDED
Status: DISCONTINUED | OUTPATIENT
Start: 2021-08-02 | End: 2021-08-02 | Stop reason: HOSPADM

## 2021-08-02 RX ORDER — OXYCODONE HYDROCHLORIDE 15 MG/1
15 TABLET, FILM COATED, EXTENDED RELEASE ORAL EVERY 8 HOURS SCHEDULED
Status: DISCONTINUED | OUTPATIENT
Start: 2021-08-02 | End: 2021-08-02

## 2021-08-02 RX ORDER — BUPIVACAINE HYDROCHLORIDE AND EPINEPHRINE 2.5; 5 MG/ML; UG/ML
INJECTION, SOLUTION EPIDURAL; INFILTRATION; INTRACAUDAL; PERINEURAL AS NEEDED
Status: DISCONTINUED | OUTPATIENT
Start: 2021-08-02 | End: 2021-08-02 | Stop reason: HOSPADM

## 2021-08-02 RX ORDER — NEOSTIGMINE METHYLSULFATE 1 MG/ML
INJECTION, SOLUTION INTRAVENOUS AS NEEDED
Status: DISCONTINUED | OUTPATIENT
Start: 2021-08-02 | End: 2021-08-02 | Stop reason: SURG

## 2021-08-02 RX ORDER — CEFAZOLIN SODIUM 2 G/100ML
2 INJECTION, SOLUTION INTRAVENOUS ONCE
Status: COMPLETED | OUTPATIENT
Start: 2021-08-02 | End: 2021-08-02

## 2021-08-02 RX ADMIN — PROPOFOL 150 MG: 10 INJECTION, EMULSION INTRAVENOUS at 11:10

## 2021-08-02 RX ADMIN — ONDANSETRON 4 MG: 2 INJECTION INTRAMUSCULAR; INTRAVENOUS at 11:54

## 2021-08-02 RX ADMIN — ALBUTEROL SULFATE 2.5 MG: 2.5 SOLUTION RESPIRATORY (INHALATION) at 20:46

## 2021-08-02 RX ADMIN — HYDROMORPHONE HYDROCHLORIDE 0.5 MG: 1 INJECTION, SOLUTION INTRAMUSCULAR; INTRAVENOUS; SUBCUTANEOUS at 12:35

## 2021-08-02 RX ADMIN — FAMOTIDINE 20 MG: 20 TABLET ORAL at 10:58

## 2021-08-02 RX ADMIN — HYDROMORPHONE HYDROCHLORIDE 0.5 MG: 1 INJECTION, SOLUTION INTRAMUSCULAR; INTRAVENOUS; SUBCUTANEOUS at 12:45

## 2021-08-02 RX ADMIN — CEFAZOLIN 2 G: 10 INJECTION, POWDER, FOR SOLUTION INTRAVENOUS at 19:57

## 2021-08-02 RX ADMIN — EPHEDRINE SULFATE 10 MG: 50 INJECTION INTRAVENOUS at 11:39

## 2021-08-02 RX ADMIN — FENTANYL CITRATE 100 MCG: 50 INJECTION, SOLUTION INTRAMUSCULAR; INTRAVENOUS at 11:10

## 2021-08-02 RX ADMIN — CEFAZOLIN SODIUM 2 G: 10 INJECTION, POWDER, FOR SOLUTION INTRAVENOUS at 11:07

## 2021-08-02 RX ADMIN — GABAPENTIN 800 MG: 400 CAPSULE ORAL at 15:34

## 2021-08-02 RX ADMIN — SODIUM CHLORIDE, POTASSIUM CHLORIDE, SODIUM LACTATE AND CALCIUM CHLORIDE 50 ML/HR: 600; 310; 30; 20 INJECTION, SOLUTION INTRAVENOUS at 15:34

## 2021-08-02 RX ADMIN — LIDOCAINE HYDROCHLORIDE 50 MG: 10 INJECTION, SOLUTION EPIDURAL; INFILTRATION; INTRACAUDAL; PERINEURAL at 11:10

## 2021-08-02 RX ADMIN — DEXAMETHASONE SODIUM PHOSPHATE 8 MG: 4 INJECTION, SOLUTION INTRA-ARTICULAR; INTRALESIONAL; INTRAMUSCULAR; INTRAVENOUS; SOFT TISSUE at 11:10

## 2021-08-02 RX ADMIN — FENTANYL CITRATE 50 MCG: 0.05 INJECTION, SOLUTION INTRAMUSCULAR; INTRAVENOUS at 12:30

## 2021-08-02 RX ADMIN — NEOSTIGMINE 3 MG: 1 INJECTION INTRAVENOUS at 12:06

## 2021-08-02 RX ADMIN — LIDOCAINE HYDROCHLORIDE 0.5 ML: 10 INJECTION, SOLUTION EPIDURAL; INFILTRATION; INTRACAUDAL; PERINEURAL at 10:58

## 2021-08-02 RX ADMIN — ROCURONIUM BROMIDE 40 MG: 10 INJECTION, SOLUTION INTRAVENOUS at 11:10

## 2021-08-02 RX ADMIN — GLYCOPYRROLATE 0.4 MG: 0.4 INJECTION INTRAMUSCULAR; INTRAVENOUS at 12:06

## 2021-08-02 RX ADMIN — HYDROMORPHONE HYDROCHLORIDE 0.5 MG: 1 INJECTION, SOLUTION INTRAMUSCULAR; INTRAVENOUS; SUBCUTANEOUS at 13:00

## 2021-08-02 RX ADMIN — SODIUM CHLORIDE, POTASSIUM CHLORIDE, SODIUM LACTATE AND CALCIUM CHLORIDE: 600; 310; 30; 20 INJECTION, SOLUTION INTRAVENOUS at 11:52

## 2021-08-02 RX ADMIN — OXYCODONE HYDROCHLORIDE 15 MG: 15 TABLET ORAL at 22:18

## 2021-08-02 RX ADMIN — GABAPENTIN 800 MG: 400 CAPSULE ORAL at 22:18

## 2021-08-02 RX ADMIN — OXYCODONE HYDROCHLORIDE 15 MG: 15 TABLET ORAL at 15:34

## 2021-08-02 RX ADMIN — IBUPROFEN 600 MG: 600 TABLET, FILM COATED ORAL at 15:34

## 2021-08-02 RX ADMIN — SODIUM CHLORIDE, POTASSIUM CHLORIDE, SODIUM LACTATE AND CALCIUM CHLORIDE 9 ML/HR: 600; 310; 30; 20 INJECTION, SOLUTION INTRAVENOUS at 10:58

## 2021-08-02 RX ADMIN — FENTANYL CITRATE 50 MCG: 0.05 INJECTION, SOLUTION INTRAMUSCULAR; INTRAVENOUS at 12:20

## 2021-08-02 RX ADMIN — OXYCODONE HYDROCHLORIDE AND ACETAMINOPHEN 1 TABLET: 7.5; 325 TABLET ORAL at 20:06

## 2021-08-02 RX ADMIN — ACETAMINOPHEN 650 MG: 325 TABLET ORAL at 20:27

## 2021-08-02 RX ADMIN — MORPHINE SULFATE 2 MG: 2 INJECTION, SOLUTION INTRAMUSCULAR; INTRAVENOUS at 21:15

## 2021-08-02 NOTE — OP NOTE
NEUROSURGICAL OPERATIVE NOTE        PREOPERATIVE DIAGNOSIS:    Lumbar stenosis with neurogenic claudication  Lumbar radiculopathy      POSTOPERATIVE DIAGNOSIS:  Same      PROCEDURE:  L4-5 laminectomy with medial facetectomies and foraminotomies      SURGEON:  John Bullock M.D.      ASSISTANT: Hayley Robison PA-C    PAC assisted with:   Suctioning   Retraction   Tying   Suturing   Closing   Application of dressing   Skilled neurosurgery PA assistance was necessary to perform this procedure.        ANESTHESIA:  General      ESTIMATED BLOOD LOSS: Minimal      SPECIMEN: None      DRAINS: 7 flat JOSE RAMON      COMPLICATIONS:  None      CLINICAL NOTE:  The patient is a 51-year-old woman with progressive back and left leg pain with walking and standing intolerance.  Studies demonstrate broad-based disc bulging as well as a generous grade stenosis at L4-5.  As such, she presents at this time for lumbar decompression.  The nature of the procedure as well as the potential risks, complications, limitations, and alternatives to the procedure were discussed at length with the patient and the patient has agreed to proceed with surgery.      TECHNICAL NOTE:  The patient was brought to the operating room and while on her cart general endotracheal anesthesia was achieved. She was then turned prone onto the Cloward saddle frame and very special care was ensured to protect pressure points. Her low back was prepared and draped in the usual fashion. A localizing radiograph was obtained with a spinal needle in the lumbosacral midline. Based on this a 3.5 cm midline incision was fashioned overlying the L4-5. Underlying tissues were divided with cautery to provide exposure to the posterior spinal elements at L4-5. Another radiograph confirmed the operative level. A Leksell rongeur was then utilized to remove the spinous processes at L4 and the upper aspect of L5. Midline trough was fashioned using drill and Kerrison punches. This trough  was widened with Kerrison punches and drill. The facet complexes were undercut. The neural foramina were decompressed with Kerrison punches. I worked in the gutter on the left and retracted the dura sac and nerve root medially. There was some broad based disc bulging but no alton disc herniation. There was not felt to be any compromise of the nerve root so I did not pursue a discectomy. Bleeding points were controlled with bone wax and Floseal. With the Valsalva maneuver there was no significant bleeding or evidence of CSF leak. The wound was washed out with a saline solution. A 7 flat JOSE RAMON drain was brought in through a separate stab incision and left in the epidural space. The paraspinous muscle fascia were reapproximated in an interrupted fashion with 0 Vicryl suture. The subcutaneous tissues were closed in layers with 3-0 Vicryl suture. The skin was closed in a running subcuticular fashion with 3-0 Vicryl suture. Steri-Strips and sterile dressing were applied. She did receive preoperative antibiotics.             John Bullock M.D.

## 2021-08-02 NOTE — H&P
Pre-Op H&P  Ernesto Stone  [id number]  1970      Chief complaint: Back pain      Subjective:  Patient is a 51 y.o.female presents for scheduled surgery by Dr. Bullock. She anticipates a LUMBAR LAMINECTOMY WITH/WITHOUT FUSION L4-5 possible discectomy today. Her back has been painful for about a year. She has pain that radiates down bilateral lower extremities, L> R. She has trouble walking for any distance without getting fatigue and pain as well as limping. She has had intermittent loss of control of her bladder. She denies use of assistive device for ambulation or recent falls. Symptoms in her leg improve when lying down-low back and hip pain persist in any position.       Review of Systems:  Constitutional-- No fever, chills or sweats. + fatigue.  CV-- No chest pain, palpitation or syncope  Resp-- No cough, hemoptysis. +SOB  Skin--No rashes or lesions      Allergies:   Allergies   Allergen Reactions   • Antihistamines, Diphenhydramine-Type Hives and Mental Status Change   • Benadryl [Diphenhydramine] Hives and Mental Status Change   • Codeine Nausea And Vomiting         Home Meds:  Medications Prior to Admission   Medication Sig Dispense Refill Last Dose   • albuterol (PROVENTIL HFA;VENTOLIN HFA) 108 (90 BASE) MCG/ACT inhaler Inhale 2 puffs every 4 (four) hours as needed for wheezing.      • albuterol (PROVENTIL) (2.5 MG/3ML) 0.083% nebulizer solution Take 2.5 mg by nebulization As Needed for Wheezing.      • chlorhexidine (HIBICLENS) 4 % external liquid Apply  topically to the appropriate area as directed Daily. To surgical site each day starting 5 days prior to surgery (Patient taking differently: Apply 1 application topically to the appropriate area as directed Daily. To surgical site each day starting 5 days prior to surgery) 120 mL 0    • gabapentin (NEURONTIN) 800 MG tablet Take 800 mg by mouth 3 (Three) Times a Day.      • mupirocin (Bactroban) 2 % ointment into the nostril(s) as directed by  provider 2 (Two) Times a Day. (Patient taking differently: 1 application into the nostril(s) as directed by provider 2 (Two) Times a Day.) 15 g 0    • nicotine (Nicoderm CQ) 21 MG/24HR patch Place 1 patch on the skin as directed by provider Daily. See PCP for refill. 28 patch 0    • oxyCODONE ER (oxyCONTIN) 15 MG tablet extended-release 12 hour Take 15 mg by mouth Every 6 (Six) Hours.            PMH:   Past Medical History:   Diagnosis Date   • Alcoholic (CMS/HCC)    • Anxiety and depression    • Asthma    • Bradycardia    • Disease of thyroid gland    • Dyspnea    • Hx of sepsis 10/2016   • Osteoarthritis    • PVC (premature ventricular contraction)    • RA (rheumatoid arthritis) (CMS/HCC)    • Ulcer of abdomen wall (CMS/HCC)      PSH:    Past Surgical History:   Procedure Laterality Date   • CARDIAC CATHETERIZATION N/A 2017    Procedure: Left Heart Cath;  Surgeon: Don Clifford MD;  Location: [facility] CATH INVASIVE LOCATION;  Service:    •  SECTION      x 2   • CHOLECYSTECTOMY     • FRACTURE SURGERY Left     knee    • FRACTURE SURGERY Left    • KNEE CARTILAGE SURGERY Left    • WRIST SURGERY         Immunization History:  Influenza: No  Pneumococcal: No  Tetanus: UTD  Covid x2:     Social History:   Tobacco:   Social History     Tobacco Use   Smoking Status Current Every Day Smoker   • Packs/day: 0.50   • Years: 30.00   • Pack years: 15.00   • Types: Cigarettes   Smokeless Tobacco Never Used      Alcohol:     Social History     Substance and Sexual Activity   Alcohol Use Not Currently    Comment: quit 12 years ago         Physical Exam: VS: /74 HR 56 RR 16 T 98.2  Sat 99% RA      General Appearance:    Alert, cooperative, no distress, appears stated age   Head:    Normocephalic, without obvious abnormality, atraumatic   Lungs:     Clear to auscultation bilaterally, respirations unlabored    Heart:   Regular rate and rhythm, S1 and S2 normal    Abdomen:    Soft without tenderness    Extremities:   Extremities normal, atraumatic, no cyanosis or edema   Skin:   Skin color, texture, turgor normal, no rashes or lesions   Neurologic:   Grossly intact     Results Review:     LABS:  Lab Results   Component Value Date    WBC 6.00 07/30/2021    HGB 14.3 07/30/2021    HCT 44.1 07/30/2021    MCV 95.7 07/30/2021     07/30/2021    NEUTROABS 2.60 07/30/2021    GLUCOSE 92 07/30/2021    BUN 15 07/30/2021    CREATININE 0.97 07/30/2021    EGFRIFNONA 61 07/30/2021     07/30/2021    K 4.8 07/30/2021     07/30/2021    CO2 31.0 (H) 07/30/2021    MG 2.1 05/02/2017    PHOS 2.9 09/28/2016    CALCIUM 9.8 07/30/2021    ALBUMIN 4.40 07/30/2021    AST 33 (H) 07/30/2021    ALT 38 (H) 07/30/2021    BILITOT 0.3 07/30/2021       RADIOLOGY:  6/28/2021 lumbar MRI:  IMPRESSION:  Degenerative disc change in the lower lumbar disc spaces. At  L4-5, there is moderate acquired-type spinal stenosis.    I reviewed the patient's new clinical results.    Cancer Staging (if applicable)  Cancer Patient: __ yes __no __unknown; If yes, clinical stage T:__ N:__M:__, stage group or __N/A      Impression: Spinal stenosis, lumbar region, with neurogenic claudication      Plan: LUMBAR LAMINECTOMY WITH/WITHOUT FUSION L4-5 possible discectomy      Amie Villagomez, JASON   8/2/2021   10:36 EDT

## 2021-08-02 NOTE — ANESTHESIA PREPROCEDURE EVALUATION
Anesthesia Evaluation                  Airway   Mallampati: I  TM distance: >3 FB  Neck ROM: full  No difficulty expected  Dental      Pulmonary    (+) asthma,shortness of breath,   Cardiovascular     ECG reviewed      ROS comment: Normal stress, normal cath    Neuro/Psych  GI/Hepatic/Renal/Endo    (+)   hepatitis C, liver disease,     Musculoskeletal     Abdominal    Substance History   (+) alcohol use,      OB/GYN          Other   arthritis,                      Anesthesia Plan    ASA 2     general     intravenous induction     Anesthetic plan, all risks, benefits, and alternatives have been provided, discussed and informed consent has been obtained with: patient.    Plan discussed with CRNA.

## 2021-08-02 NOTE — ANESTHESIA POSTPROCEDURE EVALUATION
Patient: Ernesto Stone    Procedure Summary     Date: 08/02/21 Room / Location:  [facility]    Anesthesia Start: 1107 Anesthesia Stop:     Procedure: LUMBAR LAMINECTOMY L4-5 (Left Spine Lumbar) Diagnosis:       Spinal stenosis, lumbar region, with neurogenic claudication      (Spinal stenosis, lumbar region, with neurogenic claudication [M48.062])    Surgeons: John Bullock MD Provider: Markos Edward MD    Anesthesia Type: general ASA Status: 2          Anesthesia Type: general    Vitals  Vitals Value Taken Time   /80 08/02/21 1219   Temp     Pulse 70 08/02/21 1222   Resp     SpO2 94 % 08/02/21 1222   Vitals shown include unvalidated device data.        Post Anesthesia Care and Evaluation    Patient location during evaluation: PACU  Patient participation: complete - patient participated  Level of consciousness: awake and alert  Pain score: 3  Pain management: adequate  Airway patency: patent  Anesthetic complications: No anesthetic complications  PONV Status: none  Cardiovascular status: hemodynamically stable and acceptable  Respiratory status: nonlabored ventilation, acceptable and nasal cannula  Hydration status: acceptable

## 2021-08-02 NOTE — ANESTHESIA PROCEDURE NOTES
Airway  Urgency: elective    Date/Time: 8/2/2021 11:11 AM  Airway not difficult    General Information and Staff    Patient location during procedure: OR  CRNA: Obey Alvarez CRNA    Indications and Patient Condition  Indications for airway management: airway protection    Preoxygenated: yes  MILS not maintained throughout  Mask difficulty assessment: 1 - vent by mask    Final Airway Details  Final airway type: endotracheal airway      Successful airway: ETT  Cuffed: yes   Successful intubation technique: direct laryngoscopy  Endotracheal tube insertion site: oral  Blade: Heredia  Blade size: 2  ETT size (mm): 7.0  Cormack-Lehane Classification: grade I - full view of glottis  Placement verified by: chest auscultation and capnometry   Cuff volume (mL): 5  Measured from: lips  ETT/EBT  to lips (cm): 21  Number of attempts at approach: 1  Assessment: lips, teeth, and gum same as pre-op and atraumatic intubation    Additional Comments  Negative epigastric sounds, Breath sound equal bilaterally with symmetric chest rise and fall

## 2021-08-03 ENCOUNTER — TELEPHONE (OUTPATIENT)
Dept: NEUROSURGERY | Facility: CLINIC | Age: 51
End: 2021-08-03

## 2021-08-03 VITALS
HEART RATE: 52 BPM | SYSTOLIC BLOOD PRESSURE: 129 MMHG | OXYGEN SATURATION: 94 % | WEIGHT: 147.49 LBS | RESPIRATION RATE: 16 BRPM | BODY MASS INDEX: 21.84 KG/M2 | DIASTOLIC BLOOD PRESSURE: 77 MMHG | TEMPERATURE: 97.8 F | HEIGHT: 69 IN

## 2021-08-03 PROCEDURE — 94799 UNLISTED PULMONARY SVC/PX: CPT

## 2021-08-03 PROCEDURE — 99024 POSTOP FOLLOW-UP VISIT: CPT | Performed by: NEUROLOGICAL SURGERY

## 2021-08-03 PROCEDURE — 25010000002 CEFAZOLIN PER 500 MG: Performed by: NEUROLOGICAL SURGERY

## 2021-08-03 RX ADMIN — CEFAZOLIN 2 G: 10 INJECTION, POWDER, FOR SOLUTION INTRAVENOUS at 04:14

## 2021-08-03 RX ADMIN — OXYCODONE HYDROCHLORIDE 15 MG: 15 TABLET ORAL at 09:30

## 2021-08-03 RX ADMIN — GABAPENTIN 800 MG: 400 CAPSULE ORAL at 05:42

## 2021-08-03 RX ADMIN — OXYCODONE HYDROCHLORIDE 15 MG: 15 TABLET ORAL at 04:14

## 2021-08-03 RX ADMIN — ALBUTEROL SULFATE 2.5 MG: 2.5 SOLUTION RESPIRATORY (INHALATION) at 04:25

## 2021-08-03 RX ADMIN — SODIUM CHLORIDE, PRESERVATIVE FREE 3 ML: 5 INJECTION INTRAVENOUS at 09:31

## 2021-08-03 RX ADMIN — OXYCODONE HYDROCHLORIDE AND ACETAMINOPHEN 1 TABLET: 7.5; 325 TABLET ORAL at 05:42

## 2021-08-03 RX ADMIN — IBUPROFEN 600 MG: 600 TABLET, FILM COATED ORAL at 09:29

## 2021-08-03 RX ADMIN — OXYCODONE HYDROCHLORIDE AND ACETAMINOPHEN 1 TABLET: 7.5; 325 TABLET ORAL at 12:38

## 2021-08-03 RX ADMIN — IBUPROFEN 600 MG: 600 TABLET, FILM COATED ORAL at 00:31

## 2021-08-03 NOTE — PLAN OF CARE
Problem: Adult Inpatient Plan of Care  Goal: Plan of Care Review  Outcome: Ongoing, Progressing  Flowsheets  Taken 8/3/2021 0512 by Noemy Bunch RN  Progress: improving  Taken 8/2/2021 1203 by Nasreen Hood RN  Plan of Care Reviewed With: patient  Goal: Patient-Specific Goal (Individualized)  Outcome: Ongoing, Progressing  Goal: Absence of Hospital-Acquired Illness or Injury  Outcome: Ongoing, Progressing  Intervention: Identify and Manage Fall Risk  Recent Flowsheet Documentation  Taken 8/3/2021 0414 by Noemy Bunch RN  Safety Promotion/Fall Prevention:   activity supervised   assistive device/personal items within reach   clutter free environment maintained   room organization consistent   safety round/check completed   toileting scheduled  Taken 8/3/2021 0200 by Noemy Bunch RN  Safety Promotion/Fall Prevention:   activity supervised   assistive device/personal items within reach   clutter free environment maintained   room organization consistent   safety round/check completed   toileting scheduled  Taken 8/3/2021 0031 by Noemy Bunch RN  Safety Promotion/Fall Prevention:   activity supervised   assistive device/personal items within reach   clutter free environment maintained   room organization consistent   safety round/check completed   toileting scheduled  Taken 8/2/2021 2218 by Noemy Bunch RN  Safety Promotion/Fall Prevention:   activity supervised   assistive device/personal items within reach   clutter free environment maintained   room organization consistent   safety round/check completed   toileting scheduled  Taken 8/2/2021 2006 by Noemy Bunch RN  Safety Promotion/Fall Prevention:   activity supervised   assistive device/personal items within reach   clutter free environment maintained   room organization consistent   safety round/check completed   toileting scheduled  Intervention: Prevent Skin Injury  Recent Flowsheet Documentation  Taken 8/3/2021 0414 by  Noemy Bunch RN  Body Position: supine  Taken 8/3/2021 0200 by Noemy Bunch RN  Body Position: supine  Taken 8/3/2021 0031 by Noemy Bunch RN  Body Position: position changed independently  Taken 8/2/2021 2218 by Noemy Bunch RN  Body Position: position changed independently  Taken 8/2/2021 2006 by Noemy Bunch RN  Body Position: sitting up in bed  Intervention: Prevent and Manage VTE (venous thromboembolism) Risk  Recent Flowsheet Documentation  Taken 8/3/2021 0414 by Noemy Bunch RN  VTE Prevention/Management:   bilateral   sequential compression devices on  Taken 8/3/2021 0200 by Noemy Bunch RN  VTE Prevention/Management:   bilateral   sequential compression devices on  Taken 8/3/2021 0031 by Noemy Bunch RN  VTE Prevention/Management:   bilateral   sequential compression devices on  Taken 8/2/2021 2218 by Noemy Bunch RN  VTE Prevention/Management:   bilateral   sequential compression devices on  Taken 8/2/2021 2006 by Noemy Bunch RN  VTE Prevention/Management:   bilateral   sequential compression devices on  Intervention: Prevent Infection  Recent Flowsheet Documentation  Taken 8/3/2021 0414 by Noemy Bunch RN  Infection Prevention:   environmental surveillance performed   rest/sleep promoted  Taken 8/3/2021 0200 by Noemy Bunch RN  Infection Prevention:   environmental surveillance performed   rest/sleep promoted  Taken 8/3/2021 0031 by Noemy Bunch RN  Infection Prevention:   environmental surveillance performed   rest/sleep promoted  Taken 8/2/2021 2218 by Noemy Bunch RN  Infection Prevention:   environmental surveillance performed   rest/sleep promoted  Taken 8/2/2021 2006 by Noemy Bunch RN  Infection Prevention:   environmental surveillance performed   rest/sleep promoted  Goal: Optimal Comfort and Wellbeing  Outcome: Ongoing, Progressing  Intervention: Provide Person-Centered Care  Recent Flowsheet Documentation  Taken 8/2/2021  2006 by Noemy Bunch RN  Trust Relationship/Rapport: care explained  Goal: Readiness for Transition of Care  Outcome: Ongoing, Progressing     Problem: Fall Injury Risk  Goal: Absence of Fall and Fall-Related Injury  Outcome: Ongoing, Progressing  Intervention: Identify and Manage Contributors to Fall Injury Risk  Recent Flowsheet Documentation  Taken 8/3/2021 0414 by Noemy Bunch RN  Medication Review/Management: medications reviewed  Taken 8/3/2021 0200 by Noemy Bunch RN  Medication Review/Management: medications reviewed  Taken 8/3/2021 0031 by Noemy Bucnh RN  Medication Review/Management: medications reviewed  Taken 8/2/2021 2218 by Noemy Bunch RN  Medication Review/Management: medications reviewed  Taken 8/2/2021 2006 by Noemy Bunch RN  Medication Review/Management: medications reviewed  Intervention: Promote Injury-Free Environment  Recent Flowsheet Documentation  Taken 8/3/2021 0414 by Noemy Bunch RN  Safety Promotion/Fall Prevention:   activity supervised   assistive device/personal items within reach   clutter free environment maintained   room organization consistent   safety round/check completed   toileting scheduled  Taken 8/3/2021 0200 by Noemy Bunch RN  Safety Promotion/Fall Prevention:   activity supervised   assistive device/personal items within reach   clutter free environment maintained   room organization consistent   safety round/check completed   toileting scheduled  Taken 8/3/2021 0031 by Noemy Bunch RN  Safety Promotion/Fall Prevention:   activity supervised   assistive device/personal items within reach   clutter free environment maintained   room organization consistent   safety round/check completed   toileting scheduled  Taken 8/2/2021 2218 by Noemy Bunch RN  Safety Promotion/Fall Prevention:   activity supervised   assistive device/personal items within reach   clutter free environment maintained   room organization consistent    safety round/check completed   toileting scheduled  Taken 8/2/2021 2006 by Noemy Bunch RN  Safety Promotion/Fall Prevention:   activity supervised   assistive device/personal items within reach   clutter free environment maintained   room organization consistent   safety round/check completed   toileting scheduled     Problem: Bleeding (Spinal Surgery)  Goal: Absence of Bleeding  Outcome: Ongoing, Progressing     Problem: Bowel Elimination Impaired (Spinal Surgery)  Goal: Effective Bowel Elimination  Outcome: Ongoing, Progressing     Problem: Functional Ability Impaired (Spinal Surgery)  Goal: Optimal Functional Ability  Outcome: Ongoing, Progressing  Intervention: Optimize Functional Status  Recent Flowsheet Documentation  Taken 8/3/2021 0414 by Noemy Bunch RN  Positioning/Transfer Devices:   pillows   in use  Taken 8/3/2021 0200 by Noemy Bunch RN  Positioning/Transfer Devices:   pillows   in use  Taken 8/3/2021 0031 by Noemy Bunch RN  Positioning/Transfer Devices:   pillows   in use  Taken 8/2/2021 2218 by Noemy Bunch RN  Positioning/Transfer Devices:   pillows   in use  Taken 8/2/2021 2006 by Noemy Bunch RN  Activity Management:   ambulated to bathroom   ambulated in nolan  Positioning/Transfer Devices:   pillows   in use     Problem: Infection (Spinal Surgery)  Goal: Absence of Infection Signs and Symptoms  Outcome: Ongoing, Progressing     Problem: Neurologic Impairment (Spinal Surgery)  Goal: Optimal Neurologic Function  Outcome: Ongoing, Progressing  Intervention: Optimize Neurologic Function  Recent Flowsheet Documentation  Taken 8/3/2021 0414 by Noemy Bunch RN  Body Position: supine  Taken 8/3/2021 0200 by Noemy Bunch RN  Body Position: supine  Taken 8/3/2021 0031 by Noemy Bunch RN  Body Position: position changed independently  Taken 8/2/2021 2218 by Noemy Bunch RN  Body Position: position changed independently  Taken 8/2/2021 2006 by Julio C  Noemy CASTRO RN  Body Position: sitting up in bed     Problem: Ongoing Anesthesia Effects (Spinal Surgery)  Goal: Anesthesia/Sedation Recovery  Outcome: Ongoing, Progressing  Intervention: Optimize Anesthesia Recovery  Recent Flowsheet Documentation  Taken 8/3/2021 0414 by Noemy Bunch RN  Safety Promotion/Fall Prevention:   activity supervised   assistive device/personal items within reach   clutter free environment maintained   room organization consistent   safety round/check completed   toileting scheduled  Taken 8/3/2021 0200 by Noemy Bunch RN  Safety Promotion/Fall Prevention:   activity supervised   assistive device/personal items within reach   clutter free environment maintained   room organization consistent   safety round/check completed   toileting scheduled  Taken 8/3/2021 0031 by Noemy Bunch RN  Safety Promotion/Fall Prevention:   activity supervised   assistive device/personal items within reach   clutter free environment maintained   room organization consistent   safety round/check completed   toileting scheduled  Taken 8/2/2021 2218 by Noemy Bunch RN  Safety Promotion/Fall Prevention:   activity supervised   assistive device/personal items within reach   clutter free environment maintained   room organization consistent   safety round/check completed   toileting scheduled  Taken 8/2/2021 2006 by Noemy Bunch RN  Safety Promotion/Fall Prevention:   activity supervised   assistive device/personal items within reach   clutter free environment maintained   room organization consistent   safety round/check completed   toileting scheduled     Problem: Pain (Spinal Surgery)  Goal: Acceptable Pain Control  Outcome: Ongoing, Progressing  Intervention: Prevent or Manage Pain  Recent Flowsheet Documentation  Taken 8/3/2021 0414 by Noemy Bunch RN  Pain Management Interventions: see MAR  Taken 8/3/2021 0031 by Noemy Bunch RN  Pain Management Interventions: see MAR  Taken  8/2/2021 2218 by Noemy Bunch, RN  Pain Management Interventions: see MAR  Taken 8/2/2021 2115 by oNemy Bunch, RN  Pain Management Interventions: see MAR  Taken 8/2/2021 2027 by Noemy Bunch, RN  Pain Management Interventions: see MAR  Taken 8/2/2021 2006 by Noemy Bunch RN  Pain Management Interventions: see MAR     Problem: Postoperative Nausea and Vomiting (Spinal Surgery)  Goal: Nausea and Vomiting Relief  Outcome: Ongoing, Progressing     Problem: Postoperative Urinary Retention (Spinal Surgery)  Goal: Effective Urinary Elimination  Outcome: Ongoing, Progressing   Goal Outcome Evaluation:           Progress: improving     Pt alert and oriented x4. VSS. At beginning of shift, patient frustrated and tearful concerning pain control and pain medication. PRN percocet administered. 1 hour later, pt stated percocet did not help pain. PRN 2mg IV morphine administered. Scheduled roxicodone administered as prescribed. Pain eventually became controlled. Pt placed on 2L O2 per NC due to desaturation to 84%. Ambulated in hallway before bed about 150 ft. Dressing CDI. SCDs in place. HOB 30. No complaints of numbness or tingling.

## 2021-08-03 NOTE — PROGRESS NOTES
"NEUROSURGERY PROGRESS NOTE     LOS: 0 days   Patient Care Team:  Sigifredo Jacques MD as PCP - General (Family Medicine)    Chief Complaint: Low back and left leg pain with walking and standing intolerance.    POD#: 1 Day Post-Op  Procedures:  L4-5 laminectomy.    Interval History:   The patient has ambulated and is voiding independently.  Patient Complaints: Incisional pain.  Patient Denies: Preoperative left leg pain or weakness.    Vital Signs: Blood pressure 114/65, pulse 61, temperature 97.8 °F (36.6 °C), temperature source Oral, resp. rate 18, height 175.3 cm (69\"), weight 66.9 kg (147 lb 7.8 oz), SpO2 96 %.  Intake/Output:     Intake/Output Summary (Last 24 hours) at 8/3/2021 0617  Last data filed at 8/3/2021 0442  Gross per 24 hour   Intake 1200 ml   Output 1685 ml   Net -485 ml     Drain output: 30/45 mL.    Physical Exam:  The patient is awake and alert.  She appears comfortable.  Motor function is intact in her lower extremities.  Dressing is in place on her incision.    Assessment/Plan:  1.  Lumbar stenosis with radiculopathy status post L4-5 decompression.  2.  Disposition: Mobilize patient.  Drain out after lunch and then home this afternoon.  Patient will follow up with JOAQUÍN in my office in approximately 3 weeks.  She will continue her home pain medication as needed.    John Bullock MD  08/03/21  06:17 EDT    "

## 2021-08-04 ENCOUNTER — TELEPHONE (OUTPATIENT)
Dept: NEUROSURGERY | Facility: CLINIC | Age: 51
End: 2021-08-04

## 2021-08-04 NOTE — TELEPHONE ENCOUNTER
Provider:  Kobe  Caller: Patient  Time of call:  9:50am   Phone #:  [phone]  Surgery:  L4-5 Lami  Surgery Date:  08/02/2021  Last visit:   surgery  Next visit:   08/24/2021    YNA:         Reason for call:  Patient was transferred to the clinical line with post-op complaints. On the phone patient stated she had a dime sized amount of blood on her bandage last night and it is about quarter size this morning.   Patient was crying, and also stated she has an extremely bad headache, that she can not get to go away. Patient stated the headache was in the front of her head.     At this time, the call was transferred to KATIE Rose

## 2021-08-04 NOTE — TELEPHONE ENCOUNTER
Spoke with pt.   She is reporting a nonpositional headache and some serosanguineous drainage from her lumbar incision  I answered all her questions and reassured her  She denies evidence of infection or signs of a CSF leak.  She is reassured and was grateful for the call.  She will continue to observe and call with questions or concerns moving forward.

## 2021-08-05 ENCOUNTER — TELEPHONE (OUTPATIENT)
Dept: NEUROSURGERY | Facility: CLINIC | Age: 51
End: 2021-08-05

## 2021-08-05 RX ORDER — CYCLOBENZAPRINE HCL 10 MG
10 TABLET ORAL 3 TIMES DAILY PRN
Qty: 30 TABLET | Refills: 0 | Status: SHIPPED | OUTPATIENT
Start: 2021-08-05 | End: 2021-11-09

## 2021-08-05 NOTE — TELEPHONE ENCOUNTER
Called and spoke with pt.   - her HA resolved   - she is requesting a muscle relaxer  - called in some flexeril   - she is just anxious     Denies perineal sensory change, bowel or bladdery dysfunction or weakness     She will update us on her progress next week

## 2021-08-05 NOTE — TELEPHONE ENCOUNTER
Provider:  Kobe  Caller: Patient  Time of call:  9:50am   Phone #:  [phone]  Surgery:  L4-5 Lami  Surgery Date:  08/02/2021  Last visit:   surgery  Next visit:   08/24/2021     Patient called and said she is trying to be patient,  but it is difficult to walk.  She has pain in her left hip, groin and pain that goes down into her left knee.  It is difficult to put weight on her LLE.  Please advise.

## 2021-08-16 ENCOUNTER — TELEPHONE (OUTPATIENT)
Dept: NEUROSURGERY | Facility: CLINIC | Age: 51
End: 2021-08-16

## 2021-08-16 ENCOUNTER — OFFICE VISIT (OUTPATIENT)
Dept: NEUROSURGERY | Facility: CLINIC | Age: 51
End: 2021-08-16

## 2021-08-16 VITALS
BODY MASS INDEX: 21.62 KG/M2 | DIASTOLIC BLOOD PRESSURE: 72 MMHG | HEIGHT: 69 IN | TEMPERATURE: 97.1 F | WEIGHT: 146 LBS | SYSTOLIC BLOOD PRESSURE: 124 MMHG

## 2021-08-16 DIAGNOSIS — M48.062 SPINAL STENOSIS, LUMBAR REGION, WITH NEUROGENIC CLAUDICATION: Primary | ICD-10-CM

## 2021-08-16 PROCEDURE — 99024 POSTOP FOLLOW-UP VISIT: CPT | Performed by: PHYSICIAN ASSISTANT

## 2021-08-16 NOTE — PROGRESS NOTES
"Patient: Ernesto Stone  : 1970  Gender: female    Primary Care Provider: Sigifredo Jacques MD    Chief Complaint: Wound swelling     History of Present Illness:  Ms. Stone is a 51-year-old woman who presented to our clinic with progressive back and left leg pain.  Studies demonstrated a broad-based disc bulge with generous stenosis at L4-5.  As such on  she underwent decompression L4-5 with Dr. Bullock.  Surgery was without complication.  She contacted our clinic earlier today with complaints of a \"fluid pocket\" around her incision.  Wound check was arranged.  Patient states that the pocket of fluid has been present for several days.  She denies incisional drainage or evidence of dehiscence.  She denies associated erythema, fluctuance or tenderness.  She reports ongoing low back pain however feels that her preoperative leg pain has already improved.  She denies fever, chills or other infectious symptoms.      Past Medical and Surgical History:  Past Medical History:   Diagnosis Date   • Alcoholic (CMS/HCC)    • Anxiety and depression    • Asthma    • Bradycardia    • Dyspnea    • Hx of sepsis 10/2016   • Hypothyroid    • Osteoarthritis    • PVC (premature ventricular contraction)    • RA (rheumatoid arthritis) (CMS/HCC)    • Ulcer of abdomen wall (CMS/HCC)      Past Surgical History:   Procedure Laterality Date   • CARDIAC CATHETERIZATION N/A 2017    Procedure: Left Heart Cath;  Surgeon: Don Clifford MD;  Location: [facility] CATH INVASIVE LOCATION;  Service:    •  SECTION      x 2   • CHOLECYSTECTOMY     • ENDOMETRIAL ABLATION     • FRACTURE SURGERY Left     knee    • KNEE CARTILAGE SURGERY Left    • LAPAROSCOPIC CHOLECYSTECTOMY     • LAPAROSCOPIC TUBAL LIGATION     • LUMBAR LAMINECTOMY DISCECTOMY DECOMPRESSION Left 2021    Procedure: LUMBAR LAMINECTOMY L4-5;  Surgeon: John Bullock MD;  Location: [facility] OR;  Service: Neurosurgery;  Laterality: Left;   • WRIST SURGERY Left  "    hardware present       Current Medications:    Current Outpatient Medications:   •  albuterol (PROVENTIL HFA;VENTOLIN HFA) 108 (90 BASE) MCG/ACT inhaler, Inhale 2 puffs every 4 (four) hours as needed for wheezing., Disp: , Rfl:   •  albuterol (PROVENTIL) (2.5 MG/3ML) 0.083% nebulizer solution, Take 2.5 mg by nebulization As Needed for Wheezing., Disp: , Rfl:   •  cyclobenzaprine (FLEXERIL) 10 MG tablet, Take 1 tablet by mouth 3 (Three) Times a Day As Needed for Muscle Spasms., Disp: 30 tablet, Rfl: 0  •  gabapentin (NEURONTIN) 800 MG tablet, Take 800 mg by mouth 3 (Three) Times a Day., Disp: , Rfl:   •  nicotine (Nicoderm CQ) 21 MG/24HR patch, Place 1 patch on the skin as directed by provider Daily. See PCP for refill., Disp: 28 patch, Rfl: 0  •  oxyCODONE (ROXICODONE) 15 MG immediate release tablet, Take 15 mg by mouth Every 6 (Six) Hours., Disp: , Rfl:     Allergies:  Allergies   Allergen Reactions   • Antihistamines, Diphenhydramine-Type Hives and Mental Status Change   • Benadryl [Diphenhydramine] Hives and Mental Status Change   • Codeine Nausea And Vomiting   • Hydrocodone Nausea Only         Review of Systems   Constitutional: Negative.    HENT: Negative.    Eyes: Negative.    Respiratory: Negative.    Cardiovascular: Negative.    Gastrointestinal: Negative.    Endocrine: Negative.    Genitourinary: Negative.    Musculoskeletal: Positive for back pain and gait problem.   Skin: Negative.    Allergic/Immunologic: Negative.    Neurological: Positive for numbness.   Hematological: Negative.    Psychiatric/Behavioral: Negative.    All other systems reviewed and are negative.        Physical Exam    Patient incision is clean, dry and intact.  There is some mild scabbing within the inferior pole of the incision.  No evidence of dehiscence.  There is no overt erythema.  The area is nontender to palpation.  There is some very mild fluctuance within the superior portion of the incision.    Vitals:    08/16/21 1409  "  BP: 124/72   BP Location: Right arm   Patient Position: Sitting   Cuff Size: Adult   Temp: 97.1 °F (36.2 °C)   Weight: 66.2 kg (146 lb)   Height: 175.3 cm (69\")       Patient's Body mass index is 21.56 kg/m². indicating that she is within normal range (BMI 18.5-24.9). No BMI management plan needed..      Assessment:  1.  Status post L4-5 decompression    Plan:  Ms. Stone is seen today for concerns of a fluid collection beneath her wound.  Her incision is clean, dry and intact.  There is some very mild fluctuance within the superior portion of her incision consistent with a small seroma formation.  There is no associated erythema or evidence of dehiscence.  There has been no drainage.  I advised her to ice this area throughout the day.  This should resolve in a couple of weeks.  I reviewed signs and symptoms of a wound infection which she will closely monitor for.  She will continue to monitor her incision and symptoms and contact us if things worsen.  Otherwise she will keep her current established follow-up.        Vernell Gonzalez PA-C  "

## 2021-08-16 NOTE — TELEPHONE ENCOUNTER
Provider:  Kobe  Caller: pt  Time of call:  9:30  Phone #: [phone]  Surgery:  Lumbar laminectomy  Surgery Date:  8-2-21  Last visit: 7-2-21    Next visit: 8-25-21    YAN:         Reason for call:   Patient called and said she called last week but no one called her back. She has a pocket full of fluid around her surgery site and now it is getting worse. She has no fever and no fluid coming form  the site. Thank you  Please Advise.

## 2021-08-16 NOTE — TELEPHONE ENCOUNTER
Provider: SUDEEP  Caller: AVERY  Relationship to Patient: SELF    Phone Number: [phone]  Reason for Call: POST OP [place]  When was the patient last seen: 8/2/2021  When did it start: AROUND 8/9/2021  Where is it located: [facility] SITE      WARM TRANSFER TO [facility] FOR POST OP [place]

## 2021-08-16 NOTE — TELEPHONE ENCOUNTER
I called and spoke with to see if she could come in around 2:00 to see Vernell. She said that would be fine. Thank you.

## 2021-10-27 ENCOUNTER — HOSPITAL ENCOUNTER (EMERGENCY)
Facility: HOSPITAL | Age: 51
Discharge: HOME | End: 2021-10-27
Attending: EMERGENCY MEDICINE | Admitting: EMERGENCY MEDICINE

## 2021-10-27 ENCOUNTER — APPOINTMENT (OUTPATIENT)
Dept: CT IMAGING | Facility: HOSPITAL | Age: 51
End: 2021-10-27

## 2021-10-27 VITALS
OXYGEN SATURATION: 97 % | RESPIRATION RATE: 16 BRPM | HEIGHT: 69 IN | TEMPERATURE: 98.8 F | SYSTOLIC BLOOD PRESSURE: 117 MMHG | BODY MASS INDEX: 22.96 KG/M2 | HEART RATE: 53 BPM | DIASTOLIC BLOOD PRESSURE: 68 MMHG | WEIGHT: 155 LBS

## 2021-10-27 DIAGNOSIS — R56.9 NONEPILEPTIC EPISODE (HCC): Primary | ICD-10-CM

## 2021-10-27 LAB
ALBUMIN SERPL-MCNC: 4.6 G/DL (ref 3.5–5.2)
ALBUMIN/GLOB SERPL: 1.5 G/DL
ALP SERPL-CCNC: 109 U/L (ref 39–117)
ALT SERPL W P-5'-P-CCNC: 51 U/L (ref 1–33)
AMPHET+METHAMPHET UR QL: NEGATIVE
AMPHETAMINES UR QL: NEGATIVE
ANION GAP SERPL CALCULATED.3IONS-SCNC: 16 MMOL/L (ref 5–15)
AST SERPL-CCNC: 52 U/L (ref 1–32)
BARBITURATES UR QL SCN: NEGATIVE
BASOPHILS # BLD AUTO: 0.04 10*3/MM3 (ref 0–0.2)
BASOPHILS NFR BLD AUTO: 0.5 % (ref 0–1.5)
BENZODIAZ UR QL SCN: POSITIVE
BILIRUB SERPL-MCNC: 0.5 MG/DL (ref 0–1.2)
BILIRUB UR QL STRIP: NEGATIVE
BUN SERPL-MCNC: 6 MG/DL (ref 6–20)
BUN/CREAT SERPL: 6.7 (ref 7–25)
BUPRENORPHINE SERPL-MCNC: POSITIVE NG/ML
CALCIUM SPEC-SCNC: 9.6 MG/DL (ref 8.6–10.5)
CANNABINOIDS SERPL QL: NEGATIVE
CHLORIDE SERPL-SCNC: 102 MMOL/L (ref 98–107)
CK SERPL-CCNC: 152 U/L (ref 20–180)
CLARITY UR: CLEAR
CO2 SERPL-SCNC: 22 MMOL/L (ref 22–29)
COCAINE UR QL: NEGATIVE
COLOR UR: YELLOW
CREAT SERPL-MCNC: 0.9 MG/DL (ref 0.57–1)
DEPRECATED RDW RBC AUTO: 47.5 FL (ref 37–54)
EOSINOPHIL # BLD AUTO: 0.03 10*3/MM3 (ref 0–0.4)
EOSINOPHIL NFR BLD AUTO: 0.4 % (ref 0.3–6.2)
ERYTHROCYTE [DISTWIDTH] IN BLOOD BY AUTOMATED COUNT: 13.4 % (ref 12.3–15.4)
ETHANOL BLD-MCNC: <10 MG/DL (ref 0–10)
GFR SERPL CREATININE-BSD FRML MDRD: 66 ML/MIN/1.73
GLOBULIN UR ELPH-MCNC: 3 GM/DL
GLUCOSE SERPL-MCNC: 155 MG/DL (ref 65–99)
GLUCOSE UR STRIP-MCNC: NEGATIVE MG/DL
HCT VFR BLD AUTO: 44.1 % (ref 34–46.6)
HGB BLD-MCNC: 14.2 G/DL (ref 12–15.9)
HGB UR QL STRIP.AUTO: NEGATIVE
IMM GRANULOCYTES # BLD AUTO: 0.02 10*3/MM3 (ref 0–0.05)
IMM GRANULOCYTES NFR BLD AUTO: 0.3 % (ref 0–0.5)
KETONES UR QL STRIP: ABNORMAL
LEUKOCYTE ESTERASE UR QL STRIP.AUTO: NEGATIVE
LYMPHOCYTES # BLD AUTO: 0.94 10*3/MM3 (ref 0.7–3.1)
LYMPHOCYTES NFR BLD AUTO: 12.6 % (ref 19.6–45.3)
MAGNESIUM SERPL-MCNC: 1.8 MG/DL (ref 1.6–2.6)
MCH RBC QN AUTO: 30.8 PG (ref 26.6–33)
MCHC RBC AUTO-ENTMCNC: 32.2 G/DL (ref 31.5–35.7)
MCV RBC AUTO: 95.7 FL (ref 79–97)
METHADONE UR QL SCN: NEGATIVE
MONOCYTES # BLD AUTO: 0.23 10*3/MM3 (ref 0.1–0.9)
MONOCYTES NFR BLD AUTO: 3.1 % (ref 5–12)
NEUTROPHILS NFR BLD AUTO: 6.22 10*3/MM3 (ref 1.7–7)
NEUTROPHILS NFR BLD AUTO: 83.1 % (ref 42.7–76)
NITRITE UR QL STRIP: NEGATIVE
NRBC BLD AUTO-RTO: 0 /100 WBC (ref 0–0.2)
OPIATES UR QL: NEGATIVE
OXYCODONE UR QL SCN: NEGATIVE
PCP UR QL SCN: NEGATIVE
PH UR STRIP.AUTO: 6 [PH] (ref 5–8)
PLATELET # BLD AUTO: 251 10*3/MM3 (ref 140–450)
PMV BLD AUTO: 10.3 FL (ref 6–12)
POTASSIUM SERPL-SCNC: 3.4 MMOL/L (ref 3.5–5.2)
PROPOXYPH UR QL: NEGATIVE
PROT SERPL-MCNC: 7.6 G/DL (ref 6–8.5)
PROT UR QL STRIP: NEGATIVE
RBC # BLD AUTO: 4.61 10*6/MM3 (ref 3.77–5.28)
SODIUM SERPL-SCNC: 140 MMOL/L (ref 136–145)
SP GR UR STRIP: <=1.005 (ref 1–1.03)
T4 FREE SERPL-MCNC: 1.42 NG/DL (ref 0.93–1.7)
TRICYCLICS UR QL SCN: NEGATIVE
TSH SERPL DL<=0.05 MIU/L-ACNC: 1.14 UIU/ML (ref 0.27–4.2)
UROBILINOGEN UR QL STRIP: ABNORMAL
WBC # BLD AUTO: 7.48 10*3/MM3 (ref 3.4–10.8)

## 2021-10-27 PROCEDURE — 82077 ASSAY SPEC XCP UR&BREATH IA: CPT | Performed by: EMERGENCY MEDICINE

## 2021-10-27 PROCEDURE — 70450 CT HEAD/BRAIN W/O DYE: CPT

## 2021-10-27 PROCEDURE — 81003 URINALYSIS AUTO W/O SCOPE: CPT | Performed by: EMERGENCY MEDICINE

## 2021-10-27 PROCEDURE — 84439 ASSAY OF FREE THYROXINE: CPT | Performed by: EMERGENCY MEDICINE

## 2021-10-27 PROCEDURE — 25010000002 LORAZEPAM PER 2 MG

## 2021-10-27 PROCEDURE — 99284 EMERGENCY DEPT VISIT MOD MDM: CPT

## 2021-10-27 PROCEDURE — 25010000002 LORAZEPAM PER 2 MG: Performed by: EMERGENCY MEDICINE

## 2021-10-27 PROCEDURE — 82550 ASSAY OF CK (CPK): CPT | Performed by: EMERGENCY MEDICINE

## 2021-10-27 PROCEDURE — 80050 GENERAL HEALTH PANEL: CPT | Performed by: EMERGENCY MEDICINE

## 2021-10-27 PROCEDURE — 83735 ASSAY OF MAGNESIUM: CPT | Performed by: EMERGENCY MEDICINE

## 2021-10-27 PROCEDURE — 80306 DRUG TEST PRSMV INSTRMNT: CPT | Performed by: EMERGENCY MEDICINE

## 2021-10-27 PROCEDURE — 96374 THER/PROPH/DIAG INJ IV PUSH: CPT

## 2021-10-27 RX ORDER — LORAZEPAM 2 MG/ML
0.5 INJECTION INTRAMUSCULAR ONCE
Status: COMPLETED | OUTPATIENT
Start: 2021-10-27 | End: 2021-10-27

## 2021-10-27 RX ORDER — LORAZEPAM 2 MG/ML
INJECTION INTRAMUSCULAR
Status: COMPLETED
Start: 2021-10-27 | End: 2021-10-27

## 2021-10-27 RX ORDER — LEVETIRACETAM 10 MG/ML
1000 INJECTION INTRAVASCULAR ONCE
Status: DISCONTINUED | OUTPATIENT
Start: 2021-10-27 | End: 2021-10-27 | Stop reason: HOSPADM

## 2021-10-27 RX ORDER — LORAZEPAM 2 MG/ML
1 INJECTION INTRAMUSCULAR ONCE
Status: COMPLETED | OUTPATIENT
Start: 2021-10-27 | End: 2021-10-27

## 2021-10-27 RX ADMIN — LORAZEPAM 0.5 MG: 2 INJECTION INTRAMUSCULAR at 11:45

## 2021-10-27 RX ADMIN — LORAZEPAM 1 MG: 2 INJECTION INTRAMUSCULAR; INTRAVENOUS at 12:07

## 2021-10-27 RX ADMIN — LORAZEPAM 0.5 MG: 2 INJECTION INTRAMUSCULAR; INTRAVENOUS at 11:45

## 2021-11-02 ENCOUNTER — TELEPHONE (OUTPATIENT)
Dept: NEUROSURGERY | Facility: CLINIC | Age: 51
End: 2021-11-02

## 2021-11-02 NOTE — TELEPHONE ENCOUNTER
Called and s/w pt regarding her missed appointment today.  Pt states she has it down for 11/03 at 11:15am.  Pt planned to be here tomorrow.      I told pt I would have a  give her a call to get this rescheduled.  She verbalized understanding and was thankful for the call.

## 2021-11-09 ENCOUNTER — OFFICE VISIT (OUTPATIENT)
Dept: NEUROSURGERY | Facility: CLINIC | Age: 51
End: 2021-11-09

## 2021-11-09 VITALS
HEART RATE: 54 BPM | TEMPERATURE: 97.5 F | OXYGEN SATURATION: 98 % | HEIGHT: 69 IN | WEIGHT: 149.6 LBS | BODY MASS INDEX: 22.16 KG/M2

## 2021-11-09 DIAGNOSIS — M47.816 FACET ARTHRITIS OF LUMBAR REGION: ICD-10-CM

## 2021-11-09 DIAGNOSIS — Z98.890 S/P LUMBAR LAMINECTOMY: Primary | ICD-10-CM

## 2021-11-09 DIAGNOSIS — M48.062 SPINAL STENOSIS, LUMBAR REGION, WITH NEUROGENIC CLAUDICATION: ICD-10-CM

## 2021-11-09 DIAGNOSIS — M51.36 DEGENERATIVE DISC DISEASE, LUMBAR: ICD-10-CM

## 2021-11-09 PROCEDURE — 99213 OFFICE O/P EST LOW 20 MIN: CPT | Performed by: PHYSICIAN ASSISTANT

## 2021-11-09 NOTE — PROGRESS NOTES
Patient: Ernesto Stone  : 1970  GENDER: female    Primary Care Provider: Sigifredo Jacques MD    Requesting Provider: As above      History    Chief Complaint: back and left leg pain with walking/standing intolerance     History of Present Illness: Ms. Stone is a 51-year-old disabled female who presented to our service with a protracted course of low back difficulties.  More recently, she presented with increased left leg pain with walking/standing intolerances. Preoperative studies revealed a broad-based disc bulge with generous stenosis at L4-5. As such, patient underwent an uncomplicated L4-5 laminectomy on 2021.    Presently, Ms. Stone is 2 months postop. She is thrilled with her current postoperative progress. She notes 80 to 90% improvement in her discomfort overall. She has been up walking without interference. She previously was only able to tolerate 5-10 minutes on her feet, she can now walk upwards of 30 minutes to an hour without needing to sit down. She does harbor some ongoing mechanical complaints, however the occasional ibuprofen is highly effective. She has no other complaints at this time.    Review of Systems   Constitutional: Negative for activity change, appetite change, chills, diaphoresis, fatigue, fever and unexpected weight change.   HENT: Negative for congestion, dental problem, drooling, ear discharge, ear pain, facial swelling, hearing loss, mouth sores, nosebleeds, postnasal drip, rhinorrhea, sinus pressure, sinus pain, sneezing, sore throat, tinnitus, trouble swallowing and voice change.    Eyes: Negative for photophobia, pain, discharge, redness, itching and visual disturbance.   Respiratory: Negative for apnea, cough, choking, chest tightness, shortness of breath, wheezing and stridor.    Cardiovascular: Negative for chest pain, palpitations and leg swelling.   Gastrointestinal: Negative for abdominal distention, abdominal pain, anal bleeding, blood in stool,  constipation, diarrhea, nausea, rectal pain and vomiting.   Endocrine: Negative for cold intolerance, heat intolerance, polydipsia, polyphagia and polyuria.   Genitourinary: Negative for decreased urine volume, difficulty urinating, dysuria, enuresis, flank pain, frequency, genital sores, hematuria and urgency.   Musculoskeletal: Negative for arthralgias, back pain, gait problem, joint swelling, myalgias, neck pain and neck stiffness.   Skin: Negative for color change, pallor, rash and wound.   Allergic/Immunologic: Negative for environmental allergies, food allergies and immunocompromised state.   Neurological: Negative for dizziness, tremors, seizures, syncope, facial asymmetry, speech difficulty, weakness, light-headedness, numbness and headaches.   Hematological: Negative for adenopathy. Does not bruise/bleed easily.   Psychiatric/Behavioral: Negative for agitation, behavioral problems, confusion, decreased concentration, dysphoric mood, hallucinations, self-injury, sleep disturbance and suicidal ideas. The patient is not nervous/anxious and is not hyperactive.    All other systems reviewed and are negative.      Past Medical History:   Diagnosis Date   • Alcoholic (HCC)    • Anxiety and depression    • Asthma    • Bradycardia    • Dyspnea    • Hx of sepsis 10/2016   • Hypothyroid    • Osteoarthritis    • PVC (premature ventricular contraction)    • RA (rheumatoid arthritis) (HCC)    • Ulcer of abdomen wall (HCC)      Past Surgical History:   Procedure Laterality Date   • CARDIAC CATHETERIZATION N/A 2017    Procedure: Left Heart Cath;  Surgeon: Don Clifford MD;  Location: [facility] INVASIVE LOCATION;  Service:    •  SECTION      x 2   • CHOLECYSTECTOMY     • ENDOMETRIAL ABLATION     • FRACTURE SURGERY Left     knee    • KNEE CARTILAGE SURGERY Left    • LAPAROSCOPIC CHOLECYSTECTOMY     • LAPAROSCOPIC TUBAL LIGATION     • LUMBAR LAMINECTOMY DISCECTOMY DECOMPRESSION Left 2021    Procedure:  "LUMBAR LAMINECTOMY L4-5;  Surgeon: John Bullock MD;  [facility]: [facility];  Service: Neurosurgery;  Laterality: Left;   • WRIST SURGERY Left     hardware present       Current Outpatient Medications:   •  albuterol (PROVENTIL HFA;VENTOLIN HFA) 108 (90 BASE) MCG/ACT inhaler, Inhale 2 puffs every 4 (four) hours as needed for wheezing., Disp: , Rfl:   •  albuterol (PROVENTIL) (2.5 MG/3ML) 0.083% nebulizer solution, Take 2.5 mg by nebulization As Needed for Wheezing., Disp: , Rfl:     Allergies   Allergen Reactions   • Antihistamines, Diphenhydramine-Type Hives and Mental Status Change   • Benadryl [Diphenhydramine] Hives and Mental Status Change   • Codeine Nausea And Vomiting   • Hydrocodone Nausea Only       The patient's review of systems, past medical history, past surgical history, family history, and social history have been reviewed at length in the electronic medical record.    Physical Exam:   Pulse 54   Temp 97.5 °F (36.4 °C)   Ht 175.3 cm (69.02\")   Wt 67.9 kg (149 lb 9.6 oz)   SpO2 98%   BMI 22.08 kg/m²   CONSTITUTIONAL:   - Patient is well-nourished  - Pleasant and appears stated age.  PSYCHIATRIC:  - Normal mood and affect  - Behavior is normal.  - Thought content is normal  HENT:   Head: Normocephalic and Atraumatic.   Eyes:     - Pupils are equal, round, and reactive to light.     - EOM are normal.   CV:   - Regular rate and rhythm on palpable radial pulse   - No murmur appreciated   PULMONARY:   - Speaking in full sentences  - No use of accessory muscles   - Breathing is non-labored   - No wheezing   SKIN:  - Clean, dry and intact   - Well healed surgical incision   MUSCULOSKELETAL:  - Back tenderness to palpation is not observed.   - ROM in back is normal.  - Straight leg raise is negative   NEUROLOGICAL:  - A&Ox3  - Attention span, language function, and cognition are intact.  - Strength is intact in the upper and lower extremities to direct testing.  - Muscle tone is normal throughout.  - " Station and gait are normal.  - Sensation is intact to light touch testing throughout.  - Deep tendon reflexes are 2+ and symmetrical.    - Galvan's Sign is negative bilaterally.  - No clonus is elicited.  - Coordination is intact.    Patient's Body mass index is 22.08 kg/m². indicating that she is within normal range (BMI 18.5-24.9). No BMI management plan needed..         Medical Decision Making    Data Review:   1. MRI Lumbar Spine (6/28/2021):  Independent review of radiographic imaging demonstrated multilevel degenerative disc disease with a significant broad-based disc protrusion producing severe spinal stenosis at L4-5.    Diagnosis/Treatment Options:  1. S/P lumbar laminectomy  2. Spinal stenosis, lumbar region, with neurogenic claudication  3. Facet arthritis of lumbar region  4. Degenerative disc disease, lumbar       Follow up:  Ms. Stone is seen today in follow-up 2 months after undergoing uncomplicated L4-5 laminectomy on 8/2/2021. Patient is thrilled with her current postoperative progress. She notes 80 to 90% improvement in her discomfort overall. We went over some general do's/don'ts moving forward. Patient verbalized understanding. She will continue to observe and will be seen back in the office for reassessment in 3 months. This most likely serve as her final visit.    Hayley Robison PA-C   11/9/2021   11:02 EST

## 2021-11-29 NOTE — INTERVAL H&P NOTE
H&P reviewed. The patient was examined and there are no changes to the H&P.       [facility] ([facility])

## 2022-01-31 ENCOUNTER — TRANSCRIBE ORDERS (OUTPATIENT)
Dept: LAB | Facility: HOSPITAL | Age: 52
End: 2022-01-31

## 2022-01-31 ENCOUNTER — LAB (OUTPATIENT)
Dept: LAB | Facility: HOSPITAL | Age: 52
End: 2022-01-31

## 2022-01-31 DIAGNOSIS — Z01.818 OTHER SPECIFIED PRE-OPERATIVE EXAMINATION: Primary | ICD-10-CM

## 2022-01-31 DIAGNOSIS — Z01.818 OTHER SPECIFIED PRE-OPERATIVE EXAMINATION: ICD-10-CM

## 2022-01-31 PROCEDURE — U0004 COV-19 TEST NON-CDC HGH THRU: HCPCS | Performed by: FAMILY MEDICINE

## 2022-01-31 PROCEDURE — C9803 HOPD COVID-19 SPEC COLLECT: HCPCS

## 2022-02-01 LAB — SARS-COV-2 RNA PNL SPEC NAA+PROBE: NOT DETECTED

## 2022-06-08 ENCOUNTER — TRANSCRIBE ORDERS (OUTPATIENT)
Dept: ADMINISTRATIVE | Facility: HOSPITAL | Age: 52
End: 2022-06-08

## 2022-06-08 ENCOUNTER — HOSPITAL ENCOUNTER (OUTPATIENT)
Dept: GENERAL RADIOLOGY | Facility: HOSPITAL | Age: 52
Discharge: HOME | End: 2022-06-08

## 2022-06-08 DIAGNOSIS — M48.062 SPINAL STENOSIS, LUMBAR REGION WITH NEUROGENIC CLAUDICATION: ICD-10-CM

## 2022-06-08 DIAGNOSIS — M79.622 PAIN OF LEFT UPPER ARM: ICD-10-CM

## 2022-06-08 DIAGNOSIS — M25.512 LEFT SHOULDER PAIN, UNSPECIFIED CHRONICITY: ICD-10-CM

## 2022-06-08 DIAGNOSIS — M48.062 SPINAL STENOSIS, LUMBAR REGION WITH NEUROGENIC CLAUDICATION: Primary | ICD-10-CM

## 2022-06-08 DIAGNOSIS — M25.512 LEFT SHOULDER PAIN, UNSPECIFIED CHRONICITY: Primary | ICD-10-CM

## 2022-06-08 PROCEDURE — 72114 X-RAY EXAM L-S SPINE BENDING: CPT

## 2022-06-08 PROCEDURE — 72114 X-RAY EXAM L-S SPINE BENDING: CPT | Performed by: RADIOLOGY

## 2022-06-08 PROCEDURE — 73060 X-RAY EXAM OF HUMERUS: CPT

## 2022-06-08 PROCEDURE — 73060 X-RAY EXAM OF HUMERUS: CPT | Performed by: RADIOLOGY

## 2022-06-08 PROCEDURE — 73030 X-RAY EXAM OF SHOULDER: CPT

## 2022-06-08 PROCEDURE — 73030 X-RAY EXAM OF SHOULDER: CPT | Performed by: RADIOLOGY

## 2022-07-10 ENCOUNTER — APPOINTMENT (OUTPATIENT)
Dept: CT IMAGING | Facility: HOSPITAL | Age: 52
End: 2022-07-10

## 2022-07-10 ENCOUNTER — HOSPITAL ENCOUNTER (EMERGENCY)
Facility: HOSPITAL | Age: 52
Discharge: HOME | End: 2022-07-10
Attending: EMERGENCY MEDICINE | Admitting: STUDENT IN AN ORGANIZED HEALTH CARE EDUCATION/TRAINING PROGRAM

## 2022-07-10 DIAGNOSIS — N30.00 ACUTE CYSTITIS WITHOUT HEMATURIA: ICD-10-CM

## 2022-07-10 DIAGNOSIS — N17.9 AKI (ACUTE KIDNEY INJURY): ICD-10-CM

## 2022-07-10 DIAGNOSIS — F29 PSYCHOSIS, UNSPECIFIED PSYCHOSIS TYPE: Primary | ICD-10-CM

## 2022-07-10 LAB
ACETONE BLD QL: NEGATIVE
ALBUMIN SERPL-MCNC: 5.01 G/DL (ref 3.5–5.2)
ALBUMIN/GLOB SERPL: 1.5 G/DL
ALP SERPL-CCNC: 101 U/L (ref 39–117)
ALT SERPL W P-5'-P-CCNC: 31 U/L (ref 1–33)
AMPHET+METHAMPHET UR QL: POSITIVE
AMPHETAMINES UR QL: POSITIVE
ANION GAP SERPL CALCULATED.3IONS-SCNC: 18 MMOL/L (ref 5–15)
APAP SERPL-MCNC: <5 MCG/ML (ref 0–30)
AST SERPL-CCNC: 34 U/L (ref 1–32)
B-HCG UR QL: NEGATIVE
BACTERIA UR QL AUTO: ABNORMAL /HPF
BARBITURATES UR QL SCN: NEGATIVE
BASOPHILS # BLD AUTO: 0.06 10*3/MM3 (ref 0–0.2)
BASOPHILS NFR BLD AUTO: 0.8 % (ref 0–1.5)
BENZODIAZ UR QL SCN: POSITIVE
BILIRUB SERPL-MCNC: 1 MG/DL (ref 0–1.2)
BILIRUB UR QL STRIP: NEGATIVE
BUN SERPL-MCNC: 16 MG/DL (ref 6–20)
BUN/CREAT SERPL: 9 (ref 7–25)
BUPRENORPHINE SERPL-MCNC: POSITIVE NG/ML
CALCIUM SPEC-SCNC: 10.8 MG/DL (ref 8.6–10.5)
CANNABINOIDS SERPL QL: NEGATIVE
CHLORIDE SERPL-SCNC: 101 MMOL/L (ref 98–107)
CLARITY UR: ABNORMAL
CO2 SERPL-SCNC: 23 MMOL/L (ref 22–29)
COCAINE UR QL: NEGATIVE
COLOR UR: YELLOW
CREAT SERPL-MCNC: 1.77 MG/DL (ref 0.57–1)
D-LACTATE SERPL-SCNC: 0.8 MMOL/L (ref 0.5–2)
DEPRECATED RDW RBC AUTO: 44.3 FL (ref 37–54)
EGFRCR SERPLBLD CKD-EPI 2021: 34.4 ML/MIN/1.73
EOSINOPHIL # BLD AUTO: 0.08 10*3/MM3 (ref 0–0.4)
EOSINOPHIL NFR BLD AUTO: 1 % (ref 0.3–6.2)
ERYTHROCYTE [DISTWIDTH] IN BLOOD BY AUTOMATED COUNT: 12.8 % (ref 12.3–15.4)
ETHANOL BLD-MCNC: <10 MG/DL (ref 0–10)
ETHANOL UR QL: <0.01 %
FLUAV RNA RESP QL NAA+PROBE: NOT DETECTED
FLUBV RNA RESP QL NAA+PROBE: NOT DETECTED
GLOBULIN UR ELPH-MCNC: 3.3 GM/DL
GLUCOSE SERPL-MCNC: 110 MG/DL (ref 65–99)
GLUCOSE UR STRIP-MCNC: NEGATIVE MG/DL
GRAN CASTS URNS QL MICRO: ABNORMAL /LPF
HCT VFR BLD AUTO: 47.8 % (ref 34–46.6)
HGB BLD-MCNC: 15.5 G/DL (ref 12–15.9)
HGB UR QL STRIP.AUTO: NEGATIVE
HYALINE CASTS UR QL AUTO: ABNORMAL /LPF
IMM GRANULOCYTES # BLD AUTO: 0.03 10*3/MM3 (ref 0–0.05)
IMM GRANULOCYTES NFR BLD AUTO: 0.4 % (ref 0–0.5)
KETONES UR QL STRIP: ABNORMAL
LEUKOCYTE ESTERASE UR QL STRIP.AUTO: ABNORMAL
LYMPHOCYTES # BLD AUTO: 1.05 10*3/MM3 (ref 0.7–3.1)
LYMPHOCYTES NFR BLD AUTO: 13.5 % (ref 19.6–45.3)
MAGNESIUM SERPL-MCNC: 1.8 MG/DL (ref 1.6–2.6)
MCH RBC QN AUTO: 30.6 PG (ref 26.6–33)
MCHC RBC AUTO-ENTMCNC: 32.4 G/DL (ref 31.5–35.7)
MCV RBC AUTO: 94.5 FL (ref 79–97)
METHADONE UR QL SCN: NEGATIVE
MONOCYTES # BLD AUTO: 0.53 10*3/MM3 (ref 0.1–0.9)
MONOCYTES NFR BLD AUTO: 6.8 % (ref 5–12)
NEUTROPHILS NFR BLD AUTO: 6.05 10*3/MM3 (ref 1.7–7)
NEUTROPHILS NFR BLD AUTO: 77.5 % (ref 42.7–76)
NITRITE UR QL STRIP: POSITIVE
NRBC BLD AUTO-RTO: 0 /100 WBC (ref 0–0.2)
OPIATES UR QL: NEGATIVE
OXYCODONE UR QL SCN: NEGATIVE
PCP UR QL SCN: NEGATIVE
PH UR STRIP.AUTO: 6 [PH] (ref 5–8)
PLATELET # BLD AUTO: 267 10*3/MM3 (ref 140–450)
PMV BLD AUTO: 10.3 FL (ref 6–12)
POTASSIUM SERPL-SCNC: 4.3 MMOL/L (ref 3.5–5.2)
PROPOXYPH UR QL: NEGATIVE
PROT SERPL-MCNC: 8.3 G/DL (ref 6–8.5)
PROT UR QL STRIP: ABNORMAL
RBC # BLD AUTO: 5.06 10*6/MM3 (ref 3.77–5.28)
RBC # UR STRIP: ABNORMAL /HPF
REF LAB TEST METHOD: ABNORMAL
SALICYLATES SERPL-MCNC: <0.3 MG/DL
SARS-COV-2 RNA RESP QL NAA+PROBE: NOT DETECTED
SODIUM SERPL-SCNC: 142 MMOL/L (ref 136–145)
SP GR UR STRIP: 1.01 (ref 1–1.03)
SQUAMOUS #/AREA URNS HPF: ABNORMAL /HPF
TRICYCLICS UR QL SCN: NEGATIVE
UROBILINOGEN UR QL STRIP: ABNORMAL
WBC # UR STRIP: ABNORMAL /HPF
WBC NRBC COR # BLD: 7.8 10*3/MM3 (ref 3.4–10.8)

## 2022-07-10 PROCEDURE — 74176 CT ABD & PELVIS W/O CONTRAST: CPT

## 2022-07-10 PROCEDURE — 82009 KETONE BODYS QUAL: CPT | Performed by: PHYSICIAN ASSISTANT

## 2022-07-10 PROCEDURE — 25010000002 CEFTRIAXONE PER 250 MG: Performed by: PHYSICIAN ASSISTANT

## 2022-07-10 PROCEDURE — 81001 URINALYSIS AUTO W/SCOPE: CPT | Performed by: EMERGENCY MEDICINE

## 2022-07-10 PROCEDURE — 96365 THER/PROPH/DIAG IV INF INIT: CPT

## 2022-07-10 PROCEDURE — 80179 DRUG ASSAY SALICYLATE: CPT | Performed by: PHYSICIAN ASSISTANT

## 2022-07-10 PROCEDURE — 36415 COLL VENOUS BLD VENIPUNCTURE: CPT

## 2022-07-10 PROCEDURE — 87636 SARSCOV2 & INF A&B AMP PRB: CPT | Performed by: EMERGENCY MEDICINE

## 2022-07-10 PROCEDURE — 81025 URINE PREGNANCY TEST: CPT | Performed by: EMERGENCY MEDICINE

## 2022-07-10 PROCEDURE — 80143 DRUG ASSAY ACETAMINOPHEN: CPT | Performed by: PHYSICIAN ASSISTANT

## 2022-07-10 PROCEDURE — 80306 DRUG TEST PRSMV INSTRMNT: CPT | Performed by: EMERGENCY MEDICINE

## 2022-07-10 PROCEDURE — 83605 ASSAY OF LACTIC ACID: CPT | Performed by: PHYSICIAN ASSISTANT

## 2022-07-10 PROCEDURE — 80053 COMPREHEN METABOLIC PANEL: CPT | Performed by: EMERGENCY MEDICINE

## 2022-07-10 PROCEDURE — 82077 ASSAY SPEC XCP UR&BREATH IA: CPT | Performed by: EMERGENCY MEDICINE

## 2022-07-10 PROCEDURE — 87040 BLOOD CULTURE FOR BACTERIA: CPT | Performed by: PHYSICIAN ASSISTANT

## 2022-07-10 PROCEDURE — 83735 ASSAY OF MAGNESIUM: CPT | Performed by: EMERGENCY MEDICINE

## 2022-07-10 PROCEDURE — 99284 EMERGENCY DEPT VISIT MOD MDM: CPT

## 2022-07-10 PROCEDURE — 85025 COMPLETE CBC W/AUTO DIFF WBC: CPT | Performed by: EMERGENCY MEDICINE

## 2022-07-10 RX ORDER — CEFDINIR 300 MG/1
300 CAPSULE ORAL 2 TIMES DAILY
Qty: 10 CAPSULE | Refills: 0 | Status: SHIPPED | OUTPATIENT
Start: 2022-07-10 | End: 2022-07-15

## 2022-07-10 RX ADMIN — SODIUM CHLORIDE 1000 ML: 9 INJECTION, SOLUTION INTRAVENOUS at 20:34

## 2022-07-10 RX ADMIN — SODIUM CHLORIDE 2 G: 900 INJECTION INTRAVENOUS at 20:35

## 2022-07-10 NOTE — NURSING NOTE
Reviewed labs with ED provider. Instructed that she could usually use some IVF. Patient to go to room ED2. Report given by Agnieszka Storey RN in the ED. Care TF at this time.

## 2022-07-10 NOTE — NURSING NOTE
Called and spoke to Dr. Fernando via phone.instructed him that the patent does not wish to be admitted. At this time she is not interested in detox and states she is not suicidal.  Instructed that if she wants to be DC then she will need to have to try to get someone to pick her up. Ok to DC at this time. [id number]    Patient asked to provide a contact number and attempt get her a ride if she wants to go home. Pt allowed to charge phone. States that she will call when it gets charged.

## 2022-07-10 NOTE — ED PROVIDER NOTES
Subjective   82-year-old white female presents secondary to psychosis and need for psychiatric evaluation.  Patient was brought by EMS after police were involved.  Patient states that everyone is out to get her in there is multiple cameras throughout her house and monitor her Jose movement and that the FBI/feds may be involved in extracting computer information along with information from her phone.  She denies any substance abuse.  She denies any alcohol abuse or withdrawal.  No nausea vomiting or diarrhea.  No fever.          Review of Systems   Constitutional: Negative.  Negative for fever.   HENT: Negative.    Respiratory: Negative.    Cardiovascular: Negative.  Negative for chest pain.   Gastrointestinal: Negative.  Negative for abdominal pain.   Endocrine: Negative.    Genitourinary: Negative.  Negative for dysuria.   Skin: Negative.    Neurological: Negative.    Psychiatric/Behavioral: Negative.  Negative for suicidal ideas.   All other systems reviewed and are negative.      Past Medical History:   Diagnosis Date   • Alcoholic (HCC)    • Anxiety and depression    • Asthma    • Bradycardia    • Dyspnea    • Hx of sepsis 10/2016   • Hypothyroid    • Osteoarthritis    • PVC (premature ventricular contraction)    • RA (rheumatoid arthritis) ([facility])    • Ulcer of abdomen wall (HCC)        Allergies   Allergen Reactions   • Antihistamines, Diphenhydramine-Type Hives and Mental Status Change   • Benadryl [Diphenhydramine] Hives and Mental Status Change   • Codeine Nausea And Vomiting   • Hydrocodone Nausea Only       Past Surgical History:   Procedure Laterality Date   • CARDIAC CATHETERIZATION N/A 2017    Procedure: Left Heart Cath;  Surgeon: Don Clifford MD;  Location: [facility] INVASIVE LOCATION;  Service:    •  SECTION      x 2   • CHOLECYSTECTOMY     • ENDOMETRIAL ABLATION     • FRACTURE SURGERY Left     knee    • KNEE CARTILAGE SURGERY Left    • LAPAROSCOPIC CHOLECYSTECTOMY     •  "LAPAROSCOPIC TUBAL LIGATION     • LUMBAR LAMINECTOMY DISCECTOMY DECOMPRESSION Left 8/2/2021    Procedure: LUMBAR LAMINECTOMY L4-5;  Surgeon: John Bullock MD;  [facility];  Service: Neurosurgery;  Laterality: Left;   • WRIST SURGERY Left     hardware present       Family History   Problem Relation Age of Onset   • Cancer Mother    • Arthritis Mother    • Bradycardia Mother    • Other Mother         Tachycardia    • Arrhythmia Mother    • Cirrhosis Father        Social History     Socioeconomic History   • Marital status: Single   • Number of children: 4   • Highest education level: Associate degree: academic program   Tobacco Use   • Smoking status: Current Every Day Smoker     Packs/day: 0.50     Years: 30.00     Pack years: 15.00     Types: Cigarettes   • Smokeless tobacco: Never Used   • Tobacco comment: Trying to quit, smoking 6 cigarettes per day.    Vaping Use   • Vaping Use: Former   • Substances: Nicotine, THC   Substance and Sexual Activity   • Alcohol use: Not Currently     Comment: quit 12 years ago   • Drug use: Yes     Comment: Suboxone 2 strips \"I never use this but I did this morning.\"   • Sexual activity: Not Currently           Objective   Physical Exam  Vitals and nursing note reviewed.   Constitutional:       General: She is not in acute distress.     Appearance: She is well-developed. She is not diaphoretic.   HENT:      Head: Normocephalic and atraumatic.      Right Ear: External ear normal.      Left Ear: External ear normal.      Nose: Nose normal.   Eyes:      Conjunctiva/sclera: Conjunctivae normal.      Pupils: Pupils are equal, round, and reactive to light.   Neck:      Vascular: No JVD.      Trachea: No tracheal deviation.   Cardiovascular:      Rate and Rhythm: Normal rate and regular rhythm.      Heart sounds: Normal heart sounds. No murmur heard.  Pulmonary:      Effort: Pulmonary effort is normal. No respiratory distress.      Breath sounds: Normal breath sounds. No " wheezing.   Abdominal:      General: Bowel sounds are normal.      Palpations: Abdomen is soft.      Tenderness: There is no abdominal tenderness.   Musculoskeletal:         General: No deformity. Normal range of motion.      Cervical back: Normal range of motion and neck supple.   Skin:     General: Skin is warm and dry.      Coloration: Skin is not pale.      Findings: No erythema or rash.   Neurological:      Mental Status: She is alert and oriented to person, place, and time.      Cranial Nerves: No cranial nerve deficit.   Psychiatric:         Mood and Affect: Mood is anxious.         Behavior: Behavior normal.         Thought Content: Thought content is paranoid. Thought content does not include homicidal or suicidal ideation.         Procedures           ED Course  ED Course as of 07/10/22 2234   Sun Jul 10, 2022   1927 Patient noted to have urinary tract infection along with a creatinine of 1.77.  We will give her a liter of fluids and obtain some extra blood work [JI]   2230 Patient reevaluated.  She is alert and oriented, communicating properly, not responding to internal stimuli, no evidence of ongoing psychosis.  Psychiatry recommended discharge.  I recommended patient admission for her UTI with significant RANGEL, to continue fluids and trend renal function.  Patient declined and would like to go home.  Patient promises she will follow-up with her primary care provider tomorrow for recheck of her kidney function.  We will discharge patient with cefdinir for 5 days, recommend drinking lots of fluids, renal precautions and see her primary care provider tomorrow as planned. [KP]      ED Course User Index  [JI] Bhavin Arzate PA  [KP] Regan Wilkerson MD                                           MDM  Number of Diagnoses or Management Options  Acute cystitis without hematuria: new and requires workup  RANGEL (acute kidney injury) (HCC): new and requires workup  Psychosis, unspecified psychosis type (HCC): new  and requires workup     Amount and/or Complexity of Data Reviewed  Clinical lab tests: ordered and reviewed  Tests in the radiology section of CPT®: reviewed  Decide to obtain previous medical records or to obtain history from someone other than the patient: yes        Final diagnoses:   Psychosis, unspecified psychosis type (HCC)   Acute cystitis without hematuria   RANGEL (acute kidney injury) (HCC)       ED Disposition  ED Disposition     ED Disposition   Discharge    Condition   Stable    Comment   --             Sigifredo Jacques MD  [address]  [facility] [address] [phone]    Schedule an appointment as soon as possible for a visit            Medication List      New Prescriptions    cefdinir 300 MG capsule  Commonly known as: OMNICEF  Take 1 capsule by mouth 2 (Two) Times a Day for 5 days.           Where to Get Your Medications      These medications were sent to [facility] - [address] [facility] [address] - [phone]  - [phone] FX  [address] [facility] [place] [address]    Phone: [phone]   · cefdinir 300 MG capsule          Regan Wilkerson MD  07/10/22 4566

## 2022-07-10 NOTE — NURSING NOTE
Pt presented to Ed with EMS. The EMS stated that she was standing outside of the building screaming.  Pt adamantly denies that she has any issues.  Denies SI/HI/AVH  Depression 3  Anxiety 0   Pt denies any substance use other than using 2 films of suboxone this morning.   The pt states that her ex that is in federal skilled nursing sent people to her house and is setting up cameras everywhere to watch her. When she arrived to intake she tried to show us what was on her phone but there was only a black screen.

## 2022-07-10 NOTE — NURSING NOTE
Received report from Agnieszka ALSTON. Patient remains calm at this time. Denies any SI HI or AVH.

## 2022-07-11 VITALS
DIASTOLIC BLOOD PRESSURE: 82 MMHG | OXYGEN SATURATION: 100 % | SYSTOLIC BLOOD PRESSURE: 142 MMHG | BODY MASS INDEX: 22.96 KG/M2 | RESPIRATION RATE: 16 BRPM | WEIGHT: 155 LBS | TEMPERATURE: 98.7 F | HEART RATE: 79 BPM | HEIGHT: 69 IN

## 2022-07-11 NOTE — DISCHARGE INSTRUCTIONS
Recommend avoiding drug use.  Do not use nonsteroidal anti-inflammatory such as ibuprofen or Aleve as this may further harm your kidneys.  Drink lots of fluids.  Follow-up with your primary care provider tomorrow for recheck of your renal function.  Take antibiotics as prescribed.  Do not hesitate to return here for new onset or worsening symptoms.

## 2022-07-15 LAB
BACTERIA SPEC AEROBE CULT: NORMAL
BACTERIA SPEC AEROBE CULT: NORMAL

## 2022-08-20 ENCOUNTER — HOSPITAL ENCOUNTER (INPATIENT)
Facility: HOSPITAL | Age: 52
LOS: 6 days | Discharge: HOME | End: 2022-08-26
Attending: PSYCHIATRY & NEUROLOGY | Admitting: PSYCHIATRY & NEUROLOGY

## 2022-08-20 ENCOUNTER — HOSPITAL ENCOUNTER (EMERGENCY)
Facility: HOSPITAL | Age: 52
Discharge: PSYCHIATRIC OR SUBSTANCE USE FACILITY | End: 2022-08-20
Attending: EMERGENCY MEDICINE | Admitting: EMERGENCY MEDICINE

## 2022-08-20 VITALS
HEART RATE: 57 BPM | TEMPERATURE: 98.1 F | SYSTOLIC BLOOD PRESSURE: 148 MMHG | RESPIRATION RATE: 16 BRPM | WEIGHT: 135 LBS | HEIGHT: 69 IN | BODY MASS INDEX: 19.99 KG/M2 | OXYGEN SATURATION: 97 % | DIASTOLIC BLOOD PRESSURE: 77 MMHG

## 2022-08-20 DIAGNOSIS — F29 PSYCHOSIS, UNSPECIFIED PSYCHOSIS TYPE: Primary | ICD-10-CM

## 2022-08-20 DIAGNOSIS — N39.0 ACUTE UTI: ICD-10-CM

## 2022-08-20 LAB
ALBUMIN SERPL-MCNC: 4.2 G/DL (ref 3.5–5.2)
ALBUMIN/GLOB SERPL: 1.6 G/DL
ALP SERPL-CCNC: 88 U/L (ref 39–117)
ALT SERPL W P-5'-P-CCNC: 31 U/L (ref 1–33)
AMPHET+METHAMPHET UR QL: POSITIVE
AMPHETAMINES UR QL: POSITIVE
ANION GAP SERPL CALCULATED.3IONS-SCNC: 13.4 MMOL/L (ref 5–15)
AST SERPL-CCNC: 41 U/L (ref 1–32)
BACTERIA UR QL AUTO: ABNORMAL /HPF
BARBITURATES UR QL SCN: NEGATIVE
BASOPHILS # BLD AUTO: 0.07 10*3/MM3 (ref 0–0.2)
BASOPHILS NFR BLD AUTO: 1 % (ref 0–1.5)
BENZODIAZ UR QL SCN: POSITIVE
BILIRUB SERPL-MCNC: 1.2 MG/DL (ref 0–1.2)
BILIRUB UR QL STRIP: NEGATIVE
BUN SERPL-MCNC: 14 MG/DL (ref 6–20)
BUN/CREAT SERPL: 19.4 (ref 7–25)
BUPRENORPHINE SERPL-MCNC: POSITIVE NG/ML
CALCIUM SPEC-SCNC: 9.4 MG/DL (ref 8.6–10.5)
CANNABINOIDS SERPL QL: POSITIVE
CHLORIDE SERPL-SCNC: 103 MMOL/L (ref 98–107)
CLARITY UR: ABNORMAL
CO2 SERPL-SCNC: 21.6 MMOL/L (ref 22–29)
COCAINE UR QL: NEGATIVE
COLOR UR: ABNORMAL
CREAT SERPL-MCNC: 0.72 MG/DL (ref 0.57–1)
CRP SERPL-MCNC: <0.3 MG/DL (ref 0–0.5)
DEPRECATED RDW RBC AUTO: 49.8 FL (ref 37–54)
EGFRCR SERPLBLD CKD-EPI 2021: 101.4 ML/MIN/1.73
EOSINOPHIL # BLD AUTO: 0.03 10*3/MM3 (ref 0–0.4)
EOSINOPHIL NFR BLD AUTO: 0.4 % (ref 0.3–6.2)
ERYTHROCYTE [DISTWIDTH] IN BLOOD BY AUTOMATED COUNT: 13.8 % (ref 12.3–15.4)
ETHANOL BLD-MCNC: <10 MG/DL (ref 0–10)
ETHANOL UR QL: <0.01 %
FLUAV RNA RESP QL NAA+PROBE: NOT DETECTED
FLUBV RNA RESP QL NAA+PROBE: NOT DETECTED
GLOBULIN UR ELPH-MCNC: 2.7 GM/DL
GLUCOSE SERPL-MCNC: 108 MG/DL (ref 65–99)
GLUCOSE UR STRIP-MCNC: NEGATIVE MG/DL
HCT VFR BLD AUTO: 43.4 % (ref 34–46.6)
HGB BLD-MCNC: 14.1 G/DL (ref 12–15.9)
HGB UR QL STRIP.AUTO: NEGATIVE
HOLD SPECIMEN: NORMAL
HOLD SPECIMEN: NORMAL
HYALINE CASTS UR QL AUTO: ABNORMAL /LPF
IMM GRANULOCYTES # BLD AUTO: 0.02 10*3/MM3 (ref 0–0.05)
IMM GRANULOCYTES NFR BLD AUTO: 0.3 % (ref 0–0.5)
KETONES UR QL STRIP: ABNORMAL
LEUKOCYTE ESTERASE UR QL STRIP.AUTO: ABNORMAL
LIPASE SERPL-CCNC: 18 U/L (ref 13–60)
LYMPHOCYTES # BLD AUTO: 1.55 10*3/MM3 (ref 0.7–3.1)
LYMPHOCYTES NFR BLD AUTO: 21.1 % (ref 19.6–45.3)
MCH RBC QN AUTO: 31.5 PG (ref 26.6–33)
MCHC RBC AUTO-ENTMCNC: 32.5 G/DL (ref 31.5–35.7)
MCV RBC AUTO: 96.9 FL (ref 79–97)
METHADONE UR QL SCN: NEGATIVE
MONOCYTES # BLD AUTO: 0.42 10*3/MM3 (ref 0.1–0.9)
MONOCYTES NFR BLD AUTO: 5.7 % (ref 5–12)
NEUTROPHILS NFR BLD AUTO: 5.25 10*3/MM3 (ref 1.7–7)
NEUTROPHILS NFR BLD AUTO: 71.5 % (ref 42.7–76)
NITRITE UR QL STRIP: POSITIVE
NRBC BLD AUTO-RTO: 0 /100 WBC (ref 0–0.2)
OPIATES UR QL: NEGATIVE
OXYCODONE UR QL SCN: POSITIVE
PCP UR QL SCN: NEGATIVE
PH UR STRIP.AUTO: 5.5 [PH] (ref 5–8)
PLATELET # BLD AUTO: 205 10*3/MM3 (ref 140–450)
PMV BLD AUTO: 10.9 FL (ref 6–12)
POTASSIUM SERPL-SCNC: 3.9 MMOL/L (ref 3.5–5.2)
PROPOXYPH UR QL: NEGATIVE
PROT SERPL-MCNC: 6.9 G/DL (ref 6–8.5)
PROT UR QL STRIP: ABNORMAL
QT INTERVAL: 526 MS
QTC INTERVAL: 423 MS
RBC # BLD AUTO: 4.48 10*6/MM3 (ref 3.77–5.28)
RBC # UR STRIP: ABNORMAL /HPF
REF LAB TEST METHOD: ABNORMAL
SARS-COV-2 RNA RESP QL NAA+PROBE: NOT DETECTED
SODIUM SERPL-SCNC: 138 MMOL/L (ref 136–145)
SP GR UR STRIP: 1.02 (ref 1–1.03)
SQUAMOUS #/AREA URNS HPF: ABNORMAL /HPF
TRICYCLICS UR QL SCN: NEGATIVE
UROBILINOGEN UR QL STRIP: ABNORMAL
WBC # UR STRIP: ABNORMAL /HPF
WBC NRBC COR # BLD: 7.34 10*3/MM3 (ref 3.4–10.8)
WHOLE BLOOD HOLD COAG: NORMAL
WHOLE BLOOD HOLD SPECIMEN: NORMAL

## 2022-08-20 PROCEDURE — 83690 ASSAY OF LIPASE: CPT | Performed by: NURSE PRACTITIONER

## 2022-08-20 PROCEDURE — 85025 COMPLETE CBC W/AUTO DIFF WBC: CPT | Performed by: NURSE PRACTITIONER

## 2022-08-20 PROCEDURE — 99285 EMERGENCY DEPT VISIT HI MDM: CPT

## 2022-08-20 PROCEDURE — 63710000001 ONDANSETRON PER 8 MG: Performed by: PSYCHIATRY & NEUROLOGY

## 2022-08-20 PROCEDURE — 80306 DRUG TEST PRSMV INSTRMNT: CPT | Performed by: NURSE PRACTITIONER

## 2022-08-20 PROCEDURE — 93005 ELECTROCARDIOGRAM TRACING: CPT | Performed by: PSYCHIATRY & NEUROLOGY

## 2022-08-20 PROCEDURE — 86140 C-REACTIVE PROTEIN: CPT | Performed by: NURSE PRACTITIONER

## 2022-08-20 PROCEDURE — 80053 COMPREHEN METABOLIC PANEL: CPT | Performed by: NURSE PRACTITIONER

## 2022-08-20 PROCEDURE — 87636 SARSCOV2 & INF A&B AMP PRB: CPT | Performed by: EMERGENCY MEDICINE

## 2022-08-20 PROCEDURE — 36415 COLL VENOUS BLD VENIPUNCTURE: CPT

## 2022-08-20 PROCEDURE — 81001 URINALYSIS AUTO W/SCOPE: CPT | Performed by: NURSE PRACTITIONER

## 2022-08-20 PROCEDURE — 25010000002 KETOROLAC TROMETHAMINE PER 15 MG: Performed by: NURSE PRACTITIONER

## 2022-08-20 PROCEDURE — 99223 1ST HOSP IP/OBS HIGH 75: CPT | Performed by: PSYCHIATRY & NEUROLOGY

## 2022-08-20 PROCEDURE — 82077 ASSAY SPEC XCP UR&BREATH IA: CPT | Performed by: NURSE PRACTITIONER

## 2022-08-20 PROCEDURE — 87086 URINE CULTURE/COLONY COUNT: CPT | Performed by: NURSE PRACTITIONER

## 2022-08-20 PROCEDURE — 87186 SC STD MICRODIL/AGAR DIL: CPT | Performed by: NURSE PRACTITIONER

## 2022-08-20 PROCEDURE — 87077 CULTURE AEROBIC IDENTIFY: CPT | Performed by: NURSE PRACTITIONER

## 2022-08-20 PROCEDURE — HZ2ZZZZ DETOXIFICATION SERVICES FOR SUBSTANCE ABUSE TREATMENT: ICD-10-PCS | Performed by: PSYCHIATRY & NEUROLOGY

## 2022-08-20 PROCEDURE — 96374 THER/PROPH/DIAG INJ IV PUSH: CPT

## 2022-08-20 RX ORDER — BENZONATATE 100 MG/1
100 CAPSULE ORAL 3 TIMES DAILY PRN
Status: DISCONTINUED | OUTPATIENT
Start: 2022-08-20 | End: 2022-08-26 | Stop reason: HOSPADM

## 2022-08-20 RX ORDER — HYDROXYZINE 50 MG/1
50 TABLET, FILM COATED ORAL EVERY 6 HOURS PRN
Status: DISCONTINUED | OUTPATIENT
Start: 2022-08-20 | End: 2022-08-26 | Stop reason: HOSPADM

## 2022-08-20 RX ORDER — ALBUTEROL SULFATE 90 UG/1
2 AEROSOL, METERED RESPIRATORY (INHALATION) EVERY 4 HOURS PRN
Status: DISCONTINUED | OUTPATIENT
Start: 2022-08-20 | End: 2022-08-26 | Stop reason: HOSPADM

## 2022-08-20 RX ORDER — KETOROLAC TROMETHAMINE 30 MG/ML
30 INJECTION, SOLUTION INTRAMUSCULAR; INTRAVENOUS ONCE
Status: COMPLETED | OUTPATIENT
Start: 2022-08-20 | End: 2022-08-20

## 2022-08-20 RX ORDER — ALUMINA, MAGNESIA, AND SIMETHICONE 2400; 2400; 240 MG/30ML; MG/30ML; MG/30ML
15 SUSPENSION ORAL EVERY 6 HOURS PRN
Status: DISCONTINUED | OUTPATIENT
Start: 2022-08-20 | End: 2022-08-26 | Stop reason: HOSPADM

## 2022-08-20 RX ORDER — GABAPENTIN 800 MG/1
800 TABLET ORAL 3 TIMES DAILY
COMMUNITY
End: 2022-08-26 | Stop reason: HOSPADM

## 2022-08-20 RX ORDER — LOPERAMIDE HYDROCHLORIDE 2 MG/1
2 CAPSULE ORAL
Status: DISCONTINUED | OUTPATIENT
Start: 2022-08-20 | End: 2022-08-26 | Stop reason: HOSPADM

## 2022-08-20 RX ORDER — BENZTROPINE MESYLATE 1 MG/1
2 TABLET ORAL ONCE AS NEEDED
Status: DISCONTINUED | OUTPATIENT
Start: 2022-08-20 | End: 2022-08-26 | Stop reason: HOSPADM

## 2022-08-20 RX ORDER — ONDANSETRON 4 MG/1
4 TABLET, FILM COATED ORAL EVERY 6 HOURS PRN
Status: DISCONTINUED | OUTPATIENT
Start: 2022-08-20 | End: 2022-08-26 | Stop reason: HOSPADM

## 2022-08-20 RX ORDER — CITALOPRAM 20 MG/1
20 TABLET ORAL DAILY
Status: DISCONTINUED | OUTPATIENT
Start: 2022-08-20 | End: 2022-08-26 | Stop reason: HOSPADM

## 2022-08-20 RX ORDER — CEFDINIR 300 MG/1
300 CAPSULE ORAL ONCE
Status: COMPLETED | OUTPATIENT
Start: 2022-08-20 | End: 2022-08-20

## 2022-08-20 RX ORDER — CITALOPRAM 20 MG/1
20 TABLET ORAL DAILY
Status: ON HOLD | COMMUNITY
End: 2022-08-26 | Stop reason: SDUPTHER

## 2022-08-20 RX ORDER — GABAPENTIN 400 MG/1
800 CAPSULE ORAL 3 TIMES DAILY
Status: CANCELLED | OUTPATIENT
Start: 2022-08-20

## 2022-08-20 RX ORDER — ACETAMINOPHEN 325 MG/1
650 TABLET ORAL EVERY 6 HOURS PRN
Status: DISCONTINUED | OUTPATIENT
Start: 2022-08-20 | End: 2022-08-20

## 2022-08-20 RX ORDER — FAMOTIDINE 20 MG/1
20 TABLET, FILM COATED ORAL 2 TIMES DAILY PRN
Status: DISCONTINUED | OUTPATIENT
Start: 2022-08-20 | End: 2022-08-26 | Stop reason: HOSPADM

## 2022-08-20 RX ORDER — ECHINACEA PURPUREA EXTRACT 125 MG
2 TABLET ORAL AS NEEDED
Status: DISCONTINUED | OUTPATIENT
Start: 2022-08-20 | End: 2022-08-26 | Stop reason: HOSPADM

## 2022-08-20 RX ORDER — OXYCODONE HYDROCHLORIDE 10 MG/1
10 TABLET ORAL TAKE AS DIRECTED
COMMUNITY
End: 2022-08-26 | Stop reason: HOSPADM

## 2022-08-20 RX ORDER — TRAZODONE HYDROCHLORIDE 50 MG/1
50 TABLET ORAL NIGHTLY PRN
Status: DISCONTINUED | OUTPATIENT
Start: 2022-08-20 | End: 2022-08-26 | Stop reason: HOSPADM

## 2022-08-20 RX ORDER — CEFDINIR 300 MG/1
300 CAPSULE ORAL EVERY 12 HOURS SCHEDULED
Status: DISCONTINUED | OUTPATIENT
Start: 2022-08-20 | End: 2022-08-26 | Stop reason: HOSPADM

## 2022-08-20 RX ORDER — IBUPROFEN 400 MG/1
400 TABLET ORAL EVERY 6 HOURS PRN
Status: DISCONTINUED | OUTPATIENT
Start: 2022-08-20 | End: 2022-08-26 | Stop reason: HOSPADM

## 2022-08-20 RX ORDER — SODIUM CHLORIDE 0.9 % (FLUSH) 0.9 %
10 SYRINGE (ML) INJECTION AS NEEDED
Status: DISCONTINUED | OUTPATIENT
Start: 2022-08-20 | End: 2022-08-20 | Stop reason: HOSPADM

## 2022-08-20 RX ORDER — BENZTROPINE MESYLATE 1 MG/ML
1 INJECTION INTRAMUSCULAR; INTRAVENOUS ONCE AS NEEDED
Status: DISCONTINUED | OUTPATIENT
Start: 2022-08-20 | End: 2022-08-26 | Stop reason: HOSPADM

## 2022-08-20 RX ADMIN — SODIUM CHLORIDE 1000 ML: 9 INJECTION, SOLUTION INTRAVENOUS at 07:19

## 2022-08-20 RX ADMIN — CEFDINIR 300 MG: 300 CAPSULE ORAL at 12:22

## 2022-08-20 RX ADMIN — CEFDINIR 300 MG: 300 CAPSULE ORAL at 20:11

## 2022-08-20 RX ADMIN — KETOROLAC TROMETHAMINE 30 MG: 30 INJECTION, SOLUTION INTRAMUSCULAR at 07:18

## 2022-08-20 RX ADMIN — ONDANSETRON HYDROCHLORIDE 4 MG: 4 TABLET, FILM COATED ORAL at 12:24

## 2022-08-20 RX ADMIN — CITALOPRAM HYDROBROMIDE 20 MG: 20 TABLET ORAL at 20:22

## 2022-08-20 RX ADMIN — CEFDINIR 300 MG: 300 CAPSULE ORAL at 09:36

## 2022-08-21 LAB
BH CV ECHO MEAS - ACS: 1.6 CM
BH CV ECHO MEAS - AO MAX PG: 9.5 MMHG
BH CV ECHO MEAS - AO MEAN PG: 5 MMHG
BH CV ECHO MEAS - AO ROOT DIAM: 3.1 CM
BH CV ECHO MEAS - AO V2 MAX: 154 CM/SEC
BH CV ECHO MEAS - AO V2 VTI: 41.6 CM
BH CV ECHO MEAS - EDV(CUBED): 117.6 ML
BH CV ECHO MEAS - EDV(MOD-SP4): 89.7 ML
BH CV ECHO MEAS - EF(MOD-SP4): 82.1 %
BH CV ECHO MEAS - ESV(CUBED): 39.3 ML
BH CV ECHO MEAS - ESV(MOD-SP4): 16.1 ML
BH CV ECHO MEAS - FS: 30.6 %
BH CV ECHO MEAS - IVS/LVPW: 0.83 CM
BH CV ECHO MEAS - IVSD: 1 CM
BH CV ECHO MEAS - LA DIMENSION: 2.5 CM
BH CV ECHO MEAS - LAT PEAK E' VEL: 10.6 CM/SEC
BH CV ECHO MEAS - LV DIASTOLIC VOL/BSA (35-75): 51.2 CM2
BH CV ECHO MEAS - LV MASS(C)D: 200.5 GRAMS
BH CV ECHO MEAS - LV SYSTOLIC VOL/BSA (12-30): 9.2 CM2
BH CV ECHO MEAS - LVIDD: 4.9 CM
BH CV ECHO MEAS - LVIDS: 3.4 CM
BH CV ECHO MEAS - LVOT AREA: 3.5 CM2
BH CV ECHO MEAS - LVOT DIAM: 2.1 CM
BH CV ECHO MEAS - LVPWD: 1.2 CM
BH CV ECHO MEAS - MED PEAK E' VEL: 9.1 CM/SEC
BH CV ECHO MEAS - MV A MAX VEL: 76.7 CM/SEC
BH CV ECHO MEAS - MV E MAX VEL: 83.1 CM/SEC
BH CV ECHO MEAS - MV E/A: 1.08
BH CV ECHO MEAS - PA ACC TIME: 0.15 SEC
BH CV ECHO MEAS - PA PR(ACCEL): 12.4 MMHG
BH CV ECHO MEAS - RAP SYSTOLE: 10 MMHG
BH CV ECHO MEAS - RVSP: 25.2 MMHG
BH CV ECHO MEAS - SI(MOD-SP4): 42 ML/M2
BH CV ECHO MEAS - SV(MOD-SP4): 73.6 ML
BH CV ECHO MEAS - TAPSE (>1.6): 3.1 CM
BH CV ECHO MEAS - TR MAX PG: 15.2 MMHG
BH CV ECHO MEAS - TR MAX VEL: 195 CM/SEC
BH CV ECHO MEASUREMENTS AVERAGE E/E' RATIO: 8.44
LEFT ATRIUM VOLUME INDEX: 9.5 ML/M2
LV EF 2D ECHO EST: 60 %
MAXIMAL PREDICTED HEART RATE: 169 BPM
STRESS TARGET HR: 144 BPM

## 2022-08-21 PROCEDURE — 99233 SBSQ HOSP IP/OBS HIGH 50: CPT | Performed by: PSYCHIATRY & NEUROLOGY

## 2022-08-21 PROCEDURE — 63710000001 ONDANSETRON PER 8 MG: Performed by: PSYCHIATRY & NEUROLOGY

## 2022-08-21 PROCEDURE — 63710000001 PROMETHAZINE PER 12.5 MG: Performed by: PSYCHIATRY & NEUROLOGY

## 2022-08-21 RX ORDER — LORAZEPAM 1 MG/1
1 TABLET ORAL EVERY 4 HOURS PRN
Status: ACTIVE | OUTPATIENT
Start: 2022-08-21 | End: 2022-08-22

## 2022-08-21 RX ORDER — CLONIDINE HYDROCHLORIDE 0.1 MG/1
0.1 TABLET ORAL 2 TIMES DAILY PRN
Status: ACTIVE | OUTPATIENT
Start: 2022-08-24 | End: 2022-08-25

## 2022-08-21 RX ORDER — CLONIDINE HYDROCHLORIDE 0.1 MG/1
0.1 TABLET ORAL 4 TIMES DAILY PRN
Status: ACTIVE | OUTPATIENT
Start: 2022-08-22 | End: 2022-08-23

## 2022-08-21 RX ORDER — NICOTINE 21 MG/24HR
1 PATCH, TRANSDERMAL 24 HOURS TRANSDERMAL
Status: DISCONTINUED | OUTPATIENT
Start: 2022-08-21 | End: 2022-08-26 | Stop reason: HOSPADM

## 2022-08-21 RX ORDER — CLONIDINE HYDROCHLORIDE 0.1 MG/1
0.1 TABLET ORAL ONCE AS NEEDED
Status: ACTIVE | OUTPATIENT
Start: 2022-08-25 | End: 2022-08-26

## 2022-08-21 RX ORDER — LORAZEPAM 1 MG/1
1 TABLET ORAL
Status: COMPLETED | OUTPATIENT
Start: 2022-08-21 | End: 2022-08-22

## 2022-08-21 RX ORDER — CYCLOBENZAPRINE HCL 10 MG
10 TABLET ORAL 3 TIMES DAILY PRN
Status: DISCONTINUED | OUTPATIENT
Start: 2022-08-21 | End: 2022-08-26 | Stop reason: HOSPADM

## 2022-08-21 RX ORDER — LORAZEPAM 0.5 MG/1
0.5 TABLET ORAL EVERY 4 HOURS PRN
Status: ACTIVE | OUTPATIENT
Start: 2022-08-22 | End: 2022-08-23

## 2022-08-21 RX ORDER — DICYCLOMINE HYDROCHLORIDE 10 MG/1
10 CAPSULE ORAL 3 TIMES DAILY PRN
Status: DISCONTINUED | OUTPATIENT
Start: 2022-08-21 | End: 2022-08-26 | Stop reason: HOSPADM

## 2022-08-21 RX ORDER — PROMETHAZINE HYDROCHLORIDE 12.5 MG/1
12.5 TABLET ORAL EVERY 6 HOURS PRN
Status: DISCONTINUED | OUTPATIENT
Start: 2022-08-21 | End: 2022-08-26 | Stop reason: HOSPADM

## 2022-08-21 RX ORDER — CLONIDINE HYDROCHLORIDE 0.1 MG/1
0.1 TABLET ORAL 4 TIMES DAILY PRN
Status: ACTIVE | OUTPATIENT
Start: 2022-08-21 | End: 2022-08-22

## 2022-08-21 RX ORDER — LORAZEPAM 0.5 MG/1
0.5 TABLET ORAL
Status: COMPLETED | OUTPATIENT
Start: 2022-08-22 | End: 2022-08-23

## 2022-08-21 RX ORDER — CLONIDINE HYDROCHLORIDE 0.1 MG/1
0.1 TABLET ORAL 3 TIMES DAILY PRN
Status: ACTIVE | OUTPATIENT
Start: 2022-08-23 | End: 2022-08-24

## 2022-08-21 RX ORDER — HYDRALAZINE HYDROCHLORIDE 25 MG/1
25 TABLET, FILM COATED ORAL DAILY PRN
Status: DISCONTINUED | OUTPATIENT
Start: 2022-08-21 | End: 2022-08-26 | Stop reason: HOSPADM

## 2022-08-21 RX ADMIN — CEFDINIR 300 MG: 300 CAPSULE ORAL at 20:32

## 2022-08-21 RX ADMIN — CEFDINIR 300 MG: 300 CAPSULE ORAL at 08:07

## 2022-08-21 RX ADMIN — CITALOPRAM HYDROBROMIDE 20 MG: 20 TABLET ORAL at 08:07

## 2022-08-21 RX ADMIN — LORAZEPAM 1 MG: 1 TABLET ORAL at 22:16

## 2022-08-21 RX ADMIN — ONDANSETRON HYDROCHLORIDE 4 MG: 4 TABLET, FILM COATED ORAL at 08:07

## 2022-08-21 RX ADMIN — IBUPROFEN 400 MG: 400 TABLET, FILM COATED ORAL at 15:47

## 2022-08-21 RX ADMIN — HYDROXYZINE HYDROCHLORIDE 50 MG: 50 TABLET ORAL at 15:47

## 2022-08-21 RX ADMIN — ONDANSETRON HYDROCHLORIDE 4 MG: 4 TABLET, FILM COATED ORAL at 01:59

## 2022-08-21 RX ADMIN — IBUPROFEN 400 MG: 400 TABLET, FILM COATED ORAL at 08:07

## 2022-08-21 RX ADMIN — CYCLOBENZAPRINE 10 MG: 10 TABLET, FILM COATED ORAL at 15:47

## 2022-08-21 RX ADMIN — NICOTINE TRANSDERMAL SYSTEM 1 PATCH: 21 PATCH, EXTENDED RELEASE TRANSDERMAL at 15:51

## 2022-08-21 RX ADMIN — PROMETHAZINE HYDROCHLORIDE 12.5 MG: 12.5 TABLET ORAL at 15:43

## 2022-08-22 LAB
BACTERIA SPEC AEROBE CULT: ABNORMAL
TSH SERPL DL<=0.05 MIU/L-ACNC: 1.84 UIU/ML (ref 0.27–4.2)

## 2022-08-22 PROCEDURE — 84443 ASSAY THYROID STIM HORMONE: CPT | Performed by: INTERNAL MEDICINE

## 2022-08-22 PROCEDURE — 63710000001 ONDANSETRON PER 8 MG: Performed by: PSYCHIATRY & NEUROLOGY

## 2022-08-22 PROCEDURE — 63710000001 PROMETHAZINE PER 12.5 MG: Performed by: PSYCHIATRY & NEUROLOGY

## 2022-08-22 PROCEDURE — 99232 SBSQ HOSP IP/OBS MODERATE 35: CPT | Performed by: PSYCHIATRY & NEUROLOGY

## 2022-08-22 RX ORDER — SODIUM CHLORIDE 9 MG/ML
1000 INJECTION, SOLUTION INTRAVENOUS ONCE
Status: DISCONTINUED | OUTPATIENT
Start: 2022-08-22 | End: 2022-08-22

## 2022-08-22 RX ORDER — ONDANSETRON 2 MG/ML
4 INJECTION INTRAMUSCULAR; INTRAVENOUS ONCE
Status: DISCONTINUED | OUTPATIENT
Start: 2022-08-22 | End: 2022-08-22

## 2022-08-22 RX ADMIN — LORAZEPAM 0.5 MG: 0.5 TABLET ORAL at 21:21

## 2022-08-22 RX ADMIN — IBUPROFEN 400 MG: 400 TABLET, FILM COATED ORAL at 09:05

## 2022-08-22 RX ADMIN — PROMETHAZINE HYDROCHLORIDE 12.5 MG: 12.5 TABLET ORAL at 09:05

## 2022-08-22 RX ADMIN — CEFDINIR 300 MG: 300 CAPSULE ORAL at 21:21

## 2022-08-22 RX ADMIN — ONDANSETRON HYDROCHLORIDE 4 MG: 4 TABLET, FILM COATED ORAL at 19:18

## 2022-08-22 RX ADMIN — HYDROXYZINE HYDROCHLORIDE 50 MG: 50 TABLET ORAL at 09:05

## 2022-08-22 RX ADMIN — LORAZEPAM 1 MG: 1 TABLET ORAL at 09:03

## 2022-08-22 RX ADMIN — HYDROXYZINE HYDROCHLORIDE 50 MG: 50 TABLET ORAL at 21:23

## 2022-08-22 RX ADMIN — LORAZEPAM 1 MG: 1 TABLET ORAL at 15:31

## 2022-08-22 RX ADMIN — CYCLOBENZAPRINE 10 MG: 10 TABLET, FILM COATED ORAL at 09:05

## 2022-08-22 RX ADMIN — CITALOPRAM HYDROBROMIDE 20 MG: 20 TABLET ORAL at 09:03

## 2022-08-22 RX ADMIN — NICOTINE TRANSDERMAL SYSTEM 1 PATCH: 21 PATCH, EXTENDED RELEASE TRANSDERMAL at 09:03

## 2022-08-22 RX ADMIN — CEFDINIR 300 MG: 300 CAPSULE ORAL at 09:03

## 2022-08-23 PROCEDURE — 63710000001 PROMETHAZINE PER 12.5 MG: Performed by: PSYCHIATRY & NEUROLOGY

## 2022-08-23 PROCEDURE — 99232 SBSQ HOSP IP/OBS MODERATE 35: CPT | Performed by: PSYCHIATRY & NEUROLOGY

## 2022-08-23 RX ADMIN — IBUPROFEN 400 MG: 400 TABLET, FILM COATED ORAL at 10:35

## 2022-08-23 RX ADMIN — PROMETHAZINE HYDROCHLORIDE 12.5 MG: 12.5 TABLET ORAL at 10:35

## 2022-08-23 RX ADMIN — HYDROXYZINE HYDROCHLORIDE 50 MG: 50 TABLET ORAL at 10:35

## 2022-08-23 RX ADMIN — CEFDINIR 300 MG: 300 CAPSULE ORAL at 08:52

## 2022-08-23 RX ADMIN — CYCLOBENZAPRINE 10 MG: 10 TABLET, FILM COATED ORAL at 10:35

## 2022-08-23 RX ADMIN — LORAZEPAM 0.5 MG: 0.5 TABLET ORAL at 08:52

## 2022-08-23 RX ADMIN — DICYCLOMINE HYDROCHLORIDE 10 MG: 10 CAPSULE ORAL at 10:35

## 2022-08-23 RX ADMIN — CITALOPRAM HYDROBROMIDE 20 MG: 20 TABLET ORAL at 08:52

## 2022-08-23 RX ADMIN — NICOTINE TRANSDERMAL SYSTEM 1 PATCH: 21 PATCH, EXTENDED RELEASE TRANSDERMAL at 08:52

## 2022-08-23 RX ADMIN — HYDROXYZINE HYDROCHLORIDE 50 MG: 50 TABLET ORAL at 20:40

## 2022-08-23 RX ADMIN — CEFDINIR 300 MG: 300 CAPSULE ORAL at 20:40

## 2022-08-23 RX ADMIN — LORAZEPAM 0.5 MG: 0.5 TABLET ORAL at 14:48

## 2022-08-24 PROCEDURE — 63710000001 PROMETHAZINE PER 12.5 MG: Performed by: PSYCHIATRY & NEUROLOGY

## 2022-08-24 PROCEDURE — 99232 SBSQ HOSP IP/OBS MODERATE 35: CPT | Performed by: PSYCHIATRY & NEUROLOGY

## 2022-08-24 PROCEDURE — 63710000001 ONDANSETRON PER 8 MG: Performed by: PSYCHIATRY & NEUROLOGY

## 2022-08-24 RX ORDER — RISPERIDONE 0.25 MG/1
0.25 TABLET ORAL EVERY 12 HOURS SCHEDULED
Status: DISCONTINUED | OUTPATIENT
Start: 2022-08-24 | End: 2022-08-25

## 2022-08-24 RX ADMIN — RISPERIDONE 0.25 MG: 0.25 TABLET, FILM COATED ORAL at 11:44

## 2022-08-24 RX ADMIN — CITALOPRAM HYDROBROMIDE 20 MG: 20 TABLET ORAL at 08:44

## 2022-08-24 RX ADMIN — IBUPROFEN 400 MG: 400 TABLET, FILM COATED ORAL at 02:38

## 2022-08-24 RX ADMIN — PROMETHAZINE HYDROCHLORIDE 12.5 MG: 12.5 TABLET ORAL at 20:40

## 2022-08-24 RX ADMIN — CEFDINIR 300 MG: 300 CAPSULE ORAL at 20:40

## 2022-08-24 RX ADMIN — CYCLOBENZAPRINE 10 MG: 10 TABLET, FILM COATED ORAL at 20:40

## 2022-08-24 RX ADMIN — CEFDINIR 300 MG: 300 CAPSULE ORAL at 08:44

## 2022-08-24 RX ADMIN — HYDROXYZINE HYDROCHLORIDE 50 MG: 50 TABLET ORAL at 08:43

## 2022-08-24 RX ADMIN — ONDANSETRON HYDROCHLORIDE 4 MG: 4 TABLET, FILM COATED ORAL at 02:38

## 2022-08-24 RX ADMIN — RISPERIDONE 0.25 MG: 0.25 TABLET, FILM COATED ORAL at 20:40

## 2022-08-24 RX ADMIN — IBUPROFEN 400 MG: 400 TABLET, FILM COATED ORAL at 23:09

## 2022-08-24 RX ADMIN — NICOTINE TRANSDERMAL SYSTEM 1 PATCH: 21 PATCH, EXTENDED RELEASE TRANSDERMAL at 08:42

## 2022-08-24 RX ADMIN — IBUPROFEN 400 MG: 400 TABLET, FILM COATED ORAL at 15:49

## 2022-08-24 RX ADMIN — PROMETHAZINE HYDROCHLORIDE 12.5 MG: 12.5 TABLET ORAL at 11:45

## 2022-08-25 LAB
CHOLEST SERPL-MCNC: 248 MG/DL (ref 0–200)
HDLC SERPL-MCNC: 58 MG/DL (ref 40–60)
LDLC SERPL CALC-MCNC: 170 MG/DL (ref 0–100)
LDLC/HDLC SERPL: 2.88 {RATIO}
TRIGL SERPL-MCNC: 115 MG/DL (ref 0–150)
VLDLC SERPL-MCNC: 20 MG/DL (ref 5–40)

## 2022-08-25 PROCEDURE — 80061 LIPID PANEL: CPT | Performed by: PSYCHIATRY & NEUROLOGY

## 2022-08-25 PROCEDURE — 99232 SBSQ HOSP IP/OBS MODERATE 35: CPT | Performed by: PSYCHIATRY & NEUROLOGY

## 2022-08-25 RX ORDER — OLANZAPINE 5 MG/1
2.5 TABLET ORAL 2 TIMES DAILY
Status: DISCONTINUED | OUTPATIENT
Start: 2022-08-25 | End: 2022-08-26 | Stop reason: HOSPADM

## 2022-08-25 RX ADMIN — CEFDINIR 300 MG: 300 CAPSULE ORAL at 20:08

## 2022-08-25 RX ADMIN — HYDROXYZINE HYDROCHLORIDE 50 MG: 50 TABLET ORAL at 15:57

## 2022-08-25 RX ADMIN — IBUPROFEN 400 MG: 400 TABLET, FILM COATED ORAL at 15:57

## 2022-08-25 RX ADMIN — RISPERIDONE 0.25 MG: 0.25 TABLET, FILM COATED ORAL at 08:02

## 2022-08-25 RX ADMIN — IBUPROFEN 400 MG: 400 TABLET, FILM COATED ORAL at 04:47

## 2022-08-25 RX ADMIN — CITALOPRAM HYDROBROMIDE 20 MG: 20 TABLET ORAL at 08:02

## 2022-08-25 RX ADMIN — OLANZAPINE 2.5 MG: 5 TABLET, FILM COATED ORAL at 11:21

## 2022-08-25 RX ADMIN — NICOTINE TRANSDERMAL SYSTEM 1 PATCH: 21 PATCH, EXTENDED RELEASE TRANSDERMAL at 08:01

## 2022-08-25 RX ADMIN — CEFDINIR 300 MG: 300 CAPSULE ORAL at 08:02

## 2022-08-26 VITALS
BODY MASS INDEX: 20.23 KG/M2 | TEMPERATURE: 98.3 F | DIASTOLIC BLOOD PRESSURE: 76 MMHG | RESPIRATION RATE: 18 BRPM | HEIGHT: 69 IN | SYSTOLIC BLOOD PRESSURE: 128 MMHG | HEART RATE: 108 BPM | OXYGEN SATURATION: 94 % | WEIGHT: 136.6 LBS

## 2022-08-26 PROBLEM — F15.20 METHAMPHETAMINE USE DISORDER, SEVERE (HCC): Status: ACTIVE | Noted: 2022-08-26

## 2022-08-26 PROBLEM — F11.20 OPIOID USE DISORDER, SEVERE, DEPENDENCE (HCC): Status: ACTIVE | Noted: 2022-08-26

## 2022-08-26 PROBLEM — N39.0 UTI (URINARY TRACT INFECTION): Status: ACTIVE | Noted: 2022-08-26

## 2022-08-26 PROBLEM — F12.20 TETRAHYDROCANNABINOL (THC) USE DISORDER, MODERATE, DEPENDENCE (HCC): Status: ACTIVE | Noted: 2022-08-26

## 2022-08-26 PROBLEM — F17.200 NICOTINE USE DISORDER: Status: ACTIVE | Noted: 2022-08-26

## 2022-08-26 PROBLEM — F32.A DEPRESSION: Status: ACTIVE | Noted: 2022-08-26

## 2022-08-26 PROBLEM — F19.959 PSYCHOACTIVE SUBSTANCE-INDUCED PSYCHOSIS (HCC): Status: ACTIVE | Noted: 2022-08-20

## 2022-08-26 PROCEDURE — 99238 HOSP IP/OBS DSCHRG MGMT 30/<: CPT | Performed by: PSYCHIATRY & NEUROLOGY

## 2022-08-26 RX ORDER — CEFDINIR 300 MG/1
300 CAPSULE ORAL EVERY 12 HOURS SCHEDULED
Qty: 1 CAPSULE | Refills: 0 | Status: SHIPPED | OUTPATIENT
Start: 2022-08-26 | End: 2022-08-27

## 2022-08-26 RX ORDER — CITALOPRAM 20 MG/1
20 TABLET ORAL DAILY
Qty: 30 TABLET | Refills: 0 | Status: SHIPPED | OUTPATIENT
Start: 2022-08-26

## 2022-08-26 RX ORDER — OLANZAPINE 5 MG/1
5 TABLET ORAL NIGHTLY
Qty: 30 TABLET | Refills: 0 | Status: SHIPPED | OUTPATIENT
Start: 2022-08-26

## 2022-08-26 RX ADMIN — NICOTINE TRANSDERMAL SYSTEM 1 PATCH: 21 PATCH, EXTENDED RELEASE TRANSDERMAL at 09:04

## 2022-08-26 RX ADMIN — CITALOPRAM HYDROBROMIDE 20 MG: 20 TABLET ORAL at 09:04

## 2022-08-26 RX ADMIN — CEFDINIR 300 MG: 300 CAPSULE ORAL at 09:04

## 2022-08-26 RX ADMIN — OLANZAPINE 2.5 MG: 5 TABLET, FILM COATED ORAL at 09:04

## 2022-11-29 ENCOUNTER — TRANSCRIBE ORDERS (OUTPATIENT)
Dept: ADMINISTRATIVE | Facility: HOSPITAL | Age: 52
End: 2022-11-29

## 2022-11-29 DIAGNOSIS — N94.10 PAIN IN FEMALE GENITALIA ON INTERCOURSE: ICD-10-CM

## 2022-11-29 DIAGNOSIS — R10.2 ADNEXAL TENDERNESS, RIGHT: Primary | ICD-10-CM

## 2024-07-03 ENCOUNTER — HOSPITAL ENCOUNTER (EMERGENCY)
Facility: HOSPITAL | Age: 54
Discharge: STILL ADMITTED OR ADMINISTRATIVE | DRG: 885 | End: 2024-07-03
Attending: EMERGENCY MEDICINE
Payer: MEDICAID

## 2024-07-03 ENCOUNTER — HOSPITAL ENCOUNTER (INPATIENT)
Facility: HOSPITAL | Age: 54
LOS: 5 days | Discharge: NURSING, REHAB OR LONG-TERM CARE | End: 2024-07-08
Attending: PSYCHIATRY & NEUROLOGY | Admitting: PSYCHIATRY & NEUROLOGY
Payer: MEDICAID

## 2024-07-03 VITALS
RESPIRATION RATE: 18 BRPM | OXYGEN SATURATION: 98 % | HEIGHT: 69 IN | SYSTOLIC BLOOD PRESSURE: 153 MMHG | WEIGHT: 136.69 LBS | HEART RATE: 49 BPM | BODY MASS INDEX: 20.24 KG/M2 | DIASTOLIC BLOOD PRESSURE: 93 MMHG | TEMPERATURE: 98.4 F

## 2024-07-03 DIAGNOSIS — F32.A DEPRESSION, UNSPECIFIED DEPRESSION TYPE: Primary | ICD-10-CM

## 2024-07-03 DIAGNOSIS — F11.20 OPIOID USE DISORDER, SEVERE, DEPENDENCE: Primary | ICD-10-CM

## 2024-07-03 DIAGNOSIS — F29 PSYCHOSIS, UNSPECIFIED PSYCHOSIS TYPE: ICD-10-CM

## 2024-07-03 PROBLEM — F22 PARANOID BEHAVIOR: Status: ACTIVE | Noted: 2024-07-03

## 2024-07-03 LAB
ALBUMIN SERPL-MCNC: 4.4 G/DL (ref 3.5–5.2)
ALBUMIN/GLOB SERPL: 1.4 G/DL
ALP SERPL-CCNC: 85 U/L (ref 39–117)
ALT SERPL W P-5'-P-CCNC: 21 U/L (ref 1–33)
AMPHET+METHAMPHET UR QL: POSITIVE
AMPHETAMINES UR QL: POSITIVE
ANION GAP SERPL CALCULATED.3IONS-SCNC: 14.1 MMOL/L (ref 5–15)
ANION GAP SERPL CALCULATED.3IONS-SCNC: 18.7 MMOL/L (ref 5–15)
AST SERPL-CCNC: 34 U/L (ref 1–32)
B-HCG UR QL: NEGATIVE
BACTERIA UR QL AUTO: ABNORMAL /HPF
BARBITURATES UR QL SCN: NEGATIVE
BASOPHILS # BLD AUTO: 0.07 10*3/MM3 (ref 0–0.2)
BASOPHILS NFR BLD AUTO: 0.9 % (ref 0–1.5)
BENZODIAZ UR QL SCN: POSITIVE
BILIRUB SERPL-MCNC: 0.9 MG/DL (ref 0–1.2)
BILIRUB UR QL STRIP: ABNORMAL
BUN SERPL-MCNC: 19 MG/DL (ref 6–20)
BUN SERPL-MCNC: 21 MG/DL (ref 6–20)
BUN/CREAT SERPL: 10.6 (ref 7–25)
BUN/CREAT SERPL: 12.3 (ref 7–25)
BUPRENORPHINE SERPL-MCNC: POSITIVE NG/ML
CALCIUM SPEC-SCNC: 10.4 MG/DL (ref 8.6–10.5)
CALCIUM SPEC-SCNC: 9.1 MG/DL (ref 8.6–10.5)
CANNABINOIDS SERPL QL: NEGATIVE
CHLORIDE SERPL-SCNC: 104 MMOL/L (ref 98–107)
CHLORIDE SERPL-SCNC: 98 MMOL/L (ref 98–107)
CLARITY UR: ABNORMAL
CO2 SERPL-SCNC: 18.9 MMOL/L (ref 22–29)
CO2 SERPL-SCNC: 21.3 MMOL/L (ref 22–29)
COCAINE UR QL: NEGATIVE
COLOR UR: ABNORMAL
CREAT SERPL-MCNC: 1.54 MG/DL (ref 0.57–1)
CREAT SERPL-MCNC: 1.98 MG/DL (ref 0.57–1)
DEPRECATED RDW RBC AUTO: 45.1 FL (ref 37–54)
EGFRCR SERPLBLD CKD-EPI 2021: 29.7 ML/MIN/1.73
EGFRCR SERPLBLD CKD-EPI 2021: 40.2 ML/MIN/1.73
EOSINOPHIL # BLD AUTO: 0.04 10*3/MM3 (ref 0–0.4)
EOSINOPHIL NFR BLD AUTO: 0.5 % (ref 0.3–6.2)
ERYTHROCYTE [DISTWIDTH] IN BLOOD BY AUTOMATED COUNT: 13 % (ref 12.3–15.4)
ETHANOL BLD-MCNC: <10 MG/DL (ref 0–10)
ETHANOL UR QL: <0.01 %
FENTANYL UR-MCNC: NEGATIVE NG/ML
GLOBULIN UR ELPH-MCNC: 3.2 GM/DL
GLUCOSE SERPL-MCNC: 106 MG/DL (ref 65–99)
GLUCOSE SERPL-MCNC: 89 MG/DL (ref 65–99)
GLUCOSE UR STRIP-MCNC: NEGATIVE MG/DL
HCT VFR BLD AUTO: 45.7 % (ref 34–46.6)
HGB BLD-MCNC: 14.8 G/DL (ref 12–15.9)
HGB UR QL STRIP.AUTO: ABNORMAL
HYALINE CASTS UR QL AUTO: ABNORMAL /LPF
IMM GRANULOCYTES # BLD AUTO: 0.02 10*3/MM3 (ref 0–0.05)
IMM GRANULOCYTES NFR BLD AUTO: 0.3 % (ref 0–0.5)
KETONES UR QL STRIP: ABNORMAL
LEUKOCYTE ESTERASE UR QL STRIP.AUTO: ABNORMAL
LYMPHOCYTES # BLD AUTO: 1.52 10*3/MM3 (ref 0.7–3.1)
LYMPHOCYTES NFR BLD AUTO: 19.8 % (ref 19.6–45.3)
MAGNESIUM SERPL-MCNC: 1.8 MG/DL (ref 1.6–2.6)
MCH RBC QN AUTO: 30.8 PG (ref 26.6–33)
MCHC RBC AUTO-ENTMCNC: 32.4 G/DL (ref 31.5–35.7)
MCV RBC AUTO: 95 FL (ref 79–97)
METHADONE UR QL SCN: NEGATIVE
MONOCYTES # BLD AUTO: 0.6 10*3/MM3 (ref 0.1–0.9)
MONOCYTES NFR BLD AUTO: 7.8 % (ref 5–12)
NEUTROPHILS NFR BLD AUTO: 5.44 10*3/MM3 (ref 1.7–7)
NEUTROPHILS NFR BLD AUTO: 70.7 % (ref 42.7–76)
NITRITE UR QL STRIP: POSITIVE
NRBC BLD AUTO-RTO: 0 /100 WBC (ref 0–0.2)
OPIATES UR QL: NEGATIVE
OXYCODONE UR QL SCN: NEGATIVE
PCP UR QL SCN: NEGATIVE
PH UR STRIP.AUTO: 5.5 [PH] (ref 5–8)
PLATELET # BLD AUTO: 243 10*3/MM3 (ref 140–450)
PMV BLD AUTO: 10.9 FL (ref 6–12)
POTASSIUM SERPL-SCNC: 3.4 MMOL/L (ref 3.5–5.2)
POTASSIUM SERPL-SCNC: 3.7 MMOL/L (ref 3.5–5.2)
PROT SERPL-MCNC: 7.6 G/DL (ref 6–8.5)
PROT UR QL STRIP: ABNORMAL
QT INTERVAL: 470 MS
QTC INTERVAL: 461 MS
RBC # BLD AUTO: 4.81 10*6/MM3 (ref 3.77–5.28)
RBC # UR STRIP: ABNORMAL /HPF
REF LAB TEST METHOD: ABNORMAL
SODIUM SERPL-SCNC: 137 MMOL/L (ref 136–145)
SODIUM SERPL-SCNC: 138 MMOL/L (ref 136–145)
SP GR UR STRIP: 1.02 (ref 1–1.03)
SQUAMOUS #/AREA URNS HPF: ABNORMAL /HPF
TRICYCLICS UR QL SCN: NEGATIVE
UROBILINOGEN UR QL STRIP: ABNORMAL
WBC # UR STRIP: ABNORMAL /HPF
WBC NRBC COR # BLD AUTO: 7.69 10*3/MM3 (ref 3.4–10.8)

## 2024-07-03 PROCEDURE — 93005 ELECTROCARDIOGRAM TRACING: CPT | Performed by: EMERGENCY MEDICINE

## 2024-07-03 PROCEDURE — 99285 EMERGENCY DEPT VISIT HI MDM: CPT

## 2024-07-03 PROCEDURE — 36415 COLL VENOUS BLD VENIPUNCTURE: CPT

## 2024-07-03 PROCEDURE — 96375 TX/PRO/DX INJ NEW DRUG ADDON: CPT

## 2024-07-03 PROCEDURE — P9612 CATHETERIZE FOR URINE SPEC: HCPCS

## 2024-07-03 PROCEDURE — 25010000002 CEFTRIAXONE PER 250 MG: Performed by: PHYSICIAN ASSISTANT

## 2024-07-03 PROCEDURE — 83735 ASSAY OF MAGNESIUM: CPT | Performed by: PHYSICIAN ASSISTANT

## 2024-07-03 PROCEDURE — 87086 URINE CULTURE/COLONY COUNT: CPT | Performed by: PHYSICIAN ASSISTANT

## 2024-07-03 PROCEDURE — 85025 COMPLETE CBC W/AUTO DIFF WBC: CPT | Performed by: PHYSICIAN ASSISTANT

## 2024-07-03 PROCEDURE — 25010000002 HALOPERIDOL LACTATE PER 5 MG: Performed by: EMERGENCY MEDICINE

## 2024-07-03 PROCEDURE — 96365 THER/PROPH/DIAG IV INF INIT: CPT

## 2024-07-03 PROCEDURE — 93010 ELECTROCARDIOGRAM REPORT: CPT | Performed by: INTERNAL MEDICINE

## 2024-07-03 PROCEDURE — 80307 DRUG TEST PRSMV CHEM ANLYZR: CPT | Performed by: PHYSICIAN ASSISTANT

## 2024-07-03 PROCEDURE — 80053 COMPREHEN METABOLIC PANEL: CPT | Performed by: PHYSICIAN ASSISTANT

## 2024-07-03 PROCEDURE — 25810000003 SODIUM CHLORIDE 0.9 % SOLUTION: Performed by: PHYSICIAN ASSISTANT

## 2024-07-03 PROCEDURE — 81025 URINE PREGNANCY TEST: CPT | Performed by: PHYSICIAN ASSISTANT

## 2024-07-03 PROCEDURE — 81001 URINALYSIS AUTO W/SCOPE: CPT | Performed by: PHYSICIAN ASSISTANT

## 2024-07-03 PROCEDURE — 87077 CULTURE AEROBIC IDENTIFY: CPT | Performed by: PHYSICIAN ASSISTANT

## 2024-07-03 PROCEDURE — 82077 ASSAY SPEC XCP UR&BREATH IA: CPT | Performed by: PHYSICIAN ASSISTANT

## 2024-07-03 PROCEDURE — 87186 SC STD MICRODIL/AGAR DIL: CPT | Performed by: PHYSICIAN ASSISTANT

## 2024-07-03 RX ORDER — NICOTINE 21 MG/24HR
1 PATCH, TRANSDERMAL 24 HOURS TRANSDERMAL
Status: DISCONTINUED | OUTPATIENT
Start: 2024-07-04 | End: 2024-07-05

## 2024-07-03 RX ORDER — TRAZODONE HYDROCHLORIDE 50 MG/1
50 TABLET ORAL NIGHTLY PRN
Status: DISCONTINUED | OUTPATIENT
Start: 2024-07-03 | End: 2024-07-08 | Stop reason: HOSPADM

## 2024-07-03 RX ORDER — BENZTROPINE MESYLATE 1 MG/ML
1 INJECTION INTRAMUSCULAR; INTRAVENOUS ONCE AS NEEDED
Status: DISCONTINUED | OUTPATIENT
Start: 2024-07-03 | End: 2024-07-08 | Stop reason: HOSPADM

## 2024-07-03 RX ORDER — FAMOTIDINE 20 MG/1
20 TABLET, FILM COATED ORAL 2 TIMES DAILY PRN
Status: DISCONTINUED | OUTPATIENT
Start: 2024-07-03 | End: 2024-07-08 | Stop reason: HOSPADM

## 2024-07-03 RX ORDER — BENZTROPINE MESYLATE 1 MG/1
2 TABLET ORAL ONCE AS NEEDED
Status: DISCONTINUED | OUTPATIENT
Start: 2024-07-03 | End: 2024-07-08 | Stop reason: HOSPADM

## 2024-07-03 RX ORDER — IBUPROFEN 400 MG/1
400 TABLET ORAL EVERY 6 HOURS PRN
Status: DISCONTINUED | OUTPATIENT
Start: 2024-07-03 | End: 2024-07-08 | Stop reason: HOSPADM

## 2024-07-03 RX ORDER — ALBUTEROL SULFATE 90 UG/1
2 AEROSOL, METERED RESPIRATORY (INHALATION) EVERY 4 HOURS PRN
Status: DISCONTINUED | OUTPATIENT
Start: 2024-07-03 | End: 2024-07-08 | Stop reason: HOSPADM

## 2024-07-03 RX ORDER — ECHINACEA PURPUREA EXTRACT 125 MG
2 TABLET ORAL AS NEEDED
Status: DISCONTINUED | OUTPATIENT
Start: 2024-07-03 | End: 2024-07-08 | Stop reason: HOSPADM

## 2024-07-03 RX ORDER — BENZONATATE 100 MG/1
100 CAPSULE ORAL 3 TIMES DAILY PRN
Status: DISCONTINUED | OUTPATIENT
Start: 2024-07-03 | End: 2024-07-08 | Stop reason: HOSPADM

## 2024-07-03 RX ORDER — HALOPERIDOL 5 MG/ML
5 INJECTION INTRAMUSCULAR ONCE
Status: COMPLETED | OUTPATIENT
Start: 2024-07-03 | End: 2024-07-03

## 2024-07-03 RX ORDER — LORAZEPAM 2 MG/1
2 TABLET ORAL ONCE
Status: COMPLETED | OUTPATIENT
Start: 2024-07-03 | End: 2024-07-03

## 2024-07-03 RX ORDER — HYDROXYZINE 50 MG/1
50 TABLET, FILM COATED ORAL EVERY 6 HOURS PRN
Status: DISCONTINUED | OUTPATIENT
Start: 2024-07-03 | End: 2024-07-05

## 2024-07-03 RX ORDER — CLONIDINE HYDROCHLORIDE 0.1 MG/1
0.2 TABLET ORAL ONCE
Status: COMPLETED | OUTPATIENT
Start: 2024-07-03 | End: 2024-07-03

## 2024-07-03 RX ORDER — HALOPERIDOL 5 MG/ML
5 INJECTION INTRAMUSCULAR ONCE
Status: DISCONTINUED | OUTPATIENT
Start: 2024-07-03 | End: 2024-07-03

## 2024-07-03 RX ORDER — ACETAMINOPHEN 325 MG/1
650 TABLET ORAL EVERY 6 HOURS PRN
Status: DISCONTINUED | OUTPATIENT
Start: 2024-07-03 | End: 2024-07-08 | Stop reason: HOSPADM

## 2024-07-03 RX ORDER — ALUMINA, MAGNESIA, AND SIMETHICONE 2400; 2400; 240 MG/30ML; MG/30ML; MG/30ML
15 SUSPENSION ORAL EVERY 6 HOURS PRN
Status: DISCONTINUED | OUTPATIENT
Start: 2024-07-03 | End: 2024-07-08 | Stop reason: HOSPADM

## 2024-07-03 RX ORDER — ONDANSETRON 4 MG/1
4 TABLET, ORALLY DISINTEGRATING ORAL EVERY 6 HOURS PRN
Status: DISCONTINUED | OUTPATIENT
Start: 2024-07-03 | End: 2024-07-08 | Stop reason: HOSPADM

## 2024-07-03 RX ORDER — SODIUM CHLORIDE 0.9 % (FLUSH) 0.9 %
10 SYRINGE (ML) INJECTION AS NEEDED
Status: DISCONTINUED | OUTPATIENT
Start: 2024-07-03 | End: 2024-07-03 | Stop reason: HOSPADM

## 2024-07-03 RX ORDER — LOPERAMIDE HYDROCHLORIDE 2 MG/1
2 CAPSULE ORAL
Status: DISCONTINUED | OUTPATIENT
Start: 2024-07-03 | End: 2024-07-08 | Stop reason: HOSPADM

## 2024-07-03 RX ADMIN — HALOPERIDOL LACTATE 5 MG: 5 INJECTION, SOLUTION INTRAMUSCULAR at 14:17

## 2024-07-03 RX ADMIN — SODIUM CHLORIDE 1000 ML: 9 INJECTION, SOLUTION INTRAVENOUS at 17:59

## 2024-07-03 RX ADMIN — SODIUM CHLORIDE 1000 ML: 9 INJECTION, SOLUTION INTRAVENOUS at 11:58

## 2024-07-03 RX ADMIN — CEFTRIAXONE 1000 MG: 1 INJECTION, POWDER, FOR SOLUTION INTRAMUSCULAR; INTRAVENOUS at 16:43

## 2024-07-03 RX ADMIN — SODIUM CHLORIDE 1000 ML: 9 INJECTION, SOLUTION INTRAVENOUS at 15:38

## 2024-07-03 RX ADMIN — CLONIDINE HYDROCHLORIDE 0.2 MG: 0.1 TABLET ORAL at 17:10

## 2024-07-03 RX ADMIN — LORAZEPAM 2 MG: 2 TABLET ORAL at 12:25

## 2024-07-03 NOTE — ED NOTES
Pt is resting on stretcher with eyes closed and RR WDL. RN is holding medication administration until patient is able to tolerate PO medication and is awake.

## 2024-07-03 NOTE — ED PROVIDER NOTES
Subjective   History of Present Illness  Pt is very upset and states that her family is dead.   Pt is acting bizarre and police brought her here to ER     History provided by:  Patient   used: No    Altered Mental Status  Presenting symptoms: confusion and disorientation    Severity:  Moderate  Episode history:  Unable to specify  Duration:  2 days  Timing:  Constant  Progression:  Worsening  Chronicity:  Recurrent  Context: drug use    Associated symptoms: agitation, depression and hallucinations        Review of Systems   Psychiatric/Behavioral:  Positive for agitation, confusion and hallucinations.        Past Medical History:   Diagnosis Date    Alcoholic     Anxiety and depression     Asthma     Bradycardia     Dyspnea     Hx of sepsis 10/2016    Hypothyroid     Osteoarthritis     PVC (premature ventricular contraction)     RA (rheumatoid arthritis)     Ulcer of abdomen wall        Allergies   Allergen Reactions    Antihistamines, Diphenhydramine-Type Hives and Mental Status Change    Benadryl [Diphenhydramine] Hives and Mental Status Change    Codeine Nausea And Vomiting    Hydrocodone Nausea Only       Past Surgical History:   Procedure Laterality Date    CARDIAC CATHETERIZATION N/A 2017    Procedure: Left Heart Cath;  Surgeon: Don Clifford MD;  Location:  [facility];  Service:      SECTION      x 2    CHOLECYSTECTOMY      ENDOMETRIAL ABLATION      FRACTURE SURGERY Left     knee     KNEE CARTILAGE SURGERY Left     LAPAROSCOPIC CHOLECYSTECTOMY      LAPAROSCOPIC TUBAL LIGATION      LUMBAR LAMINECTOMY DISCECTOMY DECOMPRESSION Left 2021    Procedure: LUMBAR LAMINECTOMY L4-5;  Surgeon: John Bullock MD;  Location: [facility] OR;  Service: Neurosurgery;  Laterality: Left;    WRIST SURGERY Left     hardware present       Family History   Problem Relation Age of Onset    Cancer Mother     Arthritis Mother     Bradycardia Mother     Other Mother          "Tachycardia     Arrhythmia Mother     Cirrhosis Father        Social History     Socioeconomic History    Marital status: Single    Number of children: 4    Highest education level: Associate degree: academic program   Tobacco Use    Smoking status: Every Day     Current packs/day: 0.50     Average packs/day: 0.5 packs/day for 30.0 years (15.0 ttl pk-yrs)     Types: Cigarettes    Smokeless tobacco: Never    Tobacco comments:     Trying to quit, smoking 6 cigarettes per day.    Vaping Use    Vaping status: Every Day    Substances: Nicotine, THC    Devices: Disposable   Substance and Sexual Activity    Alcohol use: Not Currently     Comment: quit 12 years ago    Drug use: Yes     Types: Methamphetamines, Oxycodone, Marijuana, Amphetamines, Benzodiazepines     Comment: Suboxone 2 strips \"I never use this but I did this morning.\"    Sexual activity: Not Currently           Objective   Physical Exam  Vitals and nursing note reviewed.   Constitutional:       Appearance: She is well-developed.   HENT:      Head: Normocephalic.   Cardiovascular:      Rate and Rhythm: Normal rate and regular rhythm.   Pulmonary:      Effort: Pulmonary effort is normal.      Breath sounds: Normal breath sounds.   Abdominal:      General: Bowel sounds are normal.      Palpations: Abdomen is soft.      Tenderness: There is no abdominal tenderness.   Musculoskeletal:         General: Normal range of motion.      Cervical back: Neck supple.   Skin:     General: Skin is warm and dry.   Neurological:      Mental Status: She is alert and oriented to person, place, and time.   Psychiatric:         Mood and Affect: Mood is anxious and depressed.         Behavior: Behavior normal.         Thought Content: Thought content normal.         Judgment: Judgment is impulsive and inappropriate.         Procedures           ED Course  ED Course as of 07/07/24 2221 Wed Jul 03, 2024   1628 ECG 12 Lead QT Measurement  Vent. Rate :  58 BPM     Atrial Rate :  58 " BPM     P-R Int : 114 ms          QRS Dur :  88 ms      QT Int : 470 ms       P-R-T Axes :  50  72  74 degrees     QTc Int : 461 ms     Sinus bradycardia  Minimal voltage criteria for LVH, may be normal variant  T wave abnormality, consider anterior ischemia  Abnormal ECG  When compared with ECG of 20-AUG-2022 17:04,  Vent. rate has increased BY  19 BPM   [ES]      ED Course User Index  [ES] Umesh Zavaleta MD      Results for orders placed or performed during the hospital encounter of 07/03/24   Urine Culture - Urine, Straight Cath    Specimen: Straight Cath; Urine   Result Value Ref Range    Urine Culture 50,000 CFU/mL Escherichia coli (A)        Susceptibility    Escherichia coli - CESAR     Amoxicillin + Clavulanate  Susceptible ug/ml     Ampicillin  Resistant ug/ml     Ampicillin + Sulbactam  Intermediate ug/ml     Cefazolin  Susceptible ug/ml     Cefepime  Susceptible ug/ml     Ceftazidime  Susceptible ug/ml     Ceftriaxone  Susceptible ug/ml     Gentamicin  Susceptible ug/ml     Levofloxacin  Resistant ug/ml     Nitrofurantoin  Susceptible ug/ml     Piperacillin + Tazobactam  Susceptible ug/ml     Trimethoprim + Sulfamethoxazole  Resistant ug/ml   Magnesium    Specimen: Blood   Result Value Ref Range    Magnesium 1.8 1.6 - 2.6 mg/dL   Comprehensive Metabolic Panel    Specimen: Blood   Result Value Ref Range    Glucose 106 (H) 65 - 99 mg/dL    BUN 21 (H) 6 - 20 mg/dL    Creatinine 1.98 (H) 0.57 - 1.00 mg/dL    Sodium 138 136 - 145 mmol/L    Potassium 3.4 (L) 3.5 - 5.2 mmol/L    Chloride 98 98 - 107 mmol/L    CO2 21.3 (L) 22.0 - 29.0 mmol/L    Calcium 10.4 8.6 - 10.5 mg/dL    Total Protein 7.6 6.0 - 8.5 g/dL    Albumin 4.4 3.5 - 5.2 g/dL    ALT (SGPT) 21 1 - 33 U/L    AST (SGOT) 34 (H) 1 - 32 U/L    Alkaline Phosphatase 85 39 - 117 U/L    Total Bilirubin 0.9 0.0 - 1.2 mg/dL    Globulin 3.2 gm/dL    A/G Ratio 1.4 g/dL    BUN/Creatinine Ratio 10.6 7.0 - 25.0    Anion Gap 18.7 (H) 5.0 - 15.0 mmol/L     eGFR 29.7 (L) >60.0 mL/min/1.73   Ethanol    Specimen: Blood   Result Value Ref Range    Ethanol <10 0 - 10 mg/dL    Ethanol % <0.010 %   Urine Drug Screen - Urine, Clean Catch    Specimen: Urine, Clean Catch   Result Value Ref Range    THC, Screen, Urine Negative Negative    Phencyclidine (PCP), Urine Negative Negative    Cocaine Screen, Urine Negative Negative    Methamphetamine, Ur Positive (A) Negative    Opiate Screen Negative Negative    Amphetamine Screen, Urine Positive (A) Negative    Benzodiazepine Screen, Urine Positive (A) Negative    Tricyclic Antidepressants Screen Negative Negative    Methadone Screen, Urine Negative Negative    Barbiturates Screen, Urine Negative Negative    Oxycodone Screen, Urine Negative Negative    Buprenorphine, Screen, Urine Positive (A) Negative   Pregnancy, Urine - Urine, Clean Catch    Specimen: Urine, Clean Catch   Result Value Ref Range    HCG, Urine QL Negative Negative   Urinalysis With Culture If Indicated - Straight Cath    Specimen: Straight Cath; Urine   Result Value Ref Range    Color, UA Dark Yellow (A) Yellow, Straw    Appearance, UA Cloudy (A) Clear    pH, UA 5.5 5.0 - 8.0    Specific Gravity, UA 1.024 1.005 - 1.030    Glucose, UA Negative Negative    Ketones, UA 40 mg/dL (2+) (A) Negative    Bilirubin, UA Small (1+) (A) Negative    Blood, UA Small (1+) (A) Negative    Protein,  mg/dL (2+) (A) Negative    Leuk Esterase, UA Moderate (2+) (A) Negative    Nitrite, UA Positive (A) Negative    Urobilinogen, UA 1.0 E.U./dL 0.2 - 1.0 E.U./dL   CBC Auto Differential    Specimen: Blood   Result Value Ref Range    WBC 7.69 3.40 - 10.80 10*3/mm3    RBC 4.81 3.77 - 5.28 10*6/mm3    Hemoglobin 14.8 12.0 - 15.9 g/dL    Hematocrit 45.7 34.0 - 46.6 %    MCV 95.0 79.0 - 97.0 fL    MCH 30.8 26.6 - 33.0 pg    MCHC 32.4 31.5 - 35.7 g/dL    RDW 13.0 12.3 - 15.4 %    RDW-SD 45.1 37.0 - 54.0 fl    MPV 10.9 6.0 - 12.0 fL    Platelets 243 140 - 450 10*3/mm3    Neutrophil % 70.7  42.7 - 76.0 %    Lymphocyte % 19.8 19.6 - 45.3 %    Monocyte % 7.8 5.0 - 12.0 %    Eosinophil % 0.5 0.3 - 6.2 %    Basophil % 0.9 0.0 - 1.5 %    Immature Grans % 0.3 0.0 - 0.5 %    Neutrophils, Absolute 5.44 1.70 - 7.00 10*3/mm3    Lymphocytes, Absolute 1.52 0.70 - 3.10 10*3/mm3    Monocytes, Absolute 0.60 0.10 - 0.90 10*3/mm3    Eosinophils, Absolute 0.04 0.00 - 0.40 10*3/mm3    Basophils, Absolute 0.07 0.00 - 0.20 10*3/mm3    Immature Grans, Absolute 0.02 0.00 - 0.05 10*3/mm3    nRBC 0.0 0.0 - 0.2 /100 WBC   Fentanyl, Urine - Urine, Clean Catch    Specimen: Urine, Clean Catch   Result Value Ref Range    Fentanyl, Urine Negative Negative   Urinalysis, Microscopic Only - Straight Cath    Specimen: Straight Cath; Urine   Result Value Ref Range    RBC, UA 11-20 (A) None Seen, 0-2 /HPF    WBC, UA Too Numerous to Count (A) None Seen, 0-2 /HPF    Bacteria, UA 2+ (A) None Seen /HPF    Squamous Epithelial Cells, UA 0-2 None Seen, 0-2 /HPF    Hyaline Casts, UA 13-20 None Seen /LPF    Methodology Automated Microscopy    Basic Metabolic Panel    Specimen: Blood   Result Value Ref Range    Glucose 89 65 - 99 mg/dL    BUN 19 6 - 20 mg/dL    Creatinine 1.54 (H) 0.57 - 1.00 mg/dL    Sodium 137 136 - 145 mmol/L    Potassium 3.7 3.5 - 5.2 mmol/L    Chloride 104 98 - 107 mmol/L    CO2 18.9 (L) 22.0 - 29.0 mmol/L    Calcium 9.1 8.6 - 10.5 mg/dL    BUN/Creatinine Ratio 12.3 7.0 - 25.0    Anion Gap 14.1 5.0 - 15.0 mmol/L    eGFR 40.2 (L) >60.0 mL/min/1.73   ECG 12 Lead QT Measurement   Result Value Ref Range    QT Interval 470 ms    QTC Interval 461 ms                                           Medical Decision Making  Amount and/or Complexity of Data Reviewed  Labs: ordered.  ECG/medicine tests: ordered. Decision-making details documented in ED Course.    Risk  Prescription drug management.        Final diagnoses:   Depression, unspecified depression type   Psychosis, unspecified psychosis type       ED Disposition  ED Disposition        ED Disposition   DC/Transfer to Behavioral Health    Condition   Stable    Comment   --               No follow-up provider specified.       Medication List      No changes were made to your prescriptions during this visit.            Shanique Jiang PA  07/07/24 [id number]

## 2024-07-03 NOTE — ED NOTES
Attempted to stick patient at this time to obtain IV access, attempted once to left AC without success. Attempted to stick patient again, patient refuses, states that she wants to leave. Patient is alert and oriented x4, but has bizarre thoughts at this time, provider notified.

## 2024-07-04 PROBLEM — F29 PSYCHOSIS: Status: ACTIVE | Noted: 2024-07-03

## 2024-07-04 PROBLEM — F10.20 ALCOHOL USE DISORDER, SEVERE, DEPENDENCE: Status: ACTIVE | Noted: 2024-07-04

## 2024-07-04 LAB
HAV IGM SERPL QL IA: ABNORMAL
HBV CORE IGM SERPL QL IA: ABNORMAL
HBV SURFACE AG SERPL QL IA: ABNORMAL
HCV AB SER QL: REACTIVE

## 2024-07-04 PROCEDURE — 99223 1ST HOSP IP/OBS HIGH 75: CPT | Performed by: PSYCHIATRY & NEUROLOGY

## 2024-07-04 PROCEDURE — 80074 ACUTE HEPATITIS PANEL: CPT | Performed by: PSYCHIATRY & NEUROLOGY

## 2024-07-04 RX ORDER — NITROFURANTOIN 25; 75 MG/1; MG/1
100 CAPSULE ORAL EVERY 12 HOURS SCHEDULED
Status: DISPENSED | OUTPATIENT
Start: 2024-07-04 | End: 2024-07-07

## 2024-07-04 RX ORDER — OLANZAPINE 5 MG/1
5 TABLET ORAL NIGHTLY
Status: DISCONTINUED | OUTPATIENT
Start: 2024-07-04 | End: 2024-07-08 | Stop reason: HOSPADM

## 2024-07-04 RX ORDER — BUPRENORPHINE HYDROCHLORIDE AND NALOXONE HYDROCHLORIDE DIHYDRATE 8; 2 MG/1; MG/1
1 TABLET SUBLINGUAL DAILY
Status: DISCONTINUED | OUTPATIENT
Start: 2024-07-04 | End: 2024-07-08 | Stop reason: HOSPADM

## 2024-07-04 RX ORDER — ESCITALOPRAM OXALATE 10 MG/1
10 TABLET ORAL DAILY
Status: DISCONTINUED | OUTPATIENT
Start: 2024-07-04 | End: 2024-07-08 | Stop reason: HOSPADM

## 2024-07-04 RX ADMIN — OLANZAPINE 5 MG: 5 TABLET, FILM COATED ORAL at 20:54

## 2024-07-04 RX ADMIN — Medication 1 PATCH: at 08:26

## 2024-07-04 RX ADMIN — NITROFURANTOIN MONOHYDRATE/MACROCRYSTALLINE 100 MG: 25; 75 CAPSULE ORAL at 20:54

## 2024-07-04 RX ADMIN — BUPRENORPHINE HYDROCHLORIDE AND NALOXONE HYDROCHLORIDE DIHYDRATE 1 TABLET: 8; 2 TABLET SUBLINGUAL at 14:09

## 2024-07-04 RX ADMIN — NITROFURANTOIN MONOHYDRATE/MACROCRYSTALLINE 100 MG: 25; 75 CAPSULE ORAL at 14:10

## 2024-07-04 RX ADMIN — ESCITALOPRAM 10 MG: 10 TABLET, FILM COATED ORAL at 14:10

## 2024-07-04 NOTE — PLAN OF CARE
Goal Outcome Evaluation:  Patient Agreement with Plan of Care: agrees  Consent Given to Review Plan with: No one  Progress: no change  Outcome Evaluation: Therapist met with Patient to review car eplan, social history, and aftercare recommendations; Patient refused.        Problem: Adult Behavioral Health Plan of Care  Goal: Plan of Care Review  Outcome: Ongoing, Progressing  Flowsheets  Taken 7/4/2024 0927 by Aida Matta MSW  Consent Given to Review Plan with: No one  Progress: no change  Patient Agreement with Plan of Care: agrees  Outcome Evaluation:   Therapist met with Patient to review car eplan, social history, and aftercare recommendations   Patient refused.  Taken 7/4/2024 0850 by Amie Lanier RN  Plan of Care Reviewed With: patient  Goal: Patient-Specific Goal (Individualization)  Outcome: Ongoing, Progressing  Flowsheets  Taken 7/4/2024 0927 by Aida Matta MSW  Patient-Specific Goals (Include Timeframe): Identify 2-3 coping skills, address relapse prevention measures, complete aftercare plans, and dney SI/HI prior to discharge.  Individualized Care Needs: Therapist to offer 1-4 therapy sessions, aftercare planning, safety planning, family education, group therapy, and brief CBT/MI interventions.  Taken 7/4/2024 0922 by Aida Matta MSW  Patient Personal Strengths:   self-reliant   resilient   resourceful  Patient Vulnerabilities:   substance abuse/addiction   poor impulse control   limited social skills  Taken 7/3/2024 2221 by David De, RN  Anxieties, Fears or Concerns: none voiced  Goal: Optimized Coping Skills in Response to Life Stressors  Outcome: Ongoing, Progressing  Flowsheets (Taken 7/4/2024 0927)  Optimized Coping Skills in Response to Life Stressors: making progress toward outcome  Intervention: Promote Effective Coping Strategies  Flowsheets (Taken 7/4/2024 0927)  Supportive Measures:   active listening utilized   counseling provided   goal-setting  facilitated   verbalization of feelings encouraged  Goal: Develops/Participates in Therapeutic [place] to Support Successful Transition  Outcome: Ongoing, Progressing  Flowsheets (Taken 7/4/2024 0927)  Develops/Participates in Therapeutic [place] to Support Successful Transition: making progress toward outcome  Intervention: Foster Therapeutic [place]  Flowsheets (Taken 7/4/2024 0927)  Trust Relationship/Rapport:   care explained   questions encouraged   choices provided   reassurance provided   emotional support provided   empathic listening provided   thoughts/feelings acknowledged   questions answered  Intervention: Mutually Develop Transition Plan  Flowsheets  Taken 7/4/2024 0927 by Aida Matta MSW  Outpatient/Agency/Support Group Needs:   residential services   outpatient medication management   outpatient psychiatric care (specify)  Discharge Coordination/Progress:   Therapist met with Patient to complete discharge needs assessment   Patient agreeable.  Transition Support: community resources reviewed  Anticipated Discharge Disposition:   residential substance use unit   home with family  Transportation Concerns: no car  Current Discharge Risk:   substance use/abuse   psychiatric illness  Concerns to be Addressed:   coping/stress   mental health  Readmission Within the Last 30 Days: no previous admission in last 30 days  Patient's Choice of Community Agency(s): unable to assess  Offered/Gave Vendor List: no  Taken 7/3/2024 2235 by David De, RN  Transportation Anticipated: family or friend will provide  Patient/Family Anticipated Services at Transition:      mental health services   outpatient care   rehabilitation services  Patient/Family Anticipates Transition to: inpatient rehabilitation facility     DATA: Therapist met individually with patient this date to introduce role and to discuss hospitalization expectations. Patient was not agreeable to complete assessment today due to  not feeling well. She is agreeable to speak with me more tomorrow. Assessment information was gathered from the chart.     Patient declines family involvement at this time despite encouragement.      Clinical Maneuvering/Intervention:     Therapist assisted patient in processing session content; acknowledged and normalized patient’s thoughts, feelings, and concerns.  Discussed the therapist/patient relationship and explain the parameters and limitations of relative confidentiality.  Also discussed the importance of active participation, and honesty to the treatment process.  Encouraged the patient to discuss/vent their feelings, frustrations, and fears concerning their ongoing medical issues and validated their feelings.     Discussed the importance of finding enjoyable activities and coping skills that the patient can engage in a regular basis. Discussed healthy coping skills such as distraction, self love, grounding, thought challenges/reframing, etc.  Provided patient with list of healthy coping skills this date. Discussed the importance of medication compliance.  Praised the patient for seeking help and spent the majority of the session building rapport.       Allowed patient to freely discuss issues without interruption or judgment. Provided safe, confidential environment to facilitate the development of positive therapeutic relationship and encourage open, honest communication.      Therapist addressed discharge safety planning this date. Assisted patient in identifying risk factors which would indicate the need for higher level of care after discharge;  including thoughts to harm self or others and/or self-harming behavior. Encouraged patient to call 911, or present to the nearest emergency room should any of these events occur. Discussed crisis intervention services and means to access.  Encouraged securing any objects of harm.       Therapist completed integrated summary, treatment plan, and initiated  social history this date.  Therapist is strongly encouraging family involvement in treatment.       ASSESSMENT: Ernesto Stone is a 54 year old  female living in [facility]. Her living situation is unclear at this time. She has some college education and appears to be unemployed. Patient was admitted due to concern for psychosis. Patient was brought in after being found hallucinating in front of a business. Patient voiced paranoid thoughts about people being after her and trying to kill her to intake staff. Patient's UDS is positive for meth, buprenorphine, and benzos. Her last admission here was in 2022 and was also due to psychosis and substance use. This therapist will encourage residential substance use treatment.      PLAN:       Patient to remain hospitalized this date.     Treatment team will focus efforts on stabilizing patient's acute symptoms while providing education on healthy coping and crisis management to reduce hospitalizations.   Patient requires daily psychiatrist evaluation and 24/7 nursing supervision to promote patient  safety.     Therapist will offer 1-4 individual sessions, 1 therapy group daily, family education, and appropriate referral.    Therapist recommends residential [facility] program.

## 2024-07-04 NOTE — NURSING NOTE
Assigned to room 17B  
Called report to floor  
Patient is asking to leave and for her clothes, spoke to Dr. Granger. Patient will be placed on a 72 hour hold if she attempts to leave.  
Patient is in ER room 4 at this time, getting fluids and antibiotics. Hand off given to ALECIA Morfin.  
Patient presented to intake after being found hallucinating in front of a business. Patient reports that she is concerned about her family, stating that someone was going to hurt them because she was a . She reports that someone is trying to kill her as well. She reports using meth for the first time 2 days, when this all began. Patient is alert and oriented, and denying SI/HI/AVH. She does report worsening depression and anxiety due to the loss of her mother recently, which lead to the substance abuse in the first place. Patient reports that she is currently okay with admission at time of assessment to try to get help dealing with these emotions.     Creatine 1.54  CO2 18.9  eGFR 40.2  BUN 21  Creatinine 1.98  Potassium 3.4  AST 34  Anion Gap 18.7    UDS positive for meth, amphetamines, suboxone and benzos, given ativan in ED.      
Pt. Presents to intake. Search completed per policy by two staff members. Patient placed in safe room for evaluation, waiting on ER provider to treat medically.   
Spoke to Dr. Granger, discussed assessment and labs, new orders to admit the patient to adult with routine orders SP3.  [id number]   
14

## 2024-07-04 NOTE — NURSING NOTE
Pt asked x2 to come up to med room to take her medications, and pt did not come to take her medicines. Rn made aware

## 2024-07-04 NOTE — PLAN OF CARE
Goal Outcome Evaluation:  Plan of Care Reviewed With: patient  Patient Agreement with Plan of Care: agrees     Progress: no change  Outcome Evaluation: Patient has been withdrawn to her room the majority of the shift. Rates anxiety a 10 and depression a 8. Denies SI/HI or AVH. Patient was encouraged to participate in groups and interact with peers.

## 2024-07-04 NOTE — H&P
INITIAL PSYCHIATRIC HISTORY & PHYSICAL    Patient Identification:  Name:  Ernesto Stone  Age:  54 y.o.  Sex:  female  :  1970  MRN:  [id number]   Visit Number:  [id number]  Primary Care Physician:  Sigifredo Jacques MD    SUBJECTIVE    CC/Focus of Exam: Psychosis    HPI: Ernesto Stone is a 54 y.o. female who was admitted on 7/3/2024 with complaints of hallucinations and bizarre behaviors. She was found wandering and hallucinating in front of a business in [place] and law was called on her. She verbalized paranoid thoughts about people being after her and trying to kill her and she claimed she was being investigated by two FBI agents because her youngest daughter in detention for trafficking and her ex- is in federal [facility] for conspiracy to sell narcotics and she believes he called the daughter and a week later she was arrested and now he is trying to call the patient and patient is blocking his calls. She also mentioned to the ED intake staff that it started after she used meth two days prior to being brought to the hospital.     PAST PSYCHIATRIC HX: The patient has had at least two inpatient psych treatments here in the past. Last one was in 2022 and was treated for psychoactive induced psychosis as she was using methamphetamine at that time. She was also depressed due to recent breakup.     SUBSTANCE USE HX: The patient has a history of alcohol, opioid, methamphetamine and thc use disorders. She admits to using meth via smoking about 3 days ago. She states she is on MAT with Suboxone but has not been to her appt because her insurance ran out and her last use was two days ago. She wants to continue her Suboxone and she states she plans to go back to her clinic. She denies any recent alcohol use and reports her last use was six months ago.    SOCIAL HX:   Social History     Socioeconomic History    Marital status: Single    Number of children: 4    Highest education level:  "Associate degree: academic program   Tobacco Use    Smoking status: Every Day     Current packs/day: 0.50     Average packs/day: 0.5 packs/day for 30.0 years (15.0 ttl pk-yrs)     Types: Cigarettes    Smokeless tobacco: Never    Tobacco comments:     Trying to quit, smoking 6 cigarettes per day.    Vaping Use    Vaping status: Every Day    Substances: Nicotine, THC    Devices: Disposable   Substance and Sexual Activity    Alcohol use: Not Currently     Comment: quit 12 years ago    Drug use: Yes     Types: Methamphetamines, Oxycodone, Marijuana, Amphetamines, Benzodiazepines     Comment: Suboxone 2 strips \"I never use this but I did this morning.\"    Sexual activity: Not Currently         Past Medical History:   Diagnosis Date    Alcoholic     Anxiety and depression     Asthma     Bradycardia     Dyspnea     Hx of sepsis 10/2016    Hypothyroid     Osteoarthritis     PVC (premature ventricular contraction)     RA (rheumatoid arthritis)     Ulcer of abdomen wall           Past Surgical History:   Procedure Laterality Date    CARDIAC CATHETERIZATION N/A 2017    Procedure: Left Heart Cath;  Surgeon: Don Clifford MD;  Location:  [facility];  Service:      SECTION      x 2    CHOLECYSTECTOMY      ENDOMETRIAL ABLATION      FRACTURE SURGERY Left     knee     KNEE CARTILAGE SURGERY Left     LAPAROSCOPIC CHOLECYSTECTOMY      LAPAROSCOPIC TUBAL LIGATION      LUMBAR LAMINECTOMY DISCECTOMY DECOMPRESSION Left 2021    Procedure: LUMBAR LAMINECTOMY L4-5;  Surgeon: John Bullock MD;  Location: [facility] OR;  Service: Neurosurgery;  Laterality: Left;    WRIST SURGERY Left     hardware present       Family History   Problem Relation Age of Onset    Cancer Mother     Arthritis Mother     Bradycardia Mother     Other Mother         Tachycardia     Arrhythmia Mother     Cirrhosis Father          Medications Prior to Admission   Medication Sig Dispense Refill Last Dose    albuterol (PROVENTIL " HFA;VENTOLIN HFA) 108 (90 BASE) MCG/ACT inhaler Inhale 2 puffs Every 4 (Four) Hours As Needed for Wheezing. Indications: Asthma            ALLERGIES:  Antihistamines, diphenhydramine-type; Benadryl [diphenhydramine]; Codeine; and Hydrocodone    Temp:  [97.2 °F (36.2 °C)-98.6 °F (37 °C)] 98.6 °F (37 °C)  Heart Rate:  [48-66] 58  Resp:  [16-18] 18  BP: (102-182)/() 125/68    REVIEW OF SYSTEMS:  Review of Systems   Constitutional:  Positive for chills, diaphoresis and fatigue.   HENT: Negative.     Eyes: Negative.    Respiratory: Negative.     Cardiovascular: Negative.    Gastrointestinal: Negative.    Endocrine: Negative.    Genitourinary: Negative.    Musculoskeletal:  Positive for myalgias.   Skin: Negative.    Allergic/Immunologic: Negative.    Neurological:  Positive for weakness.   Hematological: Negative.    Psychiatric/Behavioral:  Positive for confusion, dysphoric mood and hallucinations. The patient is nervous/anxious.         OBJECTIVE    PHYSICAL EXAM:  Physical Exam  Constitutional:  Appears well-developed and well-nourished.   HENT:   Head: Normocephalic and atraumatic.   Right Ear: External ear normal.   Left Ear: External ear normal.   Mouth/Throat: Oropharynx is clear and moist.   Eyes: Pupils are equal, round, and reactive to light. Conjunctivae and EOM are normal.   Neck: Normal range of motion. Neck supple.   Cardiovascular: Normal rate, regular rhythm and normal heart sounds.    Respiratory: Effort normal and breath sounds normal. No respiratory distress. No wheezes.   GI: Soft. Bowel sounds are normal.No distension. There is no tenderness.   Musculoskeletal: Normal range of motion. No edema or deformity.   Neurological:No cranial nerve deficit. Coordination normal.   Skin: Skin is warm and dry. No rash noted. No erythema.     MENTAL STATUS EXAM:   Hygiene:   poor  Cooperation:  Evasive  Eye Contact:  Fair  Psychomotor Behavior:  Slow  Affect:  Restricted  Hopelessness: 6  Speech:   Normal  Thought Progress: Goal directed  Thought Content:  Bizarre  Suicidal:  None  Homicidal:  None  Hallucinations:  Auditory  Delusion:  Paranoid  Memory:  Deficits  Orientation:  Person, Place, Time, and Situation  Reliability:  poor  Insight:  Poor  Judgement:  Poor  Impulse Control:  Poor    Imaging Results (Last 24 Hours)       ** No results found for the last 24 hours. **             ECG/EMG Results (most recent)       None             Lab Results   Component Value Date    GLUCOSE 89 07/03/2024    BUN 19 07/03/2024    CREATININE 1.54 (H) 07/03/2024    EGFRIFNONA 66 10/27/2021    BCR 12.3 07/03/2024    CO2 18.9 (L) 07/03/2024    CALCIUM 9.1 07/03/2024    ALBUMIN 4.4 07/03/2024    LABIL2 1.4 (L) 05/11/2015    AST 34 (H) 07/03/2024    ALT 21 07/03/2024       Lab Results   Component Value Date    WBC 7.69 07/03/2024    HGB 14.8 07/03/2024    HCT 45.7 07/03/2024    MCV 95.0 07/03/2024     07/03/2024       Last Urine Toxicity  More data exists         Latest Ref Rng & Units 7/3/2024 8/20/2022   LAST URINE TOXICITY RESULTS   Amphetamine, Urine Qual Negative Positive  Positive    Barbiturates Screen, Urine Negative Negative  Negative    Benzodiazepine Screen, Urine Negative Positive  Positive    Buprenorphine, Screen, Urine Negative Positive  Positive    Cocaine Screen, Urine Negative Negative  Negative    Fentanyl, Urine Negative Negative  -   Methadone Screen , Urine Negative Negative  Negative    Methamphetamine, Ur Negative Positive  Positive        Brief Urine Lab Results  (Last result in the past 365 days)        Color   Clarity   Blood   Leuk Est   Nitrite   Protein   CREAT   Urine HCG        07/03/24 1539 Dark Yellow   Cloudy   Small (1+)   Moderate (2+)   Positive   100 mg/dL (2+)           07/03/24 1539               Negative               DATA  Labs reviewed.  Creatinine is 1.54, eGFR is 14.2.  AST is 34.  Hep C is reactive.  Urinalysis shows dark yellow color, cloudy appearance, ketones, nitrite,  leukocyte esterase, protein, bilirubin, 11-20  RBC, too numerous to count WBCs and 2+ bacteria.  Urine drug screen is positive for amphetamine, benzodiazepine, buprenorphine and methamphetamine.  EKG reviewed. QTc interval 461 ms  YAN reviewed. No active controlled rx noted.   Record reviewed. The patient was last here in August 2022 and treated for     Strengths: Minimal    Weaknesses:Substance use and Poor coping skills    Code status:  Full  Discussed code status with patient.    ASSESSMENT & PLAN:    Hospital bed: No      Psychosis  -Appears to be due to recent meth use  -Start olanzapine 5 mg hs      Depressive disorder unspecified  -Start escitalopram 10 mg daily      Methamphetamine use disorder, severe  -Supportive treatment      Opioid use disorder, severe, dependence on maintenance  -Patient reports she is in active treatment on MAT and plans to go back and wants to continue MAT.   -She takes currently is taking one Suboxone 8-2 mg daily as she has to make it last  -Continue Suboxone 8-2 mg one daily      Nicotine use disorder  -Nicotine replacement      UTI (urinary tract infection)  -Start nitrofurantoin       Alcohol use disorder, severe, dependence  -Monitor for withdrawals      Elevated creatinine  -Appears to be trending down  -BMP in am      The patient has been admitted for safety and stabilization.  Patient will be monitored for suicidality daily and maintained on Special Precautions Level 3 (q15 min checks) .  The patient will have individual and group therapy with a master's level therapist. A master treatment plan will be developed and agreed upon by the patient and his/her treatment team.  The patient's estimated length of stay in the hospital is 5-7 days.

## 2024-07-04 NOTE — PLAN OF CARE
Goal Outcome Evaluation:  Plan of Care Reviewed With: patient  Patient Agreement with Plan of Care: agrees     Progress: no change  Outcome Evaluation: Patient refused admission assessment.

## 2024-07-05 LAB
ANION GAP SERPL CALCULATED.3IONS-SCNC: 8.2 MMOL/L (ref 5–15)
BACTERIA SPEC AEROBE CULT: ABNORMAL
BUN SERPL-MCNC: 14 MG/DL (ref 6–20)
BUN/CREAT SERPL: 14 (ref 7–25)
CALCIUM SPEC-SCNC: 9.3 MG/DL (ref 8.6–10.5)
CHLORIDE SERPL-SCNC: 108 MMOL/L (ref 98–107)
CO2 SERPL-SCNC: 26.8 MMOL/L (ref 22–29)
CREAT SERPL-MCNC: 1 MG/DL (ref 0.57–1)
EGFRCR SERPLBLD CKD-EPI 2021: 67.5 ML/MIN/1.73
GLUCOSE SERPL-MCNC: 88 MG/DL (ref 65–99)
POTASSIUM SERPL-SCNC: 3.2 MMOL/L (ref 3.5–5.2)
POTASSIUM SERPL-SCNC: 3.9 MMOL/L (ref 3.5–5.2)
SODIUM SERPL-SCNC: 143 MMOL/L (ref 136–145)

## 2024-07-05 PROCEDURE — 84132 ASSAY OF SERUM POTASSIUM: CPT | Performed by: PSYCHIATRY & NEUROLOGY

## 2024-07-05 PROCEDURE — 80048 BASIC METABOLIC PNL TOTAL CA: CPT | Performed by: PSYCHIATRY & NEUROLOGY

## 2024-07-05 PROCEDURE — 99232 SBSQ HOSP IP/OBS MODERATE 35: CPT | Performed by: PSYCHIATRY & NEUROLOGY

## 2024-07-05 RX ORDER — PROPRANOLOL HYDROCHLORIDE 10 MG/1
10 TABLET ORAL ONCE AS NEEDED
Status: DISCONTINUED | OUTPATIENT
Start: 2024-07-05 | End: 2024-07-05

## 2024-07-05 RX ORDER — POTASSIUM CHLORIDE 20 MEQ/1
40 TABLET, EXTENDED RELEASE ORAL EVERY 4 HOURS
Status: COMPLETED | OUTPATIENT
Start: 2024-07-05 | End: 2024-07-05

## 2024-07-05 RX ADMIN — POTASSIUM CHLORIDE 40 MEQ: 1500 TABLET, EXTENDED RELEASE ORAL at 16:55

## 2024-07-05 RX ADMIN — NICOTINE POLACRILEX 2 MG: 2 GUM, CHEWING BUCCAL at 19:14

## 2024-07-05 RX ADMIN — NITROFURANTOIN MONOHYDRATE/MACROCRYSTALLINE 100 MG: 25; 75 CAPSULE ORAL at 21:35

## 2024-07-05 RX ADMIN — POTASSIUM CHLORIDE 40 MEQ: 1500 TABLET, EXTENDED RELEASE ORAL at 13:26

## 2024-07-05 NOTE — NURSING NOTE
Pt had came to get in line for morning meds and did not want to wait for meds and went back to her room and refused to come back for meds. Rn aware

## 2024-07-05 NOTE — NURSING NOTE
Pt VS as follows: /94, HR 48, o2 95%, Temp 97.3 F. Attempted to call Dr. Murdock to report heart rate before administering pt PRN propanolol. Will try to call again.

## 2024-07-05 NOTE — NURSING NOTE
Spoke to Dr. Murdock via telephone and reported pt VS. Gave orders to give pt gatorade and a snack and re check heart rate. Gave orders to call back after heart rate is re checked to determine if pt can have scheduled night meds. RBTOX2.

## 2024-07-05 NOTE — NURSING NOTE
Anuskha from [facility] called and patient has been approved and accepted to their facility upon discharge. Anushka advised to notify [facility], [phone], to arrange transportation upon patient's discharge. Also, patient's discharge summary/AVS is to be faxed to [facility] upon discharge.

## 2024-07-05 NOTE — PLAN OF CARE
Goal Outcome Evaluation:  Plan of Care Reviewed With: patient  Patient Agreement with Plan of Care: agrees     Progress: no change  Outcome Evaluation: Patient continues to be withdrawn to her room and avoid social interactions. Rates anxiety and depression both a 8. Denies SI/HI or AVH.

## 2024-07-05 NOTE — PLAN OF CARE
Goal Outcome Evaluation:  Plan of Care Reviewed With: patient  Patient Agreement with Plan of Care: agrees  Consent Given to Review Plan with: No one  Progress: improving  Outcome Evaluation: Therapist met with Patient to review treatment progress and aftercare plans; Patient agreeable.        Problem: Adult Behavioral Health Plan of Care  Goal: Plan of Care Review  Outcome: Ongoing, Progressing  Flowsheets  Taken 7/5/2024 1421  Progress: improving  Plan of Care Reviewed With: patient  Patient Agreement with Plan of Care: agrees  Outcome Evaluation:   Therapist met with Patient to review treatment progress and aftercare plans   Patient agreeable.  Taken 7/4/2024 0927  Consent Given to Review Plan with: No one  Goal: Optimized Coping Skills in Response to Life Stressors  Outcome: Ongoing, Progressing  Flowsheets (Taken 7/5/2024 1421)  Optimized Coping Skills in Response to Life Stressors: making progress toward outcome  Intervention: Promote Effective Coping Strategies  Flowsheets (Taken 7/5/2024 1421)  Supportive Measures:   active listening utilized   counseling provided   goal-setting facilitated   verbalization of feelings encouraged  Goal: Develops/Participates in Therapeutic [place] to Support Successful Transition  Outcome: Ongoing, Progressing  Flowsheets (Taken 7/5/2024 1421)  Develops/Participates in Therapeutic [place] to Support Successful Transition: making progress toward outcome  Intervention: Foster Therapeutic [place]  Flowsheets (Taken 7/5/2024 1421)  Trust Relationship/Rapport:   care explained   reassurance provided   choices provided   thoughts/feelings acknowledged   emotional support provided   empathic listening provided   questions answered   questions encouraged     DATA: Therapist met with Patient individually this date. Patient agreeable to discuss current treatment progress and discharge concerns.     [facility]     CLINICAL MANUVERING/INTERVENTIONS:   Assisted Patient in processing session content; acknowledged and normalized Patient’s thoughts, feelings, and concerns by utilizing a person-centered approach in efforts to build appropriate rapport and a positive therapeutic relationship with open and honest communication. Allowed Patient to ventilate regarding current stressors and triggers for negative emotions and thoughts in a safe nonjudgmental environment with unconditional positive regard, active listening skills, and empathy.    ASSESSMENT: Patient was seen for a follow up today. She continues to receive treatment for psychosis. Patient continues to be guarded and somewhat irritable. She did decide today that she would pursue rehab as it does not seem that she has a favorable situation to return to at home. She states that she has been to [facility] in the past and would like to return to their facility in [facility]. She continues to decline family involvement.     PLAN:   Patient will continue stabilization. Patient will continue to receive services offered by Treatment Team.     Referral sent to [facility].

## 2024-07-05 NOTE — PLAN OF CARE
Goal Outcome Evaluation:  Plan of Care Reviewed With: patient  Patient Agreement with Plan of Care: agrees     Progress: no change  Outcome Evaluation: Pt withdrawn to room, refused snack. Pt minimally cooperative with assessment, appears irritable. Rates anxiety and depression 8. Denies SI,HI,AVH. Reports poor appetite and good sleep.
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
## 2024-07-05 NOTE — PROGRESS NOTES
Navigator is helping with the following referral:    [facility] - [phone]  -Sent 7/5  -Intake states they received referral. Treatment team to send updated MD notes on Monday.  7/5

## 2024-07-05 NOTE — NURSING NOTE
Pt requesting nicorette gum instead of patch. Pt also states she cannot take PRN vistaril due to antihistamine allergy. Called and spoke to Dr. Murdock, reported most recent BP, pt requests and pt allergies. Gave orders to DC nicotine patch and order nicorette gum 2mg q2hr PRN. Gave orders to DC vistaril and order propanolol 10mg once PRN for anxiety. RBTOX2.
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
## 2024-07-06 PROCEDURE — 99232 SBSQ HOSP IP/OBS MODERATE 35: CPT | Performed by: PSYCHIATRY & NEUROLOGY

## 2024-07-06 RX ADMIN — ESCITALOPRAM 10 MG: 10 TABLET, FILM COATED ORAL at 08:17

## 2024-07-06 RX ADMIN — NICOTINE POLACRILEX 2 MG: 2 GUM, CHEWING BUCCAL at 20:26

## 2024-07-06 RX ADMIN — NICOTINE POLACRILEX 2 MG: 2 GUM, CHEWING BUCCAL at 16:56

## 2024-07-06 RX ADMIN — NICOTINE POLACRILEX 2 MG: 2 GUM, CHEWING BUCCAL at 08:21

## 2024-07-06 RX ADMIN — NITROFURANTOIN MONOHYDRATE/MACROCRYSTALLINE 100 MG: 25; 75 CAPSULE ORAL at 20:24

## 2024-07-06 RX ADMIN — NITROFURANTOIN MONOHYDRATE/MACROCRYSTALLINE 100 MG: 25; 75 CAPSULE ORAL at 08:17

## 2024-07-06 RX ADMIN — BUPRENORPHINE HYDROCHLORIDE AND NALOXONE HYDROCHLORIDE DIHYDRATE 1 TABLET: 8; 2 TABLET SUBLINGUAL at 11:00

## 2024-07-06 NOTE — PLAN OF CARE
Goal Outcome Evaluation:  Plan of Care Reviewed With: patient  Patient Agreement with Plan of Care: agrees     Progress: no change  Outcome Evaluation: Pt rated anxiety 10/10, depression 8/10, denied SI/HI/AVH, rated good appetite and poor sleep.

## 2024-07-06 NOTE — NURSING NOTE
"Patient came to nurse's station requesting that the doctor be called because she wants to be discharged. Patient made aware that she is accepted to [facility] and was encouraged to remain hospitalized until Monday, when transportation could be arranged. Patient became irritated and angry, stating, \"I signed myself in here so I can sign myself out. I need to go home and get my stuff together to go to that rehab.\" Dr. De was contacted, per patient's request. No new orders noted.   "

## 2024-07-06 NOTE — PLAN OF CARE
Goal Outcome Evaluation:  Plan of Care Reviewed With: patient  Patient Agreement with Plan of Care: agrees     Progress: no change  Outcome Evaluation: Patient continues to be withdrawn and avoid social interaction. Rates anxiety and depression both a 8. Denies SI/HI or AVH.

## 2024-07-06 NOTE — PROGRESS NOTES
"INPATIENT PSYCHIATRIC PROGRESS NOTE    Name:  Ernesto Stone  :  1970  MRN:  [id number]  Visit Number:  [id number]  Length of stay:  3    SUBJECTIVE    CC/Focus of Exam: Psychosis    INTERVAL HISTORY:  The patient reports that she is, \"a lot better.\"  She reports that her heart rate tends to stay low and she has been bradycardic and as a result her maintenance medications for opioid use disorder were held this morning.  She is requesting discharge, however she was experiencing delusions and psychotic symptoms yesterday and though none are overtly noted today, this is her first day of improvement if none should arise later.  I advised that patient would be holdable and would need to stay at least a few more days.  She denied SI/HI/AVH.    Depression rating 2/10  Anxiety rating 2/10  Sleep: Good  Withdrawal sx: None  Cravin/10 for cigarettes    Review of Systems   Respiratory: Negative.     Cardiovascular: Negative.    Gastrointestinal: Negative.    Psychiatric/Behavioral:  Negative for confusion and dysphoric mood. The patient is not nervous/anxious.        OBJECTIVE    Temp:  [97.1 °F (36.2 °C)-98 °F (36.7 °C)] 97.1 °F (36.2 °C)  Heart Rate:  [44-51] 51  Resp:  [18] 18  BP: (142-155)/(70-94) 153/90    MENTAL STATUS EXAM:  Appearance:Casually dressed, good hygeine.   Cooperation:Cooperative  Psychomotor: No psychomotor agitation/retardation, No EPS, No motor tics  Speech-normal rate, amount.  Mood \"a lot better\"   Affect-congruent, appropriate, stable  Thought Content-improving  Thought process-linear, organized.  Suicidality: No SI  Homicidality: No HI  Perception: No AH/VH  Insight-poor   Judgement-poor    Lab Results (last 24 hours)       Procedure Component Value Units Date/Time    Potassium [[id number]]  (Normal) Collected: 24    Specimen: Blood Updated: 24     Potassium 3.9 mmol/L      Comment: Slight hemolysis detected by analyzer. Result may be falsely elevated.         "          Imaging Results (Last 24 Hours)       ** No results found for the last 24 hours. **               ECG/EMG Results (most recent)       None             ALLERGIES: Antihistamines, diphenhydramine-type; Benadryl [diphenhydramine]; Codeine; and Hydrocodone    Medication Review:   Scheduled Medications:  buprenorphine-naloxone, 1 tablet, Sublingual, Daily  escitalopram, 10 mg, Oral, Daily  nitrofurantoin (macrocrystal-monohydrate), 100 mg, Oral, Q12H  OLANZapine, 5 mg, Oral, Nightly         PRN Medications:    acetaminophen    albuterol sulfate HFA    aluminum-magnesium hydroxide-simethicone    benzonatate    benztropine **OR** benztropine    famotidine    ibuprofen    loperamide    magnesium hydroxide    nicotine polacrilex    ondansetron ODT    Potassium Replacement - Follow Nurse / BPA Driven Protocol    sodium chloride    traZODone   All medications reviewed.    ASSESSMENT & PLAN:      Psychosis  -Appears to be due to recent meth use  -Started olanzapine 5 mg hs on admission  -Patient seems to be improving today and overt delusions or paranoia are noted but patient has done this before so we will continue hospitalization to insure stability       Depressive disorder unspecified  -Started escitalopram 10 mg daily on admission       Methamphetamine use disorder, severe  -Supportive treatment       Opioid use disorder, severe, dependence on maintenance  -Patient reports she is in active treatment on MAT and plans to go back and wants to continue MAT.   -She takes currently is taking one Suboxone 8-2 mg daily as she has to make it last  -Continue Suboxone 8-2 mg one daily       Nicotine use disorder  -Nicotine replacement       UTI (urinary tract infection)  -Started nitrofurantoin course       Alcohol use disorder, severe, dependence  -Monitor for withdrawals       Elevated creatinine  -Resolved      Hypokalemia  -Replacement protocol    Special precautions: Special Precautions Level 3 (q15 min checks)  .    Behavioral Health Treatment Plan and Problem List: I have reviewed and approved the Behavioral Health Treatment Plan and Problem list.  The patient has had a chance to review and agrees with the treatment plan.    Copied text in portions of this note has been reviewed and is accurate as of 07/06/24         Clinician:  Rajeev De MD  07/06/24  12:59 EDT

## 2024-07-07 PROCEDURE — 99232 SBSQ HOSP IP/OBS MODERATE 35: CPT | Performed by: PSYCHIATRY & NEUROLOGY

## 2024-07-07 RX ADMIN — NICOTINE POLACRILEX 2 MG: 2 GUM, CHEWING BUCCAL at 20:19

## 2024-07-07 RX ADMIN — NICOTINE POLACRILEX 2 MG: 2 GUM, CHEWING BUCCAL at 12:11

## 2024-07-07 RX ADMIN — BUPRENORPHINE HYDROCHLORIDE AND NALOXONE HYDROCHLORIDE DIHYDRATE 1 TABLET: 8; 2 TABLET SUBLINGUAL at 08:05

## 2024-07-07 RX ADMIN — NITROFURANTOIN MONOHYDRATE/MACROCRYSTALLINE 100 MG: 25; 75 CAPSULE ORAL at 08:05

## 2024-07-07 RX ADMIN — ESCITALOPRAM 10 MG: 10 TABLET, FILM COATED ORAL at 08:05

## 2024-07-07 RX ADMIN — NICOTINE POLACRILEX 2 MG: 2 GUM, CHEWING BUCCAL at 16:02

## 2024-07-07 NOTE — PLAN OF CARE
Problem: Adult Behavioral Health Plan of Care  Goal: Plan of Care Review  Outcome: Ongoing, Progressing  Flowsheets  Taken 7/7/2024 1350  Progress: improving  Outcome Evaluation: PATIENT VERBALIZES ANXIETY AND DEPRESSION, POOR SLEEP AS ONLY PROBLEMS THIS SHIFT. PATIENT IS AOX3, COOPERATIVE WITH NO S/S OF ACUTE DISTRESS NOTED  Taken 7/7/2024 0801  Plan of Care Reviewed With: patient  Patient Agreement with Plan of Care: agrees   Goal Outcome Evaluation:  Plan of Care Reviewed With: patient  Patient Agreement with Plan of Care: agrees     Progress: improving  Outcome Evaluation: PATIENT VERBALIZES ANXIETY AND DEPRESSION, POOR SLEEP AS ONLY PROBLEMS THIS SHIFT. PATIENT IS AOX3, COOPERATIVE WITH NO S/S OF ACUTE DISTRESS NOTED

## 2024-07-07 NOTE — PROGRESS NOTES
"INPATIENT PSYCHIATRIC PROGRESS NOTE    Name:  Ernesto Stone  :  1970  MRN:  [id number]  Visit Number:  [id number]  Length of stay:  4    SUBJECTIVE    CC/Focus of Exam: Psychosis    INTERVAL HISTORY:  Patient reports that she is feeling much better and denies any complaints to me.  She reported to nursing staff that her anxiety was a 9 and her depression was a 3 but she minimizes the symptoms to me and continues to fixate on aftercare planning as she states she needs to get home and get her affairs in order prior to going to rehab which I advised she could discuss with social work tomorrow.  She denies SI/HI/AVH.    Depression rating 1/10  Anxiety rating 1/10  Sleep: Restless  Withdrawal sx: None  Cravin/10 for cigarettes    Review of Systems   Respiratory: Negative.     Cardiovascular: Negative.    Gastrointestinal: Negative.    Psychiatric/Behavioral:  Negative for confusion and dysphoric mood. The patient is not nervous/anxious.        OBJECTIVE    Temp:  [96.9 °F (36.1 °C)-97.5 °F (36.4 °C)] 97.5 °F (36.4 °C)  Heart Rate:  [54-56] 56  Resp:  [16-18] 16  BP: (131-140)/(88-96) 131/88    MENTAL STATUS EXAM:  Appearance:Casually dressed, good hygeine.   Cooperation:Cooperative  Psychomotor: No psychomotor agitation/retardation, No EPS, No motor tics  Speech-normal rate, amount.  Mood \"I feel better, I'm fine\"   Affect-congruent, appropriate, stable  Thought Content-improving  Thought process-linear, organized.  Suicidality: No SI  Homicidality: No HI  Perception: No AH/VH  Insight-poor   Judgement-poor    Lab Results (last 24 hours)       ** No results found for the last 24 hours. **               Imaging Results (Last 24 Hours)       ** No results found for the last 24 hours. **               ECG/EMG Results (most recent)       None             ALLERGIES: Antihistamines, diphenhydramine-type; Benadryl [diphenhydramine]; Codeine; and Hydrocodone    Medication Review:   Scheduled " Medications:  buprenorphine-naloxone, 1 tablet, Sublingual, Daily  escitalopram, 10 mg, Oral, Daily  nitrofurantoin (macrocrystal-monohydrate), 100 mg, Oral, Q12H  OLANZapine, 5 mg, Oral, Nightly         PRN Medications:    acetaminophen    albuterol sulfate HFA    aluminum-magnesium hydroxide-simethicone    benzonatate    benztropine **OR** benztropine    famotidine    ibuprofen    loperamide    magnesium hydroxide    nicotine polacrilex    ondansetron ODT    Potassium Replacement - Follow Nurse / BPA Driven Protocol    sodium chloride    traZODone   All medications reviewed.    ASSESSMENT & PLAN:      Psychosis  -Appears to be due to recent meth use  -Started olanzapine 5 mg hs on admission  -Patient states he back at her baseline and psychosis is likely substance-induced secondary to methamphetamine.       Depressive disorder unspecified  -Started escitalopram 10 mg daily on admission       Methamphetamine use disorder, severe  -Supportive treatment       Opioid use disorder, severe, dependence on maintenance  -Patient reports she is in active treatment on MAT and plans to go back and wants to continue MAT.   -She takes currently is taking one Suboxone 8-2 mg daily as she has to make it last  -Continue Suboxone 8-2 mg one daily       Nicotine use disorder  -Nicotine replacement       UTI (urinary tract infection)  -Started nitrofurantoin course       Alcohol use disorder, severe, dependence  -Monitor for withdrawals       Elevated creatinine  -Resolved      Hypokalemia  -Replacement protocol    Special precautions: Special Precautions Level 3 (q15 min checks) .    Behavioral Health Treatment Plan and Problem List: I have reviewed and approved the Behavioral Health Treatment Plan and Problem list.  The patient has had a chance to review and agrees with the treatment plan.    Copied text in portions of this note has been reviewed and is accurate as of 07/07/24         Clinician:  Rajeev De,  MD  07/07/24  14:46 EDT

## 2024-07-07 NOTE — PLAN OF CARE
Goal Outcome Evaluation:  Plan of Care Reviewed With: patient  Patient Agreement with Plan of Care: agrees     Progress: no change  Outcome Evaluation: Pt calm and cooperative during assessment, rated anxiety 8/10, depression 8/10, denied SI/HI/AVH, rated poor sleep and good appetite.

## 2024-07-08 VITALS
HEART RATE: 65 BPM | BODY MASS INDEX: 22.54 KG/M2 | TEMPERATURE: 98.1 F | SYSTOLIC BLOOD PRESSURE: 167 MMHG | DIASTOLIC BLOOD PRESSURE: 96 MMHG | WEIGHT: 152.2 LBS | HEIGHT: 69 IN | OXYGEN SATURATION: 95 % | RESPIRATION RATE: 18 BRPM

## 2024-07-08 PROCEDURE — 99239 HOSP IP/OBS DSCHRG MGMT >30: CPT | Performed by: PSYCHIATRY & NEUROLOGY

## 2024-07-08 RX ORDER — ESCITALOPRAM OXALATE 10 MG/1
10 TABLET ORAL DAILY
Qty: 30 TABLET | Refills: 0 | Status: SHIPPED | OUTPATIENT
Start: 2024-07-08

## 2024-07-08 RX ORDER — BUPRENORPHINE HYDROCHLORIDE AND NALOXONE HYDROCHLORIDE DIHYDRATE 8; 2 MG/1; MG/1
1 TABLET SUBLINGUAL DAILY
Start: 2024-07-08 | End: 2024-07-10

## 2024-07-08 RX ORDER — NALOXONE HYDROCHLORIDE 4 MG/.1ML
SPRAY NASAL
Qty: 2 EACH | Refills: 0 | Status: SHIPPED | OUTPATIENT
Start: 2024-07-08

## 2024-07-08 RX ORDER — OLANZAPINE 5 MG/1
5 TABLET ORAL NIGHTLY
Qty: 30 TABLET | Refills: 0 | Status: SHIPPED | OUTPATIENT
Start: 2024-07-08

## 2024-07-08 RX ADMIN — NICOTINE POLACRILEX 2 MG: 2 GUM, CHEWING BUCCAL at 08:05

## 2024-07-08 RX ADMIN — BUPRENORPHINE HYDROCHLORIDE AND NALOXONE HYDROCHLORIDE DIHYDRATE 1 TABLET: 8; 2 TABLET SUBLINGUAL at 08:05

## 2024-07-08 RX ADMIN — ESCITALOPRAM 10 MG: 10 TABLET, FILM COATED ORAL at 08:05

## 2024-07-08 NOTE — DISCHARGE SUMMARY
":  1970  MRN:  [id number]  Visit Number:  [id number]      Date of Admission:7/3/2024   Date of Discharge:  2024    Discharge Diagnosis:  Principal Problem:    Psychosis  Active Problems:    Depressive disorder unspecified    Methamphetamine use disorder, severe    Opioid use disorder, severe, dependence    Nicotine use disorder    UTI (urinary tract infection)    Alcohol use disorder, severe, dependence        Admission Diagnosis:  Paranoid behavior [F22]     HPI  Ernesto Stone is a 54 y.o. female who was admitted on 7/3/2024 with complaints of hallucinations and bizarre behaviors.   For details please see H&P dated 24.    Hospital Course  Patient is a 54 y.o. female presented with psychosis with paranoid thinking and bizarre behaviors and her psychosis appeared to be precipitated by her recent meth use, though patient has had episodes of prolonged psychosis even after stopping methamphetamine. She was admitted to adult psych unit and started on olanzapine for psychosis and escitalopram for depression. The patient at first was convinced that her family was murdered and that no one was telling her the truth. The patient reported a history of opioid use disorder and being treated with MAT. She wanted to resume MAT and was started on Suboxone 8-2 mg daily.  Gradually the patient's confusion and psychosis abated and she was no longer worried about her family's safety and had developed insight that that her fear about her family being harmed was not real.       Mental Status Exam upon discharge:   Mood \"better\"   Affect-congruent, appropriate, stable  Thought Content-goal directed, no delusional material present  Thought process-linear, organized.  Suicidality: No SI  Homicidality: No HI  Perception: No AH/VH    Procedures Performed         Consults:   Consults       No orders found from 2024 to 2024.            Pertinent Test Results:   Admission on 2024   Component Date Value Ref " Range Status    Hepatitis B Surface Ag 07/04/2024 Non-Reactive  Non-Reactive Final    Hep A IgM 07/04/2024 Non-Reactive  Non-Reactive Final    Hep B C IgM 07/04/2024 Non-Reactive  Non-Reactive Final    Hepatitis C Ab 07/04/2024 Reactive (A)  Non-Reactive Final    Glucose 07/05/2024 88  65 - 99 mg/dL Final    BUN 07/05/2024 14  6 - 20 mg/dL Final    Creatinine 07/05/2024 1.00  0.57 - 1.00 mg/dL Final    Sodium 07/05/2024 143  136 - 145 mmol/L Final    Potassium 07/05/2024 3.2 (L)  3.5 - 5.2 mmol/L Final    Chloride 07/05/2024 108 (H)  98 - 107 mmol/L Final    CO2 07/05/2024 26.8  22.0 - 29.0 mmol/L Final    Calcium 07/05/2024 9.3  8.6 - 10.5 mg/dL Final    BUN/Creatinine Ratio 07/05/2024 14.0  7.0 - 25.0 Final    Anion Gap 07/05/2024 8.2  5.0 - 15.0 mmol/L Final    eGFR 07/05/2024 67.5  >60.0 mL/min/1.73 Final    Potassium 07/05/2024 3.9  3.5 - 5.2 mmol/L Final    Slight hemolysis detected by analyzer. Result may be falsely elevated.   Admission on 07/03/2024, Discharged on 07/03/2024   Component Date Value Ref Range Status    Magnesium 07/03/2024 1.8  1.6 - 2.6 mg/dL Final    Glucose 07/03/2024 106 (H)  65 - 99 mg/dL Final    BUN 07/03/2024 21 (H)  6 - 20 mg/dL Final    Creatinine 07/03/2024 1.98 (H)  0.57 - 1.00 mg/dL Final    Sodium 07/03/2024 138  136 - 145 mmol/L Final    Potassium 07/03/2024 3.4 (L)  3.5 - 5.2 mmol/L Final    Slight hemolysis detected by analyzer. Result may be falsely elevated.    Chloride 07/03/2024 98  98 - 107 mmol/L Final    CO2 07/03/2024 21.3 (L)  22.0 - 29.0 mmol/L Final    Calcium 07/03/2024 10.4  8.6 - 10.5 mg/dL Final    Total Protein 07/03/2024 7.6  6.0 - 8.5 g/dL Final    Albumin 07/03/2024 4.4  3.5 - 5.2 g/dL Final    ALT (SGPT) 07/03/2024 21  1 - 33 U/L Final    AST (SGOT) 07/03/2024 34 (H)  1 - 32 U/L Final    Alkaline Phosphatase 07/03/2024 85  39 - 117 U/L Final    Total Bilirubin 07/03/2024 0.9  0.0 - 1.2 mg/dL Final    Globulin 07/03/2024 3.2  gm/dL Final    A/G Ratio  07/03/2024 1.4  g/dL Final    BUN/Creatinine Ratio 07/03/2024 10.6  7.0 - 25.0 Final    Anion Gap 07/03/2024 18.7 (H)  5.0 - 15.0 mmol/L Final    eGFR 07/03/2024 29.7 (L)  >60.0 mL/min/1.73 Final    Ethanol 07/03/2024 <10  0 - 10 mg/dL Final    Ethanol % 07/03/2024 <0.010  % Final    THC, Screen, Urine 07/03/2024 Negative  Negative Final    Phencyclidine (PCP), Urine 07/03/2024 Negative  Negative Final    Cocaine Screen, Urine 07/03/2024 Negative  Negative Final    Methamphetamine, Ur 07/03/2024 Positive (A)  Negative Final    Opiate Screen 07/03/2024 Negative  Negative Final    Amphetamine Screen, Urine 07/03/2024 Positive (A)  Negative Final    Benzodiazepine Screen, Urine 07/03/2024 Positive (A)  Negative Final    Tricyclic Antidepressants Screen 07/03/2024 Negative  Negative Final    Methadone Screen, Urine 07/03/2024 Negative  Negative Final    Barbiturates Screen, Urine 07/03/2024 Negative  Negative Final    Oxycodone Screen, Urine 07/03/2024 Negative  Negative Final    Buprenorphine, Screen, Urine 07/03/2024 Positive (A)  Negative Final    HCG, Urine QL 07/03/2024 Negative  Negative Final    Color, UA 07/03/2024 Dark Yellow (A)  Yellow, Straw Final    Appearance, UA 07/03/2024 Cloudy (A)  Clear Final    pH, UA 07/03/2024 5.5  5.0 - 8.0 Final    Specific Gravity, UA 07/03/2024 1.024  1.005 - 1.030 Final    Glucose, UA 07/03/2024 Negative  Negative Final    Ketones, UA 07/03/2024 40 mg/dL (2+) (A)  Negative Final    Bilirubin, UA 07/03/2024 Small (1+) (A)  Negative Final    Blood, UA 07/03/2024 Small (1+) (A)  Negative Final    Protein, UA 07/03/2024 100 mg/dL (2+) (A)  Negative Final    Leuk Esterase, UA 07/03/2024 Moderate (2+) (A)  Negative Final    Nitrite, UA 07/03/2024 Positive (A)  Negative Final    Urobilinogen, UA 07/03/2024 1.0 E.U./dL  0.2 - 1.0 E.U./dL Final    WBC 07/03/2024 7.69  3.40 - 10.80 10*3/mm3 Final    RBC 07/03/2024 4.81  3.77 - 5.28 10*6/mm3 Final    Hemoglobin 07/03/2024 14.8  12.0 -  15.9 g/dL Final    Hematocrit 07/03/2024 45.7  34.0 - 46.6 % Final    MCV 07/03/2024 95.0  79.0 - 97.0 fL Final    MCH 07/03/2024 30.8  26.6 - 33.0 pg Final    MCHC 07/03/2024 32.4  31.5 - 35.7 g/dL Final    RDW 07/03/2024 13.0  12.3 - 15.4 % Final    RDW-SD 07/03/2024 45.1  37.0 - 54.0 fl Final    MPV 07/03/2024 10.9  6.0 - 12.0 fL Final    Platelets 07/03/2024 243  140 - 450 10*3/mm3 Final    Neutrophil % 07/03/2024 70.7  42.7 - 76.0 % Final    Lymphocyte % 07/03/2024 19.8  19.6 - 45.3 % Final    Monocyte % 07/03/2024 7.8  5.0 - 12.0 % Final    Eosinophil % 07/03/2024 0.5  0.3 - 6.2 % Final    Basophil % 07/03/2024 0.9  0.0 - 1.5 % Final    Immature Grans % 07/03/2024 0.3  0.0 - 0.5 % Final    Neutrophils, Absolute 07/03/2024 5.44  1.70 - 7.00 10*3/mm3 Final    Lymphocytes, Absolute 07/03/2024 1.52  0.70 - 3.10 10*3/mm3 Final    Monocytes, Absolute 07/03/2024 0.60  0.10 - 0.90 10*3/mm3 Final    Eosinophils, Absolute 07/03/2024 0.04  0.00 - 0.40 10*3/mm3 Final    Basophils, Absolute 07/03/2024 0.07  0.00 - 0.20 10*3/mm3 Final    Immature Grans, Absolute 07/03/2024 0.02  0.00 - 0.05 10*3/mm3 Final    nRBC 07/03/2024 0.0  0.0 - 0.2 /100 WBC Final    QT Interval 07/03/2024 470  ms Final    QTC Interval 07/03/2024 461  ms Final    Fentanyl, Urine 07/03/2024 Negative  Negative Final    RBC, UA 07/03/2024 11-20 (A)  None Seen, 0-2 /HPF Final    WBC, UA 07/03/2024 Too Numerous to Count (A)  None Seen, 0-2 /HPF Final    Bacteria, UA 07/03/2024 2+ (A)  None Seen /HPF Final    Squamous Epithelial Cells, UA 07/03/2024 0-2  None Seen, 0-2 /HPF Final    Hyaline Casts, UA 07/03/2024 13-20  None Seen /LPF Final    Methodology 07/03/2024 Automated Microscopy   Final    Urine Culture 07/03/2024 50,000 CFU/mL Escherichia coli (A)   Final    Glucose 07/03/2024 89  65 - 99 mg/dL Final    BUN 07/03/2024 19  6 - 20 mg/dL Final    Creatinine 07/03/2024 1.54 (H)  0.57 - 1.00 mg/dL Final    Sodium 07/03/2024 137  136 - 145 mmol/L Final     Potassium 07/03/2024 3.7  3.5 - 5.2 mmol/L Final    Slight hemolysis detected by analyzer. Result may be falsely elevated.    Chloride 07/03/2024 104  98 - 107 mmol/L Final    CO2 07/03/2024 18.9 (L)  22.0 - 29.0 mmol/L Final    Calcium 07/03/2024 9.1  8.6 - 10.5 mg/dL Final    BUN/Creatinine Ratio 07/03/2024 12.3  7.0 - 25.0 Final    Anion Gap 07/03/2024 14.1  5.0 - 15.0 mmol/L Final    eGFR 07/03/2024 40.2 (L)  >60.0 mL/min/1.73 Final        Condition on Discharge:  improved    Vital Signs  Temp:  [97.1 °F (36.2 °C)-98.1 °F (36.7 °C)] 98.1 °F (36.7 °C)  Heart Rate:  [57-65] 65  Resp:  [16-18] 18  BP: (140-167)/(86-96) 167/96      Discharge Disposition:  Rehab Facility or Unit (DC - External)    Discharge Medications:     Discharge Medications        New Medications        Instructions Start Date   buprenorphine-naloxone 8-2 MG per SL tablet  Commonly known as: SUBOXONE   1 tablet, Sublingual, Daily      escitalopram 10 MG tablet  Commonly known as: LEXAPRO   10 mg, Oral, Daily      naloxone 4 MG/0.1ML nasal spray  Commonly known as: NARCAN   Call 911. Don't prime. [place] in 1 nostril for overdose. Repeat in 2-3 minutes in other nostril if no or minimal breathing/responsiveness.      OLANZapine 5 MG tablet  Commonly known as: zyPREXA   5 mg, Oral, Nightly             Continue These Medications        Instructions Start Date   albuterol sulfate  (90 Base) MCG/ACT inhaler  Commonly known as: PROVENTIL HFA;VENTOLIN HFA;PROAIR HFA   2 puffs, Inhalation, Every 4 Hours PRN               Discharge Diet: Regular     Activity at Discharge: As tolerated     Follow-up Appointments  [facility]      Time: I spent  > 30  minutes on this discharge activity which included: face-to-face encounter with the patient, reviewing the data in the system, coordination of the care with the nursing staff as well as consultants, documentation, and entering orders.        Clinician:   Ted Granger MD  07/08/24  08:01 EDT

## 2024-07-08 NOTE — DISCHARGE PLACEMENT REQUEST
"[facility] ETA 1100.  Address [address], [place].        Avery Stone (54 y.o. Female)       Date of Birth   1970    Social Security Number       Address   [address] [place] [address]    Home Phone       MRN   [id number]       [employer]   [employer]    Marital Status   Single                            Admission Date   7/3/24    Admission Type   Emergency    Admitting Provider   Ted Hensley MD    Attending Provider   Ted Hensley MD    Department, Room/Bed   [facility] ADULT PSYCH[facility], 1020/1S       Discharge Date       Discharge Disposition   Rehab Facility or Unit (DC - External)    Discharge Destination                                 Attending Provider: Ted Hensley MD    Allergies: Antihistamines, Diphenhydramine-type, Benadryl [Diphenhydramine], Codeine, Hydrocodone    Isolation: None   Infection: None   Code Status: CPR    Ht: 175.3 cm (69\")   Wt: 69 kg (152 lb 3.2 oz)    Admission Cmt: None   Principal Problem: Psychosis [F29]                   Active Insurance as of 7/3/2024       Primary Coverage       Payor Plan Insurance Group Employer/Plan Group    MEDICAID PENDING MEDICAID PENDING        Payor Plan Address Payor Plan Phone Number Payor Plan Fax Number Effective Dates       7/1/2024 - 8/31/2024      Subscriber Name Subscriber Birth Date Member ID       AVERY STONE 1970 PENDING                     Emergency Contacts        (Rel.) Home Phone Work Phone Mobile Phone    IbrahimJeffrey (Friend) -- -- [phone]              Discharge Order (From admission, onward)       Start     Ordered    07/08/24 0800  Discharge patient  Once        Expected Discharge Date: 07/08/24   Discharge Disposition: Rehab Facility or Unit (DC - External)   Physician of Record for Attribution - Please select from Treatment Team: TED HENSLEY [[id number]]   Review needed by CMO to determine Physician of Record: No      Question Answer Comment   Physician of " Record for Attribution - Please select from Treatment Team RASTA HENSLEY    Review needed by CMO to determine Physician of Record No        07/08/24 0801

## 2024-07-08 NOTE — DISCHARGE INSTR - APPOINTMENTS
[facility]  [address], [place] [address]  [phone]  Thursday July 11th at 10:00am.             [address] [address] ·  [phone]

## 2024-07-08 NOTE — PROGRESS NOTES
"Discharge Planning Assessment  [facility]     Patient Name: Ernesto Stone  MRN: [id number]  Today's Date: 7/8/2024    Admit Date: 7/3/2024    Patient is being discharged today. She denies SI, HI, and AVH. She appears future oriented and is making plans to \"straighter my life out\". Patient states that she needs to return home and tend to certain responsibilities such as finding a home for her dog and getting clothes before going to rehab. She was encouraged to go straight to rehab but she declines. She also declines family involvement. Safety planning was completed with the Patient. She was educated about the crisis hotline, when to call 911 or present to the nearest ER. She signed consent for [facility] and is asking for assistance with a ride home today.        MATT Mcdermott    "

## 2024-07-08 NOTE — PLAN OF CARE
Goal Outcome Evaluation:  Plan of Care Reviewed With: patient  Patient Agreement with Plan of Care: agrees     Progress: improving  Outcome Evaluation: Patient reports she is still struggling with anxiety and depression. Having some cravings for benzos 7/10. C/o: back pain 5/10. Reports appetite is improving. Sleep still poor.

## 2024-08-23 ENCOUNTER — APPOINTMENT (OUTPATIENT)
Dept: CT IMAGING | Facility: HOSPITAL | Age: 54
End: 2024-08-23
Payer: MEDICAID

## 2024-08-23 ENCOUNTER — HOSPITAL ENCOUNTER (EMERGENCY)
Facility: HOSPITAL | Age: 54
Discharge: HOME | End: 2024-08-23
Attending: STUDENT IN AN ORGANIZED HEALTH CARE EDUCATION/TRAINING PROGRAM
Payer: MEDICAID

## 2024-08-23 ENCOUNTER — APPOINTMENT (OUTPATIENT)
Dept: GENERAL RADIOLOGY | Facility: HOSPITAL | Age: 54
End: 2024-08-23
Payer: MEDICAID

## 2024-08-23 VITALS
HEIGHT: 69 IN | SYSTOLIC BLOOD PRESSURE: 151 MMHG | DIASTOLIC BLOOD PRESSURE: 84 MMHG | TEMPERATURE: 97 F | OXYGEN SATURATION: 97 % | BODY MASS INDEX: 22.22 KG/M2 | WEIGHT: 150 LBS | RESPIRATION RATE: 18 BRPM | HEART RATE: 78 BPM

## 2024-08-23 DIAGNOSIS — F10.10 ALCOHOL ABUSE: ICD-10-CM

## 2024-08-23 DIAGNOSIS — A08.4 VIRAL COLITIS: Primary | ICD-10-CM

## 2024-08-23 LAB
ALBUMIN SERPL-MCNC: 4.6 G/DL (ref 3.5–5.2)
ALBUMIN/GLOB SERPL: 1.2 G/DL
ALP SERPL-CCNC: 124 U/L (ref 39–117)
ALT SERPL W P-5'-P-CCNC: 56 U/L (ref 1–33)
AMPHET+METHAMPHET UR QL: NEGATIVE
AMPHETAMINES UR QL: NEGATIVE
ANION GAP SERPL CALCULATED.3IONS-SCNC: 36.2 MMOL/L (ref 5–15)
APTT PPP: 25.3 SECONDS (ref 26.5–34.5)
AST SERPL-CCNC: 97 U/L (ref 1–32)
BACTERIA UR QL AUTO: NORMAL /HPF
BARBITURATES UR QL SCN: NEGATIVE
BASOPHILS # BLD AUTO: 0.07 10*3/MM3 (ref 0–0.2)
BASOPHILS NFR BLD AUTO: 0.7 % (ref 0–1.5)
BENZODIAZ UR QL SCN: NEGATIVE
BILIRUB SERPL-MCNC: 0.7 MG/DL (ref 0–1.2)
BILIRUB UR QL STRIP: NEGATIVE
BUN SERPL-MCNC: 20 MG/DL (ref 6–20)
BUN/CREAT SERPL: 18.2 (ref 7–25)
BUPRENORPHINE SERPL-MCNC: POSITIVE NG/ML
CALCIUM SPEC-SCNC: 9.4 MG/DL (ref 8.6–10.5)
CANNABINOIDS SERPL QL: NEGATIVE
CHLORIDE SERPL-SCNC: 93 MMOL/L (ref 98–107)
CLARITY UR: CLEAR
CO2 SERPL-SCNC: 11.8 MMOL/L (ref 22–29)
COCAINE UR QL: NEGATIVE
COLOR UR: YELLOW
CREAT SERPL-MCNC: 1.1 MG/DL (ref 0.57–1)
CRP SERPL-MCNC: <0.3 MG/DL (ref 0–0.5)
DEPRECATED RDW RBC AUTO: 60.1 FL (ref 37–54)
EGFRCR SERPLBLD CKD-EPI 2021: 60.2 ML/MIN/1.73
EOSINOPHIL # BLD AUTO: 0 10*3/MM3 (ref 0–0.4)
EOSINOPHIL NFR BLD AUTO: 0 % (ref 0.3–6.2)
ERYTHROCYTE [DISTWIDTH] IN BLOOD BY AUTOMATED COUNT: 16.4 % (ref 12.3–15.4)
ETHANOL BLD-MCNC: <10 MG/DL (ref 0–10)
ETHANOL UR QL: <0.01 %
FENTANYL UR-MCNC: NEGATIVE NG/ML
FLUAV RNA RESP QL NAA+PROBE: NOT DETECTED
FLUBV RNA ISLT QL NAA+PROBE: NOT DETECTED
GLOBULIN UR ELPH-MCNC: 3.8 GM/DL
GLUCOSE SERPL-MCNC: 95 MG/DL (ref 65–99)
GLUCOSE UR STRIP-MCNC: NEGATIVE MG/DL
HCT VFR BLD AUTO: 49.1 % (ref 34–46.6)
HGB BLD-MCNC: 15.7 G/DL (ref 12–15.9)
HGB UR QL STRIP.AUTO: NEGATIVE
HYALINE CASTS UR QL AUTO: NORMAL /LPF
IMM GRANULOCYTES # BLD AUTO: 0.02 10*3/MM3 (ref 0–0.05)
IMM GRANULOCYTES NFR BLD AUTO: 0.2 % (ref 0–0.5)
INR PPP: 0.95 (ref 0.9–1.1)
KETONES UR QL STRIP: ABNORMAL
LEUKOCYTE ESTERASE UR QL STRIP.AUTO: NEGATIVE
LIPASE SERPL-CCNC: 23 U/L (ref 13–60)
LYMPHOCYTES # BLD AUTO: 0.62 10*3/MM3 (ref 0.7–3.1)
LYMPHOCYTES NFR BLD AUTO: 5.9 % (ref 19.6–45.3)
MAGNESIUM SERPL-MCNC: 2 MG/DL (ref 1.6–2.6)
MCH RBC QN AUTO: 31.7 PG (ref 26.6–33)
MCHC RBC AUTO-ENTMCNC: 32 G/DL (ref 31.5–35.7)
MCV RBC AUTO: 99.2 FL (ref 79–97)
METHADONE UR QL SCN: NEGATIVE
MONOCYTES # BLD AUTO: 0.35 10*3/MM3 (ref 0.1–0.9)
MONOCYTES NFR BLD AUTO: 3.3 % (ref 5–12)
NEUTROPHILS NFR BLD AUTO: 89.9 % (ref 42.7–76)
NEUTROPHILS NFR BLD AUTO: 9.46 10*3/MM3 (ref 1.7–7)
NITRITE UR QL STRIP: NEGATIVE
NRBC BLD AUTO-RTO: 0 /100 WBC (ref 0–0.2)
OPIATES UR QL: NEGATIVE
OXYCODONE UR QL SCN: NEGATIVE
PCP UR QL SCN: NEGATIVE
PH UR STRIP.AUTO: 5.5 [PH] (ref 5–8)
PLATELET # BLD AUTO: 300 10*3/MM3 (ref 140–450)
PMV BLD AUTO: 10 FL (ref 6–12)
POTASSIUM SERPL-SCNC: 4.9 MMOL/L (ref 3.5–5.2)
PROT SERPL-MCNC: 8.4 G/DL (ref 6–8.5)
PROT UR QL STRIP: ABNORMAL
PROTHROMBIN TIME: 12.8 SECONDS (ref 12.1–14.7)
RBC # BLD AUTO: 4.95 10*6/MM3 (ref 3.77–5.28)
RBC # UR STRIP: NORMAL /HPF
REF LAB TEST METHOD: NORMAL
SARS-COV-2 RNA RESP QL NAA+PROBE: NOT DETECTED
SODIUM SERPL-SCNC: 141 MMOL/L (ref 136–145)
SP GR UR STRIP: 1.02 (ref 1–1.03)
SQUAMOUS #/AREA URNS HPF: NORMAL /HPF
TRICYCLICS UR QL SCN: NEGATIVE
UROBILINOGEN UR QL STRIP: ABNORMAL
WBC # UR STRIP: NORMAL /HPF
WBC NRBC COR # BLD AUTO: 10.52 10*3/MM3 (ref 3.4–10.8)

## 2024-08-23 PROCEDURE — 81001 URINALYSIS AUTO W/SCOPE: CPT | Performed by: PHYSICIAN ASSISTANT

## 2024-08-23 PROCEDURE — 73080 X-RAY EXAM OF ELBOW: CPT | Performed by: RADIOLOGY

## 2024-08-23 PROCEDURE — 25810000003 SODIUM CHLORIDE 0.9 % SOLUTION

## 2024-08-23 PROCEDURE — 85025 COMPLETE CBC W/AUTO DIFF WBC: CPT | Performed by: PHYSICIAN ASSISTANT

## 2024-08-23 PROCEDURE — 83690 ASSAY OF LIPASE: CPT | Performed by: PHYSICIAN ASSISTANT

## 2024-08-23 PROCEDURE — 83735 ASSAY OF MAGNESIUM: CPT | Performed by: PHYSICIAN ASSISTANT

## 2024-08-23 PROCEDURE — 86140 C-REACTIVE PROTEIN: CPT | Performed by: PHYSICIAN ASSISTANT

## 2024-08-23 PROCEDURE — 87636 SARSCOV2 & INF A&B AMP PRB: CPT | Performed by: PHYSICIAN ASSISTANT

## 2024-08-23 PROCEDURE — 82077 ASSAY SPEC XCP UR&BREATH IA: CPT | Performed by: PHYSICIAN ASSISTANT

## 2024-08-23 PROCEDURE — 80053 COMPREHEN METABOLIC PANEL: CPT | Performed by: PHYSICIAN ASSISTANT

## 2024-08-23 PROCEDURE — 72125 CT NECK SPINE W/O DYE: CPT

## 2024-08-23 PROCEDURE — 36415 COLL VENOUS BLD VENIPUNCTURE: CPT

## 2024-08-23 PROCEDURE — 80307 DRUG TEST PRSMV CHEM ANLYZR: CPT | Performed by: PHYSICIAN ASSISTANT

## 2024-08-23 PROCEDURE — 70450 CT HEAD/BRAIN W/O DYE: CPT

## 2024-08-23 PROCEDURE — 72125 CT NECK SPINE W/O DYE: CPT | Performed by: RADIOLOGY

## 2024-08-23 PROCEDURE — 25810000003 SODIUM CHLORIDE 0.9 % SOLUTION: Performed by: PHYSICIAN ASSISTANT

## 2024-08-23 PROCEDURE — 99285 EMERGENCY DEPT VISIT HI MDM: CPT

## 2024-08-23 PROCEDURE — 70450 CT HEAD/BRAIN W/O DYE: CPT | Performed by: RADIOLOGY

## 2024-08-23 PROCEDURE — 25010000002 LORAZEPAM PER 2 MG: Performed by: STUDENT IN AN ORGANIZED HEALTH CARE EDUCATION/TRAINING PROGRAM

## 2024-08-23 PROCEDURE — 85610 PROTHROMBIN TIME: CPT | Performed by: PHYSICIAN ASSISTANT

## 2024-08-23 PROCEDURE — 85730 THROMBOPLASTIN TIME PARTIAL: CPT | Performed by: PHYSICIAN ASSISTANT

## 2024-08-23 PROCEDURE — 74177 CT ABD & PELVIS W/CONTRAST: CPT | Performed by: RADIOLOGY

## 2024-08-23 PROCEDURE — 73080 X-RAY EXAM OF ELBOW: CPT

## 2024-08-23 PROCEDURE — 74177 CT ABD & PELVIS W/CONTRAST: CPT

## 2024-08-23 PROCEDURE — 96374 THER/PROPH/DIAG INJ IV PUSH: CPT

## 2024-08-23 PROCEDURE — 25510000001 IOPAMIDOL 61 % SOLUTION: Performed by: STUDENT IN AN ORGANIZED HEALTH CARE EDUCATION/TRAINING PROGRAM

## 2024-08-23 RX ORDER — SODIUM CHLORIDE 0.9 % (FLUSH) 0.9 %
10 SYRINGE (ML) INJECTION AS NEEDED
Status: DISCONTINUED | OUTPATIENT
Start: 2024-08-23 | End: 2024-08-23 | Stop reason: HOSPADM

## 2024-08-23 RX ORDER — LORAZEPAM 2 MG/ML
0.5 INJECTION INTRAMUSCULAR ONCE
Status: COMPLETED | OUTPATIENT
Start: 2024-08-23 | End: 2024-08-23

## 2024-08-23 RX ORDER — IOPAMIDOL 612 MG/ML
100 INJECTION, SOLUTION INTRAVASCULAR
Status: COMPLETED | OUTPATIENT
Start: 2024-08-23 | End: 2024-08-23

## 2024-08-23 RX ADMIN — SODIUM CHLORIDE 1000 ML: 9 INJECTION, SOLUTION INTRAVENOUS at 15:57

## 2024-08-23 RX ADMIN — SODIUM CHLORIDE 1000 ML: 9 INJECTION, SOLUTION INTRAVENOUS at 13:15

## 2024-08-23 RX ADMIN — LORAZEPAM 0.5 MG: 2 INJECTION INTRAMUSCULAR; INTRAVENOUS at 13:17

## 2024-08-23 RX ADMIN — IOPAMIDOL 85 ML: 612 INJECTION, SOLUTION INTRAVENOUS at 13:46
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
## 2024-08-23 NOTE — NURSING NOTE
1745 Spoke to Dr. Murdock via phone. Discussed pt's assessment, labs and vitals. Advised that she does not think the pt will go into active withdrawal since she has only been drinking again for three weeks. Advised CIWA was most likely elevated r/t how the pt presents medically. She advises the pt be treated medically or DC home. [id number]   Spoke to Dr. Gomez to provide with information from Dr. Murdock. Dr. Gomez provided with Dr. Murdock's contact number to discuss case together.     1805 Dr. Gomez in intake at this time. Advised that he spoke with Dr. Murdock and that they have decided the pt can be discharged home. Pt advised by MD that if she begins to feel worse that she can return to the ED.     1825 Careful discussion with pt regarding discharge. Advised pt that if she begins to feel worse to report to the nearest ED, verbalized understanding. Pt plans to go to waiting room to find family or friend to pick her up. Pt provided with rehab resources as well as shelter resources.

## 2024-08-23 NOTE — DISCHARGE INSTRUCTIONS
I recommend frequent fluids, light activity, lots of rest.  Zofran as needed for nausea vomiting.  If the symptoms acutely worsen or you begin running fevers please return to the ED.

## 2024-08-23 NOTE — ED NOTES
Roseline ALSTON unable to successfully start IV. Priti Dupont RN at bedside attempting to stick at this time.

## 2024-08-23 NOTE — ED NOTES
Unable to successfully establish IV access. Roseline ALSTON states that she will attempt to start IV.

## 2024-10-19 ENCOUNTER — APPOINTMENT (OUTPATIENT)
Dept: GENERAL RADIOLOGY | Facility: HOSPITAL | Age: 54
End: 2024-10-19
Payer: MEDICAID

## 2024-10-19 ENCOUNTER — HOSPITAL ENCOUNTER (EMERGENCY)
Facility: HOSPITAL | Age: 54
Discharge: HOME | End: 2024-10-19
Attending: STUDENT IN AN ORGANIZED HEALTH CARE EDUCATION/TRAINING PROGRAM
Payer: MEDICAID

## 2024-10-19 VITALS
DIASTOLIC BLOOD PRESSURE: 80 MMHG | HEART RATE: 71 BPM | BODY MASS INDEX: 22.96 KG/M2 | RESPIRATION RATE: 20 BRPM | WEIGHT: 155 LBS | OXYGEN SATURATION: 96 % | SYSTOLIC BLOOD PRESSURE: 126 MMHG | TEMPERATURE: 97.5 F | HEIGHT: 69 IN

## 2024-10-19 DIAGNOSIS — S89.91XA INJURY OF RIGHT KNEE, INITIAL ENCOUNTER: Primary | ICD-10-CM

## 2024-10-19 PROCEDURE — 99283 EMERGENCY DEPT VISIT LOW MDM: CPT

## 2024-10-19 PROCEDURE — 73562 X-RAY EXAM OF KNEE 3: CPT | Performed by: RADIOLOGY

## 2024-10-19 PROCEDURE — 73562 X-RAY EXAM OF KNEE 3: CPT

## 2024-10-19 PROCEDURE — 73590 X-RAY EXAM OF LOWER LEG: CPT | Performed by: RADIOLOGY

## 2024-10-19 PROCEDURE — 73590 X-RAY EXAM OF LOWER LEG: CPT

## 2024-10-19 RX ORDER — IBUPROFEN 600 MG/1
600 TABLET, FILM COATED ORAL ONCE
Status: COMPLETED | OUTPATIENT
Start: 2024-10-19 | End: 2024-10-19

## 2024-10-19 RX ADMIN — IBUPROFEN 600 MG: 600 TABLET, FILM COATED ORAL at 13:43

## 2024-10-19 NOTE — Clinical Note
[facility] EMERGENCY DEPARTMENT  [address] [facility] [address] [address]  Phone: [phone]    Ernesto Stone was seen and treated in our emergency department on 10/19/2024.  She may return to work on 10/27/2024.         Thank you for choosing [facility].    Bhavin Arzate PA

## 2024-10-19 NOTE — ED PROVIDER NOTES
Subjective   History of Present Illness  54-year-old female presents secondary to right knee injury.  Patient states that she was about 6 feet up on a ladder.  She states that she was walking stepping down a ladder and her foot slipped in between the rungs.  She was hanging upside down with her knee twisted.  Family helped assist her to the ground.  She denies any head injury, neck injury, back injury, no other injury or complaint.  She has a past medical history of anxiety/depression, asthma, thyroid disease and arthritis.  She presents by private vehicle.      Review of Systems   Constitutional: Negative.  Negative for fever.   HENT: Negative.     Respiratory: Negative.     Cardiovascular: Negative.  Negative for chest pain.   Gastrointestinal: Negative.  Negative for abdominal pain.   Endocrine: Negative.    Genitourinary: Negative.  Negative for dysuria.   Skin: Negative.    Neurological: Negative.    Psychiatric/Behavioral: Negative.     All other systems reviewed and are negative.      Past Medical History:   Diagnosis Date    Alcoholic     Anxiety and depression     Asthma     Bradycardia     Dyspnea     Hx of sepsis 10/2016    Hypothyroid     Osteoarthritis     PVC (premature ventricular contraction)     RA (rheumatoid arthritis)     Ulcer of abdomen wall        Allergies   Allergen Reactions    Antihistamines, Diphenhydramine-Type Hives and Mental Status Change    Benadryl [Diphenhydramine] Hives and Mental Status Change    Codeine Nausea And Vomiting    Hydrocodone Nausea Only       Past Surgical History:   Procedure Laterality Date    CARDIAC CATHETERIZATION N/A 2017    Procedure: Left Heart Cath;  Surgeon: Don Clifford MD;  Location: [facility] INVASIVE LOCATION;  Service:      SECTION      x 2    CHOLECYSTECTOMY      ENDOMETRIAL ABLATION      FRACTURE SURGERY Left     knee     KNEE CARTILAGE SURGERY Left     LAPAROSCOPIC CHOLECYSTECTOMY      LAPAROSCOPIC TUBAL LIGATION      LUMBAR  "LAMINECTOMY DISCECTOMY DECOMPRESSION Left 8/2/2021    Procedure: LUMBAR LAMINECTOMY L4-5;  Surgeon: John Bullock MD;  Location: [facility];  Service: Neurosurgery;  Laterality: Left;    WRIST SURGERY Left     hardware present       Family History   Problem Relation Age of Onset    Cancer Mother     Arthritis Mother     Bradycardia Mother     Other Mother         Tachycardia     Arrhythmia Mother     Cirrhosis Father        Social History     Socioeconomic History    Marital status: Single    Number of children: 4    Highest education level: Associate degree: academic program   Tobacco Use    Smoking status: Every Day     Current packs/day: 0.50     Average packs/day: 0.5 packs/day for 30.0 years (15.0 ttl pk-yrs)     Types: Cigarettes    Smokeless tobacco: Never    Tobacco comments:     Trying to quit, smoking 6 cigarettes per day.    Vaping Use    Vaping status: Every Day    Substances: Nicotine, THC    Devices: Disposable   Substance and Sexual Activity    Alcohol use: Yes     Comment: fifth per day    Drug use: Yes     Types: Methamphetamines, Oxycodone, Marijuana, Amphetamines, Benzodiazepines     Comment: Suboxone 2 strips \"I never use this but I did this morning.\"    Sexual activity: Defer           Objective   Physical Exam  Vitals and nursing note reviewed.   Constitutional:       General: She is not in acute distress.     Appearance: She is well-developed. She is not diaphoretic.   HENT:      Head: Normocephalic and atraumatic.      Right Ear: External ear normal.      Left Ear: External ear normal.      Nose: Nose normal.   Eyes:      Conjunctiva/sclera: Conjunctivae normal.      Pupils: Pupils are equal, round, and reactive to light.   Neck:      Vascular: No JVD.      Trachea: No tracheal deviation.   Cardiovascular:      Rate and Rhythm: Normal rate and regular rhythm.      Heart sounds: Normal heart sounds. No murmur heard.  Pulmonary:      Effort: Pulmonary effort is normal. No respiratory " distress.      Breath sounds: Normal breath sounds. No wheezing.   Abdominal:      General: Bowel sounds are normal.      Palpations: Abdomen is soft.      Tenderness: There is no abdominal tenderness.   Musculoskeletal:         General: No deformity. Normal range of motion.      Cervical back: Normal range of motion and neck supple.      Comments: Right medial knee tenderness.  No swelling at this time.  Neurovascular intact distally.   Skin:     General: Skin is warm and dry.      Coloration: Skin is not pale.      Findings: No erythema or rash.   Neurological:      Mental Status: She is alert and oriented to person, place, and time.      Cranial Nerves: No cranial nerve deficit.   Psychiatric:         Behavior: Behavior normal.         Thought Content: Thought content normal.         Procedures           ED Course                                             Medical Decision Making  54-year-old female presents secondary to right knee injury.  Patient states that she was about 6 feet up on a ladder.  She states that she was walking stepping down a ladder and her foot slipped in between the rungs.  She was hanging upside down with her knee twisted.  Family helped assist her to the ground.  She denies any head injury, neck injury, back injury, no other injury or complaint.  She has a past medical history of anxiety/depression, asthma, thyroid disease and arthritis.  She presents by private vehicle.    Problems Addressed:  Injury of right knee, initial encounter: complicated acute illness or injury    Amount and/or Complexity of Data Reviewed  Radiology: ordered. Decision-making details documented in ED Course.    Risk  Prescription drug management.  Risk Details: Patient was counseled about her x-ray read along with her physical exam.  This is concerning for a ligament tear.  She will be placed in knee immobilizer and crutches.  She has to follow-up with orthopedics.        Final diagnoses:   Injury of right knee,  initial encounter       ED Disposition  ED Disposition       ED Disposition   Discharge    Condition   Stable    Comment   --               Miles Lennon MD  [address] [place] [address] [phone]    Schedule an appointment as soon as possible for a visit            Medication List        New Prescriptions      diclofenac 50 MG EC tablet  Commonly known as: VOLTAREN  Take 1 tablet by mouth 3 (Three) Times a Day As Needed (pain).               Where to Get Your Medications        You can get these medications from any pharmacy    Bring a paper prescription for each of these medications  diclofenac 50 MG EC tablet            Bhavin Arzate PA  10/19/24 5841

## 2024-10-19 NOTE — Clinical Note
[facility] EMERGENCY DEPARTMENT  [address] [facility] [address]  Phone: [phone]    Ernesto Stone was seen and treated in our emergency department on 10/19/2024.  She may return to work on 10/27/2024.         Thank you for choosing [facility].    Bhavin Arzate PA

## 2025-04-09 ENCOUNTER — APPOINTMENT (OUTPATIENT)
Dept: CT IMAGING | Facility: HOSPITAL | Age: 55
End: 2025-04-09
Payer: MEDICAID

## 2025-04-09 ENCOUNTER — HOSPITAL ENCOUNTER (EMERGENCY)
Facility: HOSPITAL | Age: 55
Discharge: HOME | End: 2025-04-09
Attending: STUDENT IN AN ORGANIZED HEALTH CARE EDUCATION/TRAINING PROGRAM | Admitting: STUDENT IN AN ORGANIZED HEALTH CARE EDUCATION/TRAINING PROGRAM
Payer: MEDICAID

## 2025-04-09 VITALS
TEMPERATURE: 100.3 F | DIASTOLIC BLOOD PRESSURE: 96 MMHG | HEIGHT: 69 IN | RESPIRATION RATE: 24 BRPM | WEIGHT: 154.98 LBS | BODY MASS INDEX: 22.96 KG/M2 | SYSTOLIC BLOOD PRESSURE: 109 MMHG | HEART RATE: 67 BPM | OXYGEN SATURATION: 92 %

## 2025-04-09 DIAGNOSIS — N30.00 ACUTE CYSTITIS WITHOUT HEMATURIA: ICD-10-CM

## 2025-04-09 DIAGNOSIS — E86.0 DEHYDRATION: Primary | ICD-10-CM

## 2025-04-09 LAB
ALBUMIN SERPL-MCNC: 4.3 G/DL (ref 3.5–5.2)
ALBUMIN/GLOB SERPL: 1.3 G/DL
ALP SERPL-CCNC: 119 U/L (ref 39–117)
ALT SERPL W P-5'-P-CCNC: 82 U/L (ref 1–33)
AMPHET+METHAMPHET UR QL: NEGATIVE
AMPHETAMINES UR QL: NEGATIVE
ANION GAP SERPL CALCULATED.3IONS-SCNC: 22 MMOL/L (ref 5–15)
AST SERPL-CCNC: 132 U/L (ref 1–32)
BACTERIA UR QL AUTO: ABNORMAL /HPF
BARBITURATES UR QL SCN: NEGATIVE
BASOPHILS # BLD AUTO: 0.04 10*3/MM3 (ref 0–0.2)
BASOPHILS NFR BLD AUTO: 1.1 % (ref 0–1.5)
BENZODIAZ UR QL SCN: POSITIVE
BILIRUB SERPL-MCNC: 1.5 MG/DL (ref 0–1.2)
BILIRUB UR QL STRIP: ABNORMAL
BUN SERPL-MCNC: 14 MG/DL (ref 6–20)
BUN/CREAT SERPL: 14.7 (ref 7–25)
BUPRENORPHINE SERPL-MCNC: POSITIVE NG/ML
CALCIUM SPEC-SCNC: 9.2 MG/DL (ref 8.6–10.5)
CANNABINOIDS SERPL QL: NEGATIVE
CHLORIDE SERPL-SCNC: 91 MMOL/L (ref 98–107)
CLARITY UR: CLEAR
CO2 SERPL-SCNC: 26 MMOL/L (ref 22–29)
COCAINE UR QL: NEGATIVE
COLOR UR: ABNORMAL
CREAT SERPL-MCNC: 0.95 MG/DL (ref 0.57–1)
D-LACTATE SERPL-SCNC: 0.8 MMOL/L (ref 0.5–2)
D-LACTATE SERPL-SCNC: 2.7 MMOL/L (ref 0.5–2)
DEPRECATED RDW RBC AUTO: 56.9 FL (ref 37–54)
EGFRCR SERPLBLD CKD-EPI 2021: 71.3 ML/MIN/1.73
EOSINOPHIL # BLD AUTO: 0.02 10*3/MM3 (ref 0–0.4)
EOSINOPHIL NFR BLD AUTO: 0.6 % (ref 0.3–6.2)
ERYTHROCYTE [DISTWIDTH] IN BLOOD BY AUTOMATED COUNT: 14.4 % (ref 12.3–15.4)
FENTANYL UR-MCNC: NEGATIVE NG/ML
FLUAV RNA RESP QL NAA+PROBE: NOT DETECTED
FLUBV RNA ISLT QL NAA+PROBE: NOT DETECTED
GLOBULIN UR ELPH-MCNC: 3.2 GM/DL
GLUCOSE SERPL-MCNC: 119 MG/DL (ref 65–99)
GLUCOSE UR STRIP-MCNC: NEGATIVE MG/DL
HCT VFR BLD AUTO: 43 % (ref 34–46.6)
HGB BLD-MCNC: 14.2 G/DL (ref 12–15.9)
HGB UR QL STRIP.AUTO: NEGATIVE
HYALINE CASTS UR QL AUTO: ABNORMAL /LPF
IMM GRANULOCYTES # BLD AUTO: 0.02 10*3/MM3 (ref 0–0.05)
IMM GRANULOCYTES NFR BLD AUTO: 0.6 % (ref 0–0.5)
KETONES UR QL STRIP: ABNORMAL
LEUKOCYTE ESTERASE UR QL STRIP.AUTO: ABNORMAL
LYMPHOCYTES # BLD AUTO: 0.74 10*3/MM3 (ref 0.7–3.1)
LYMPHOCYTES NFR BLD AUTO: 20.4 % (ref 19.6–45.3)
MCH RBC QN AUTO: 35.2 PG (ref 26.6–33)
MCHC RBC AUTO-ENTMCNC: 33 G/DL (ref 31.5–35.7)
MCV RBC AUTO: 106.7 FL (ref 79–97)
METHADONE UR QL SCN: NEGATIVE
MONOCYTES # BLD AUTO: 0.55 10*3/MM3 (ref 0.1–0.9)
MONOCYTES NFR BLD AUTO: 15.2 % (ref 5–12)
NEUTROPHILS NFR BLD AUTO: 2.26 10*3/MM3 (ref 1.7–7)
NEUTROPHILS NFR BLD AUTO: 62.1 % (ref 42.7–76)
NITRITE UR QL STRIP: POSITIVE
NRBC BLD AUTO-RTO: 0 /100 WBC (ref 0–0.2)
OPIATES UR QL: NEGATIVE
OXYCODONE UR QL SCN: NEGATIVE
PCP UR QL SCN: NEGATIVE
PH UR STRIP.AUTO: 6 [PH] (ref 5–8)
PLATELET # BLD AUTO: 242 10*3/MM3 (ref 140–450)
PMV BLD AUTO: 10.7 FL (ref 6–12)
POTASSIUM SERPL-SCNC: 3.3 MMOL/L (ref 3.5–5.2)
PROT SERPL-MCNC: 7.5 G/DL (ref 6–8.5)
PROT UR QL STRIP: ABNORMAL
RBC # BLD AUTO: 4.03 10*6/MM3 (ref 3.77–5.28)
RBC # UR STRIP: ABNORMAL /HPF
REF LAB TEST METHOD: ABNORMAL
SARS-COV-2 RNA RESP QL NAA+PROBE: NOT DETECTED
SODIUM SERPL-SCNC: 139 MMOL/L (ref 136–145)
SP GR UR STRIP: 1.03 (ref 1–1.03)
SQUAMOUS #/AREA URNS HPF: ABNORMAL /HPF
TRICYCLICS UR QL SCN: NEGATIVE
UROBILINOGEN UR QL STRIP: ABNORMAL
WBC # UR STRIP: ABNORMAL /HPF
WBC NRBC COR # BLD AUTO: 3.63 10*3/MM3 (ref 3.4–10.8)

## 2025-04-09 PROCEDURE — 96375 TX/PRO/DX INJ NEW DRUG ADDON: CPT

## 2025-04-09 PROCEDURE — 85025 COMPLETE CBC W/AUTO DIFF WBC: CPT | Performed by: PHYSICIAN ASSISTANT

## 2025-04-09 PROCEDURE — 87636 SARSCOV2 & INF A&B AMP PRB: CPT | Performed by: PHYSICIAN ASSISTANT

## 2025-04-09 PROCEDURE — 25010000002 CEFTRIAXONE PER 250 MG: Performed by: PHYSICIAN ASSISTANT

## 2025-04-09 PROCEDURE — 81001 URINALYSIS AUTO W/SCOPE: CPT | Performed by: PHYSICIAN ASSISTANT

## 2025-04-09 PROCEDURE — 25010000002 ONDANSETRON PER 1 MG: Performed by: PHYSICIAN ASSISTANT

## 2025-04-09 PROCEDURE — 87086 URINE CULTURE/COLONY COUNT: CPT | Performed by: PHYSICIAN ASSISTANT

## 2025-04-09 PROCEDURE — 25010000002 PROCHLORPERAZINE 10 MG/2ML SOLUTION: Performed by: PHYSICIAN ASSISTANT

## 2025-04-09 PROCEDURE — 87040 BLOOD CULTURE FOR BACTERIA: CPT | Performed by: PHYSICIAN ASSISTANT

## 2025-04-09 PROCEDURE — 80307 DRUG TEST PRSMV CHEM ANLYZR: CPT | Performed by: PHYSICIAN ASSISTANT

## 2025-04-09 PROCEDURE — P9612 CATHETERIZE FOR URINE SPEC: HCPCS

## 2025-04-09 PROCEDURE — 96365 THER/PROPH/DIAG IV INF INIT: CPT

## 2025-04-09 PROCEDURE — 25810000003 SODIUM CHLORIDE 0.9 % SOLUTION: Performed by: PHYSICIAN ASSISTANT

## 2025-04-09 PROCEDURE — 74177 CT ABD & PELVIS W/CONTRAST: CPT

## 2025-04-09 PROCEDURE — 80053 COMPREHEN METABOLIC PANEL: CPT | Performed by: PHYSICIAN ASSISTANT

## 2025-04-09 PROCEDURE — 99285 EMERGENCY DEPT VISIT HI MDM: CPT

## 2025-04-09 PROCEDURE — 87077 CULTURE AEROBIC IDENTIFY: CPT | Performed by: PHYSICIAN ASSISTANT

## 2025-04-09 PROCEDURE — 25510000001 IOPAMIDOL 61 % SOLUTION: Performed by: STUDENT IN AN ORGANIZED HEALTH CARE EDUCATION/TRAINING PROGRAM

## 2025-04-09 PROCEDURE — 36415 COLL VENOUS BLD VENIPUNCTURE: CPT

## 2025-04-09 PROCEDURE — 74177 CT ABD & PELVIS W/CONTRAST: CPT | Performed by: RADIOLOGY

## 2025-04-09 PROCEDURE — 87186 SC STD MICRODIL/AGAR DIL: CPT | Performed by: PHYSICIAN ASSISTANT

## 2025-04-09 PROCEDURE — 83605 ASSAY OF LACTIC ACID: CPT | Performed by: PHYSICIAN ASSISTANT

## 2025-04-09 RX ORDER — PROCHLORPERAZINE EDISYLATE 5 MG/ML
10 INJECTION INTRAMUSCULAR; INTRAVENOUS ONCE
Status: COMPLETED | OUTPATIENT
Start: 2025-04-09 | End: 2025-04-09

## 2025-04-09 RX ORDER — CEFDINIR 300 MG/1
300 CAPSULE ORAL 2 TIMES DAILY
Qty: 14 CAPSULE | Refills: 0 | Status: SHIPPED | OUTPATIENT
Start: 2025-04-09 | End: 2025-04-16

## 2025-04-09 RX ORDER — ONDANSETRON 2 MG/ML
4 INJECTION INTRAMUSCULAR; INTRAVENOUS ONCE
Status: COMPLETED | OUTPATIENT
Start: 2025-04-09 | End: 2025-04-09

## 2025-04-09 RX ORDER — SODIUM CHLORIDE 0.9 % (FLUSH) 0.9 %
10 SYRINGE (ML) INJECTION AS NEEDED
Status: DISCONTINUED | OUTPATIENT
Start: 2025-04-09 | End: 2025-04-09 | Stop reason: HOSPADM

## 2025-04-09 RX ORDER — POTASSIUM CHLORIDE 1.5 G/1.58G
20 POWDER, FOR SOLUTION ORAL ONCE
Status: DISCONTINUED | OUTPATIENT
Start: 2025-04-09 | End: 2025-04-09 | Stop reason: HOSPADM

## 2025-04-09 RX ORDER — IOPAMIDOL 612 MG/ML
84 INJECTION, SOLUTION INTRAVASCULAR
Status: COMPLETED | OUTPATIENT
Start: 2025-04-09 | End: 2025-04-09

## 2025-04-09 RX ADMIN — ONDANSETRON 4 MG: 2 INJECTION INTRAMUSCULAR; INTRAVENOUS at 10:08

## 2025-04-09 RX ADMIN — IOPAMIDOL 84 ML: 612 INJECTION, SOLUTION INTRAVENOUS at 12:47

## 2025-04-09 RX ADMIN — CEFTRIAXONE 1000 MG: 1 INJECTION, POWDER, FOR SOLUTION INTRAMUSCULAR; INTRAVENOUS at 11:46

## 2025-04-09 RX ADMIN — SODIUM CHLORIDE 1000 ML: 9 INJECTION, SOLUTION INTRAVENOUS at 10:08

## 2025-04-09 RX ADMIN — PROCHLORPERAZINE EDISYLATE 10 MG: 5 INJECTION INTRAMUSCULAR; INTRAVENOUS at 13:34

## 2025-04-09 RX ADMIN — SODIUM CHLORIDE 1000 ML: 9 INJECTION, SOLUTION INTRAVENOUS at 12:22

## 2025-04-09 NOTE — DISCHARGE INSTRUCTIONS
Call one of the offices below to establish a primary care provider.  If you are unable to get an appointment and feel it is an emergency and need to be seen immediately please return to the Emergency Department    Call one of the offices below to set up a primary care provider.       Dr. Krueger  [address]  [facility] [id number] [phone]    [facility])  [address]  [facility] [id number] [phone]    [facility] ([place])                                                                                                       [address] [facility] [id number] [phone]    [facility])  [address]   [address]  [place] [address]  [phone]    [facility] ([place])  [address]  [place] [address]  Phone: [phone]    [facility]  [address] [place] [id number] [phone]    [facility]  [address]  [place] [address]  [phone]      [facility])  [address] [place] [address]  Phone: [phone]    Dr. Elke Conteh  [address]  [facility] [id number] [phone]    [place] [address]  [phone]    [facility]  Sabrina Nazario, APRN  [address] [place] [address]  [phone]    Dr. Ray and [facility]   [address]  [address]  [place] [address]  [phone]

## 2025-04-09 NOTE — ED PROVIDER NOTES
Subjective   History of Present Illness  pt reports vomiting for 3 days with diarrhea. Pt reports that her hands have been cramping up and that she cannot move them.  Patient has past medical history of asthma, alcoholic, bradycardia, PVC, RA, osteoarthritis.    History provided by:  Patient   used: No    Vomiting  The primary symptoms include vomiting. The illness began 3 to 5 days ago. The onset was sudden. The problem has been gradually worsening.   The illness is also significant for chills. Significant associated medical issues include liver disease.       Review of Systems   Constitutional:  Positive for chills.   Gastrointestinal:  Positive for vomiting.       Past Medical History:   Diagnosis Date    Alcoholic     Anxiety and depression     Asthma     Bradycardia     Dyspnea     Hx of sepsis 10/2016    Hypothyroid     Osteoarthritis     PVC (premature ventricular contraction)     RA (rheumatoid arthritis)     Ulcer of abdomen wall        Allergies   Allergen Reactions    Antihistamines, Diphenhydramine-Type Hives and Mental Status Change    Benadryl [Diphenhydramine] Hives and Mental Status Change    Codeine Nausea And Vomiting    Hydrocodone Nausea Only       Past Surgical History:   Procedure Laterality Date    CARDIAC CATHETERIZATION N/A 2017    Procedure: Left Heart Cath;  Surgeon: Don Clifford MD;  Location:  [facility];  Service:      SECTION      x 2    CHOLECYSTECTOMY      ENDOMETRIAL ABLATION      FRACTURE SURGERY Left     knee     KNEE CARTILAGE SURGERY Left     LAPAROSCOPIC CHOLECYSTECTOMY      LAPAROSCOPIC TUBAL LIGATION      LUMBAR LAMINECTOMY DISCECTOMY DECOMPRESSION Left 2021    Procedure: LUMBAR LAMINECTOMY L4-5;  Surgeon: John Bullock MD;  Location: [facility] OR;  Service: Neurosurgery;  Laterality: Left;    WRIST SURGERY Left     hardware present       Family History   Problem Relation Age of Onset    Cancer Mother     Arthritis  "Mother     Bradycardia Mother     Other Mother         Tachycardia     Arrhythmia Mother     Cirrhosis Father        Social History     Socioeconomic History    Marital status: Single    Number of children: 4    Highest education level: Associate degree: academic program   Tobacco Use    Smoking status: Every Day     Current packs/day: 0.50     Average packs/day: 0.5 packs/day for 30.0 years (15.0 ttl pk-yrs)     Types: Cigarettes    Smokeless tobacco: Never    Tobacco comments:     Trying to quit, smoking 6 cigarettes per day.    Vaping Use    Vaping status: Every Day    Substances: Nicotine, THC    Devices: Disposable   Substance and Sexual Activity    Alcohol use: Yes     Comment: fifth per day    Drug use: Yes     Types: Methamphetamines, Oxycodone, Marijuana, Amphetamines, Benzodiazepines     Comment: Suboxone 2 strips \"I never use this but I did this morning.\"    Sexual activity: Defer           Objective   Physical Exam  Vitals and nursing note reviewed.   Constitutional:       Appearance: She is well-developed.   HENT:      Head: Normocephalic.   Cardiovascular:      Rate and Rhythm: Normal rate and regular rhythm.   Pulmonary:      Effort: Pulmonary effort is normal.      Breath sounds: Normal breath sounds.   Abdominal:      General: Bowel sounds are normal.      Palpations: Abdomen is soft.      Tenderness: There is no abdominal tenderness.   Musculoskeletal:         General: Normal range of motion.      Cervical back: Neck supple.   Skin:     General: Skin is warm and dry.   Neurological:      Mental Status: She is alert and oriented to person, place, and time.   Psychiatric:         Behavior: Behavior normal.         Thought Content: Thought content normal.         Judgment: Judgment normal.         Procedures           ED Course  ED Course as of 04/09/25 1941 Wed Apr 09, 2025   1510 Awaiting fluids  [LC]      ED Course User Index  [LC] Shanique Jiang PA      CT Abdomen Pelvis With Contrast   Final " Result   1.  Very low-attenuation the liver that may represent fatty liver.   2.  Degenerative changes lumbar spine as described.       This report was finalized on 4/9/2025 12:52 PM by Dr. Rick Lindo MD.                                                Results for orders placed or performed during the hospital encounter of 04/09/25   CBC Auto Differential    Collection Time: 04/09/25 10:05 AM    Specimen: Blood   Result Value Ref Range    WBC 3.63 3.40 - 10.80 10*3/mm3    RBC 4.03 3.77 - 5.28 10*6/mm3    Hemoglobin 14.2 12.0 - 15.9 g/dL    Hematocrit 43.0 34.0 - 46.6 %    .7 (H) 79.0 - 97.0 fL    MCH 35.2 (H) 26.6 - 33.0 pg    MCHC 33.0 31.5 - 35.7 g/dL    RDW 14.4 12.3 - 15.4 %    RDW-SD 56.9 (H) 37.0 - 54.0 fl    MPV 10.7 6.0 - 12.0 fL    Platelets 242 140 - 450 10*3/mm3    Neutrophil % 62.1 42.7 - 76.0 %    Lymphocyte % 20.4 19.6 - 45.3 %    Monocyte % 15.2 (H) 5.0 - 12.0 %    Eosinophil % 0.6 0.3 - 6.2 %    Basophil % 1.1 0.0 - 1.5 %    Immature Grans % 0.6 (H) 0.0 - 0.5 %    Neutrophils, Absolute 2.26 1.70 - 7.00 10*3/mm3    Lymphocytes, Absolute 0.74 0.70 - 3.10 10*3/mm3    Monocytes, Absolute 0.55 0.10 - 0.90 10*3/mm3    Eosinophils, Absolute 0.02 0.00 - 0.40 10*3/mm3    Basophils, Absolute 0.04 0.00 - 0.20 10*3/mm3    Immature Grans, Absolute 0.02 0.00 - 0.05 10*3/mm3    nRBC 0.0 0.0 - 0.2 /100 WBC   Urinalysis With Culture If Indicated - Straight Cath    Collection Time: 04/09/25 10:44 AM    Specimen: Straight Cath; Urine   Result Value Ref Range    Color, UA Dark Yellow (A) Yellow, Straw    Appearance, UA Clear Clear    pH, UA 6.0 5.0 - 8.0    Specific Gravity, UA 1.027 1.005 - 1.030    Glucose, UA Negative Negative    Ketones, UA >=160 mg/dL (4+) (A) Negative    Bilirubin, UA Large (3+) (A) Negative    Blood, UA Negative Negative    Protein, UA 30 mg/dL (1+) (A) Negative    Leuk Esterase, UA Small (1+) (A) Negative    Nitrite, UA Positive (A) Negative    Urobilinogen, UA 1.0 E.U./dL 0.2 - 1.0  E.U./dL   Urinalysis, Microscopic Only - Straight Cath    Collection Time: 04/09/25 10:44 AM    Specimen: Straight Cath; Urine   Result Value Ref Range    RBC, UA 0-2 None Seen, 0-2 /HPF    WBC, UA 11-20 (A) None Seen, 0-2 /HPF    Bacteria, UA None Seen None Seen /HPF    Squamous Epithelial Cells, UA 3-6 (A) None Seen, 0-2 /HPF    Hyaline Casts, UA 7-12 None Seen /LPF    Methodology Automated Microscopy    Urine Drug Screen - Straight Cath    Collection Time: 04/09/25 10:44 AM    Specimen: Straight Cath; Urine   Result Value Ref Range    THC, Screen, Urine Negative Negative    Phencyclidine (PCP), Urine Negative Negative    Cocaine Screen, Urine Negative Negative    Methamphetamine, Ur Negative Negative    Opiate Screen Negative Negative    Amphetamine Screen, Urine Negative Negative    Benzodiazepine Screen, Urine Positive (A) Negative    Tricyclic Antidepressants Screen Negative Negative    Methadone Screen, Urine Negative Negative    Barbiturates Screen, Urine Negative Negative    Oxycodone Screen, Urine Negative Negative    Buprenorphine, Screen, Urine Positive (A) Negative   Fentanyl, Urine - Straight Cath    Collection Time: 04/09/25 10:44 AM    Specimen: Straight Cath; Urine   Result Value Ref Range    Fentanyl, Urine Negative Negative   Comprehensive Metabolic Panel    Collection Time: 04/09/25 10:56 AM    Specimen: Blood   Result Value Ref Range    Glucose 119 (H) 65 - 99 mg/dL    BUN 14 6 - 20 mg/dL    Creatinine 0.95 0.57 - 1.00 mg/dL    Sodium 139 136 - 145 mmol/L    Potassium 3.3 (L) 3.5 - 5.2 mmol/L    Chloride 91 (L) 98 - 107 mmol/L    CO2 26.0 22.0 - 29.0 mmol/L    Calcium 9.2 8.6 - 10.5 mg/dL    Total Protein 7.5 6.0 - 8.5 g/dL    Albumin 4.3 3.5 - 5.2 g/dL    ALT (SGPT) 82 (H) 1 - 33 U/L    AST (SGOT) 132 (H) 1 - 32 U/L    Alkaline Phosphatase 119 (H) 39 - 117 U/L    Total Bilirubin 1.5 (H) 0.0 - 1.2 mg/dL    Globulin 3.2 gm/dL    A/G Ratio 1.3 g/dL    BUN/Creatinine Ratio 14.7 7.0 - 25.0    Anion  Gap 22.0 (H) 5.0 - 15.0 mmol/L    eGFR 71.3 >60.0 mL/min/1.73   Lactic Acid, Plasma    Collection Time: 04/09/25 11:37 AM    Specimen: Arm, Left; Blood   Result Value Ref Range    Lactate 2.7 (C) 0.5 - 2.0 mmol/L   COVID-19 and FLU A/B PCR, 1 HR TAT - Swab, Nasopharynx    Collection Time: 04/09/25 12:25 PM    Specimen: Nasopharynx; Swab   Result Value Ref Range    COVID19 Not Detected Not Detected - Ref. Range    Influenza A PCR Not Detected Not Detected    Influenza B PCR Not Detected Not Detected   STAT Lactic Acid, Reflex    Collection Time: 04/09/25  2:35 PM    Specimen: Arm, Left; Blood   Result Value Ref Range    Lactate 0.8 0.5 - 2.0 mmol/L                Medical Decision Making  pt reports vomiting for 3 days with diarrhea. Pt reports that her hands have been cramping up and that she cannot move them.  Patient has past medical history of asthma, alcoholic, bradycardia, PVC, RA, osteoarthritis.      Problems Addressed:  Acute cystitis without hematuria: complicated acute illness or injury  Dehydration: complicated acute illness or injury    Amount and/or Complexity of Data Reviewed  Labs: ordered.  Radiology: ordered.    Risk  Prescription drug management.        Final diagnoses:   Dehydration   Acute cystitis without hematuria       ED Disposition  ED Disposition       ED Disposition   Discharge    Condition   Stable    Comment   --               Provider, No Known  [facility] [id number] [phone]    In 2 days           Medication List        New Prescriptions      cefdinir 300 MG capsule  Commonly known as: OMNICEF  Take 1 capsule by mouth 2 (Two) Times a Day for 7 days.               Where to Get Your Medications        These medications were sent to [facility] - [place], [place] - [address] [place] [address] - [phone]  - [phone] [address] [address]      Phone: [phone]   cefdinir 300 MG capsule            Shanique Jiang PA  04/09/25 9701

## 2025-04-11 LAB — BACTERIA SPEC AEROBE CULT: ABNORMAL

## 2025-04-14 LAB
BACTERIA SPEC AEROBE CULT: NORMAL
BACTERIA SPEC AEROBE CULT: NORMAL

## (undated) DEVICE — ACCY PA700 LUBRICANT DIFFUSER MR7 4 PACK: Brand: MIDAS REX

## (undated) DEVICE — CATH F5 INF PIG145 110CM 6SH: Brand: INFINITI

## (undated) DEVICE — ST INF PRI SMRTSTE 20DRP 2VLV 24ML 117

## (undated) DEVICE — PAD HEMOST NEPTUNE 2X2IN

## (undated) DEVICE — Device

## (undated) DEVICE — ADHS LIQ MASTISOL 2/3ML

## (undated) DEVICE — SUT VIC PLS CTD ANTIB BR 3/0 8/18IN 45CM

## (undated) DEVICE — SHEATH INTRO SUPERSHEATH JWIRE .035 5F 11CM

## (undated) DEVICE — GW MOVE CORE .035 145CM

## (undated) DEVICE — PK NEURO DISC 10

## (undated) DEVICE — JACKSON-PRATT 100CC BULB RESERVOIR: Brand: CARDINAL HEALTH

## (undated) DEVICE — ADULT DISPOSABLE SINGLE-PATIENT USE PULSE OXIMETER SENSOR: Brand: NONIN

## (undated) DEVICE — STRAP POSTN KN/BDY FM 5X72IN DISP

## (undated) DEVICE — ELECTRD BLD EZ CLN MOD 4IN

## (undated) DEVICE — TOOL 14MH30 LEGEND 14CM 3MM: Brand: MIDAS REX ™

## (undated) DEVICE — 3M™ STERI-STRIP™ REINFORCED ADHESIVE SKIN CLOSURES, R1547, 1/2 IN X 4 IN (12 MM X 100 MM), 6 STRIPS/ENVELOPE: Brand: 3M™ STERI-STRIP™

## (undated) DEVICE — ELECTRD BLD EZ CLN STD 2.5IN

## (undated) DEVICE — PK CATH CARD 70

## (undated) DEVICE — JP PERF DRN SIL FLT 7MM FULL: Brand: CARDINAL HEALTH

## (undated) DEVICE — C-ARM DRAPE: Brand: DEROYAL

## (undated) DEVICE — PATIENT RETURN ELECTRODE, SINGLE-USE, CONTACT QUALITY MONITORING, ADULT, WITH 9FT CORD, FOR PATIENTS WEIGING OVER 33LBS. (15KG): Brand: MEGADYNE

## (undated) DEVICE — BLANKT WARM UPPR/BDY ARM/OUT 57X196CM

## (undated) DEVICE — ANTIBACTERIAL UNDYED BRAIDED (POLYGLACTIN 910), SYNTHETIC ABSORBABLE SUTURE: Brand: COATED VICRYL

## (undated) DEVICE — CANNULA,OXY,ADULT,SUPER SOFT,W/14'TUB,UC: Brand: MEDLINE INDUSTRIES, INC.

## (undated) DEVICE — UNDERGLV SURG BIOGEL INDICAT PI SZ8 BLU

## (undated) DEVICE — GLV SURG SIGNATURE TOUCH PF LTX 7.5 STRL

## (undated) DEVICE — CATH F5 INF JL 4 100CM: Brand: INFINITI

## (undated) DEVICE — PENCL ROCKRSWCH MEGADYNE W/HOLSTR 10FT SS

## (undated) DEVICE — HDRST INTUB GENTLETOUCH SLOT 7IN RT

## (undated) DEVICE — DRSNG WND BORDR/ADHS NONADHR/GZ LF 4X4IN STRL

## (undated) DEVICE — DRSNG SURESITE WNDW 4X4.5

## (undated) DEVICE — CATH F5 INF JR 4 100CM: Brand: INFINITI

## (undated) DEVICE — ST EXT IV SMARTSITE 2VLV SP M LL 5ML IV1

## (undated) DEVICE — CVR HNDL LIGHT RIGID

## (undated) DEVICE — GLV SURG PREMIERPRO MIC LTX PF SZ6.5 BRN